# Patient Record
Sex: MALE | Race: WHITE | NOT HISPANIC OR LATINO | Employment: FULL TIME | URBAN - NONMETROPOLITAN AREA
[De-identification: names, ages, dates, MRNs, and addresses within clinical notes are randomized per-mention and may not be internally consistent; named-entity substitution may affect disease eponyms.]

---

## 2017-03-25 ENCOUNTER — APPOINTMENT (OUTPATIENT)
Dept: GENERAL RADIOLOGY | Facility: HOSPITAL | Age: 42
End: 2017-03-25

## 2017-03-25 ENCOUNTER — HOSPITAL ENCOUNTER (INPATIENT)
Facility: HOSPITAL | Age: 42
LOS: 2 days | Discharge: HOME | End: 2017-03-27
Attending: FAMILY MEDICINE | Admitting: HOSPITALIST

## 2017-03-25 DIAGNOSIS — F19.10 SUBSTANCE ABUSE (HCC): ICD-10-CM

## 2017-03-25 DIAGNOSIS — M62.82 NON-TRAUMATIC RHABDOMYOLYSIS: Primary | ICD-10-CM

## 2017-03-25 PROBLEM — F15.10 AMPHETAMINE ABUSE (HCC): Status: ACTIVE | Noted: 2017-03-25

## 2017-03-25 LAB
ALBUMIN SERPL-MCNC: 4.7 G/DL (ref 3.4–4.8)
ALBUMIN/GLOB SERPL: 1.5 G/DL (ref 1.1–1.8)
ALP SERPL-CCNC: 80 U/L (ref 38–126)
ALT SERPL W P-5'-P-CCNC: 45 U/L (ref 21–72)
AMPHET+METHAMPHET UR QL: POSITIVE
ANION GAP SERPL CALCULATED.3IONS-SCNC: 19 MMOL/L (ref 5–15)
AST SERPL-CCNC: 93 U/L (ref 17–59)
BARBITURATES UR QL SCN: NEGATIVE
BASOPHILS # BLD AUTO: 0.02 10*3/MM3 (ref 0–0.2)
BASOPHILS NFR BLD AUTO: 0.2 % (ref 0–2)
BENZODIAZ UR QL SCN: NEGATIVE
BILIRUB SERPL-MCNC: 2.1 MG/DL (ref 0.2–1.3)
BUN BLD-MCNC: 25 MG/DL (ref 7–21)
BUN/CREAT SERPL: 20.7 (ref 7–25)
CALCIUM SPEC-SCNC: 9.6 MG/DL (ref 8.4–10.2)
CANNABINOIDS SERPL QL: NEGATIVE
CHLORIDE SERPL-SCNC: 103 MMOL/L (ref 95–110)
CK MB SERPL-CCNC: 12.8 NG/ML (ref 0–5)
CK SERPL-CCNC: 1413 U/L (ref 55–170)
CO2 SERPL-SCNC: 21 MMOL/L (ref 22–31)
COCAINE UR QL: NEGATIVE
CREAT BLD-MCNC: 1.21 MG/DL (ref 0.7–1.3)
DEPRECATED RDW RBC AUTO: 41.5 FL (ref 35.1–43.9)
EOSINOPHIL # BLD AUTO: 0.01 10*3/MM3 (ref 0–0.7)
EOSINOPHIL NFR BLD AUTO: 0.1 % (ref 0–7)
ERYTHROCYTE [DISTWIDTH] IN BLOOD BY AUTOMATED COUNT: 12.5 % (ref 11.5–14.5)
ETHANOL BLD-MCNC: <10 MG/DL (ref 0–10)
ETHANOL UR QL: <0.01 %
GFR SERPL CREATININE-BSD FRML MDRD: 67 ML/MIN/1.73 (ref 60–162)
GLOBULIN UR ELPH-MCNC: 3.2 GM/DL (ref 2.3–3.5)
GLUCOSE BLD-MCNC: 107 MG/DL (ref 60–100)
HCT VFR BLD AUTO: 46.5 % (ref 39–49)
HGB BLD-MCNC: 17 G/DL (ref 13.7–17.3)
HOLD SPECIMEN: NORMAL
IMM GRANULOCYTES # BLD: 0.03 10*3/MM3 (ref 0–0.02)
IMM GRANULOCYTES NFR BLD: 0.3 % (ref 0–0.5)
LYMPHOCYTES # BLD AUTO: 1.02 10*3/MM3 (ref 0.6–4.2)
LYMPHOCYTES NFR BLD AUTO: 9.2 % (ref 10–50)
MAGNESIUM SERPL-MCNC: 2.2 MG/DL (ref 1.6–2.3)
MCH RBC QN AUTO: 33 PG (ref 26.5–34)
MCHC RBC AUTO-ENTMCNC: 36.6 G/DL (ref 31.5–36.3)
MCV RBC AUTO: 90.3 FL (ref 80–98)
METHADONE UR QL SCN: NEGATIVE
MONOCYTES # BLD AUTO: 0.2 10*3/MM3 (ref 0–0.9)
MONOCYTES NFR BLD AUTO: 1.8 % (ref 0–12)
NEUTROPHILS # BLD AUTO: 9.77 10*3/MM3 (ref 2–8.6)
NEUTROPHILS NFR BLD AUTO: 88.4 % (ref 37–80)
NRBC BLD MANUAL-RTO: 0 /100 WBC (ref 0–0)
OPIATES UR QL: NEGATIVE
OXYCODONE UR QL SCN: NEGATIVE
PLATELET # BLD AUTO: 231 10*3/MM3 (ref 150–450)
PMV BLD AUTO: 10.9 FL (ref 8–12)
POTASSIUM BLD-SCNC: 3.5 MMOL/L (ref 3.5–5.1)
PROT SERPL-MCNC: 7.9 G/DL (ref 6.3–8.6)
RBC # BLD AUTO: 5.15 10*6/MM3 (ref 4.37–5.74)
SODIUM BLD-SCNC: 143 MMOL/L (ref 137–145)
TROPONIN I SERPL-MCNC: <0.012 NG/ML
WBC NRBC COR # BLD: 11.05 10*3/MM3 (ref 3.2–9.8)
WHOLE BLOOD HOLD SPECIMEN: NORMAL
WHOLE BLOOD HOLD SPECIMEN: NORMAL

## 2017-03-25 PROCEDURE — 82553 CREATINE MB FRACTION: CPT | Performed by: FAMILY MEDICINE

## 2017-03-25 PROCEDURE — 83735 ASSAY OF MAGNESIUM: CPT | Performed by: FAMILY MEDICINE

## 2017-03-25 PROCEDURE — 80307 DRUG TEST PRSMV CHEM ANLYZR: CPT | Performed by: FAMILY MEDICINE

## 2017-03-25 PROCEDURE — 99284 EMERGENCY DEPT VISIT MOD MDM: CPT

## 2017-03-25 PROCEDURE — 93005 ELECTROCARDIOGRAM TRACING: CPT | Performed by: FAMILY MEDICINE

## 2017-03-25 PROCEDURE — 85025 COMPLETE CBC W/AUTO DIFF WBC: CPT | Performed by: FAMILY MEDICINE

## 2017-03-25 PROCEDURE — 93010 ELECTROCARDIOGRAM REPORT: CPT | Performed by: INTERNAL MEDICINE

## 2017-03-25 PROCEDURE — 25010000002 LORAZEPAM PER 2 MG: Performed by: INTERNAL MEDICINE

## 2017-03-25 PROCEDURE — 84484 ASSAY OF TROPONIN QUANT: CPT | Performed by: FAMILY MEDICINE

## 2017-03-25 PROCEDURE — 25010000002 LORAZEPAM PER 2 MG: Performed by: FAMILY MEDICINE

## 2017-03-25 PROCEDURE — 71010 HC CHEST PA OR AP: CPT

## 2017-03-25 PROCEDURE — 82550 ASSAY OF CK (CPK): CPT | Performed by: FAMILY MEDICINE

## 2017-03-25 PROCEDURE — 80053 COMPREHEN METABOLIC PANEL: CPT | Performed by: FAMILY MEDICINE

## 2017-03-25 RX ORDER — LORAZEPAM 2 MG/ML
1 INJECTION INTRAMUSCULAR ONCE
Status: COMPLETED | OUTPATIENT
Start: 2017-03-25 | End: 2017-03-25

## 2017-03-25 RX ORDER — DIVALPROEX SODIUM 500 MG/1
500 TABLET, DELAYED RELEASE ORAL 2 TIMES DAILY
Refills: 2 | COMMUNITY
Start: 2017-01-31 | End: 2018-04-19

## 2017-03-25 RX ORDER — LISINOPRIL 20 MG/1
20 TABLET ORAL DAILY
Status: DISCONTINUED | OUTPATIENT
Start: 2017-03-25 | End: 2017-03-27 | Stop reason: HOSPADM

## 2017-03-25 RX ORDER — SODIUM CHLORIDE 9 MG/ML
250 INJECTION, SOLUTION INTRAVENOUS CONTINUOUS
Status: DISCONTINUED | OUTPATIENT
Start: 2017-03-25 | End: 2017-03-25

## 2017-03-25 RX ORDER — DIVALPROEX SODIUM 250 MG/1
500 TABLET, DELAYED RELEASE ORAL 2 TIMES DAILY
Status: DISCONTINUED | OUTPATIENT
Start: 2017-03-25 | End: 2017-03-27 | Stop reason: HOSPADM

## 2017-03-25 RX ORDER — TRAMADOL HYDROCHLORIDE 50 MG/1
50 TABLET ORAL 2 TIMES DAILY
COMMUNITY
End: 2018-02-20 | Stop reason: SDUPTHER

## 2017-03-25 RX ORDER — SODIUM CHLORIDE 0.9 % (FLUSH) 0.9 %
1-10 SYRINGE (ML) INJECTION AS NEEDED
Status: DISCONTINUED | OUTPATIENT
Start: 2017-03-25 | End: 2017-03-27 | Stop reason: HOSPADM

## 2017-03-25 RX ORDER — CYANOCOBALAMIN 1000 UG/ML
1000 INJECTION, SOLUTION INTRAMUSCULAR; SUBCUTANEOUS WEEKLY
Refills: 0 | COMMUNITY
Start: 2017-01-31 | End: 2018-09-04

## 2017-03-25 RX ORDER — SODIUM CHLORIDE 9 MG/ML
125 INJECTION, SOLUTION INTRAVENOUS CONTINUOUS
Status: DISCONTINUED | OUTPATIENT
Start: 2017-03-25 | End: 2017-03-27 | Stop reason: HOSPADM

## 2017-03-25 RX ORDER — LORAZEPAM 2 MG/ML
2 INJECTION INTRAMUSCULAR EVERY 4 HOURS PRN
Status: DISCONTINUED | OUTPATIENT
Start: 2017-03-25 | End: 2017-03-25

## 2017-03-25 RX ORDER — LORAZEPAM 2 MG/ML
1 INJECTION INTRAMUSCULAR EVERY 4 HOURS PRN
Status: DISCONTINUED | OUTPATIENT
Start: 2017-03-25 | End: 2017-03-25

## 2017-03-25 RX ORDER — LORAZEPAM 2 MG/ML
2 INJECTION INTRAMUSCULAR
Status: DISCONTINUED | OUTPATIENT
Start: 2017-03-25 | End: 2017-03-27 | Stop reason: HOSPADM

## 2017-03-25 RX ORDER — OLANZAPINE 10 MG/1
10 TABLET ORAL NIGHTLY
Status: DISCONTINUED | OUTPATIENT
Start: 2017-03-25 | End: 2017-03-27 | Stop reason: HOSPADM

## 2017-03-25 RX ORDER — GABAPENTIN 300 MG/1
300 CAPSULE ORAL 3 TIMES DAILY
Status: DISCONTINUED | OUTPATIENT
Start: 2017-03-25 | End: 2017-03-27 | Stop reason: HOSPADM

## 2017-03-25 RX ADMIN — LORAZEPAM 1 MG: 2 INJECTION INTRAMUSCULAR at 07:22

## 2017-03-25 RX ADMIN — LORAZEPAM 2 MG: 2 INJECTION, SOLUTION INTRAMUSCULAR; INTRAVENOUS at 15:35

## 2017-03-25 RX ADMIN — GABAPENTIN 300 MG: 300 CAPSULE ORAL at 16:19

## 2017-03-25 RX ADMIN — DIVALPROEX SODIUM 500 MG: 250 TABLET, DELAYED RELEASE ORAL at 17:39

## 2017-03-25 RX ADMIN — LORAZEPAM 2 MG: 2 INJECTION INTRAMUSCULAR; INTRAVENOUS at 20:29

## 2017-03-25 RX ADMIN — LORAZEPAM 2 MG: 2 INJECTION INTRAMUSCULAR; INTRAVENOUS at 22:37

## 2017-03-25 RX ADMIN — LISINOPRIL 20 MG: 20 TABLET ORAL at 16:20

## 2017-03-25 RX ADMIN — LORAZEPAM 2 MG: 2 INJECTION INTRAMUSCULAR; INTRAVENOUS at 17:38

## 2017-03-25 RX ADMIN — SODIUM CHLORIDE 250 ML/HR: 900 INJECTION, SOLUTION INTRAVENOUS at 09:05

## 2017-03-25 RX ADMIN — SODIUM CHLORIDE 1000 ML: 900 INJECTION, SOLUTION INTRAVENOUS at 07:22

## 2017-03-25 RX ADMIN — SODIUM CHLORIDE 125 ML/HR: 900 INJECTION, SOLUTION INTRAVENOUS at 14:29

## 2017-03-25 NOTE — H&P
History and Physical  Ian Reyes MD  Hospitalist      Patient Care Team:  GEORGETTE Dominguez as PCP - General (Family Medicine)    Chief complaint   Chief Complaint   Patient presents with   • Drug Overdose       Subjective     Patient is a 41 y.o. male admitted through the ER after being found by police walking the streets in his underwear and without shoes.     History  Past Medical History:   Diagnosis Date   • Hypertension      History reviewed. No pertinent surgical history.  History reviewed. No pertinent family history.  Social History   Substance Use Topics   • Smoking status: Current Every Day Smoker     Packs/day: 1.00     Types: Cigarettes   • Smokeless tobacco: None   • Alcohol use 3.0 oz/week     5 Cans of beer per week     Prescriptions Prior to Admission   Medication Sig Dispense Refill Last Dose   • cyanocobalamin 1000 MCG/ML injection Inject 1,000 mcg into the shoulder, thigh, or buttocks 1 (One) Time Per Week.  0 Past Week at Unknown time   • divalproex (DEPAKOTE) 500 MG DR tablet Take 500 mg by mouth 2 (Two) Times a Day.  2 3/24/2017 at Unknown time   • gabapentin (NEURONTIN) 300 MG capsule Take 300 mg by mouth 3 (Three) Times a Day.   3/24/2017 at Unknown time   • lisinopril (PRINIVIL,ZESTRIL) 20 MG tablet Take 20 mg by mouth Daily.   3/24/2017 at Unknown time   • traMADol (ULTRAM) 50 MG tablet Take 50 mg by mouth 2 (Two) Times a Day.   3/24/2017 at Unknown time   • promethazine-dextromethorphan (PROMETHAZINE-DM) 6.25-15 MG/5ML syrup Take 5 mL by mouth Every 6 (Six) Hours As Needed for cough. 120 mL 1      Allergies:  Penicillins    Review of Systems  Review of Systems   Constitutional: Positive for fatigue.   Respiratory: Positive for cough.    Cardiovascular: Negative for chest pain and leg swelling.   Gastrointestinal: Negative for constipation, diarrhea and nausea.   Musculoskeletal: Positive for back pain.   Neurological: Positive for tremors and numbness.    Psychiatric/Behavioral: Positive for agitation, behavioral problems, hallucinations and sleep disturbance.   All other systems reviewed and are negative.      Objective     Vital Signs  Temp:  [97.1 °F (36.2 °C)-98.9 °F (37.2 °C)] 97.1 °F (36.2 °C)  Heart Rate:  [108-158] 112  Resp:  [20-24] 22  BP: (130-158)/(63-87) 148/72    Physical Exam:  Physical Exam   Constitutional: He is oriented to person, place, and time. He appears well-developed and well-nourished.   HENT:   Head: Normocephalic and atraumatic.   Eyes: EOM are normal. Pupils are equal, round, and reactive to light.   Cardiovascular: Regular rhythm.  Tachycardia present.    No murmur heard.  Pulmonary/Chest: No respiratory distress. He has no wheezes. He has no rales.   Abdominal: Soft. Bowel sounds are normal.   Neurological: He is alert and oriented to person, place, and time.   Skin: Skin is warm and dry.   Psychiatric: His mood appears anxious. His speech is rapid and/or pressured. He is agitated and hyperactive. He exhibits abnormal recent memory.   Vitals reviewed.      Results Review:   Lab Results (last 24 hours)     Procedure Component Value Units Date/Time    Ethanol [[id number]] Collected:  03/25/17 0636    Specimen:  Blood Updated:  03/25/17 0710     Ethanol <10 mg/dL      Ethanol % <0.010 %     Urine Drug Screen [[id number]]  (Abnormal) Collected:  03/25/17 0644    Specimen:  Urine from Urine, Clean Catch Updated:  03/25/17 0734     Amphetamine Screen, Urine Positive (A)     Barbiturates Screen, Urine Negative     Benzodiazepine Screen, Urine Negative     Cocaine Screen, Urine Negative     Methadone Screen, Urine Negative     Opiate Screen Negative     Oxycodone Screen, Urine Negative     THC, Screen, Urine Negative    Narrative:       Negative Thresholds For Drugs Screened in Urine:     Amphetamines          500 ng/ml  Barbiturates          200 ng/ml  Benzodiazepines       200 ng/ml  Cocaine               150 ng/ml  Methadone             300  ng/mL  Opiates               300 ng/mL  Oxycodone             100 ng/mL  THC                   20 ng/mL    The normal value for all drugs tested is negative. This report includes final unconfirmed screening results.  A positive result by this assay can be, at your request, sent to the Reference Lab for confirmation by gas chromatography. Unconfirmed results must not be used for non-medical purposes, such as employment or legal testing. Clinical consideration should be applied to any drug of abuse test result, particularly when unconfirmed results are used.    Comprehensive Metabolic Panel [[id number]]  (Abnormal) Collected:  03/25/17 0636    Specimen:  Blood Updated:  03/25/17 0743     Glucose 107 (H) mg/dL      BUN 25 (H) mg/dL      Creatinine 1.21 mg/dL      Sodium 143 mmol/L      Potassium 3.5 mmol/L      Chloride 103 mmol/L      CO2 21.0 (L) mmol/L      Calcium 9.6 mg/dL      Total Protein 7.9 g/dL      Albumin 4.70 g/dL      ALT (SGPT) 45 U/L      AST (SGOT) 93 (H) U/L      Alkaline Phosphatase 80 U/L      Total Bilirubin 2.1 (H) mg/dL      eGFR Non African Amer 67 mL/min/1.73      Globulin 3.2 gm/dL      A/G Ratio 1.5 g/dL      BUN/Creatinine Ratio 20.7     Anion Gap 19.0 (H) mmol/L     Magnesium [[id number]]  (Normal) Collected:  03/25/17 0636    Specimen:  Blood Updated:  03/25/17 0743     Magnesium 2.2 mg/dL     CK [[id number]]  (Abnormal) Collected:  03/25/17 0636    Specimen:  Blood Updated:  03/25/17 0743     Creatine Kinase 1413 (H) U/L     CBC Auto Differential [[id number]]  (Abnormal) Collected:  03/25/17 0636    Specimen:  Blood Updated:  03/25/17 0749     WBC 11.05 (H) 10*3/mm3      RBC 5.15 10*6/mm3      Hemoglobin 17.0 g/dL      Hematocrit 46.5 %      MCV 90.3 fL      MCH 33.0 pg      MCHC 36.6 (H) g/dL      RDW 12.5 %      RDW-SD 41.5 fl      MPV 10.9 fL      Platelets 231 10*3/mm3      Neutrophil % 88.4 (H) %      Lymphocyte % 9.2 (L) %      Monocyte % 1.8 %      Eosinophil % 0.1 %      Basophil % 0.2 %       Immature Grans % 0.3 %      Neutrophils, Absolute 9.77 (H) 10*3/mm3      Lymphocytes, Absolute 1.02 10*3/mm3      Monocytes, Absolute 0.20 10*3/mm3      Eosinophils, Absolute 0.01 10*3/mm3      Basophils, Absolute 0.02 10*3/mm3      Immature Grans, Absolute 0.03 (H) 10*3/mm3      nRBC 0.0 /100 WBC     CK-MB [[id number]]  (Abnormal) Collected:  03/25/17 0636    Specimen:  Blood Updated:  03/25/17 0755     CKMB 12.80 (H) ng/mL     Troponin [[id number]]  (Normal) Collected:  03/25/17 0636    Specimen:  Blood Updated:  03/25/17 0755     Troponin I <0.012 ng/mL     CBC & Differential [[id number]] Collected:  03/25/17 0636    Specimen:  Blood Updated:  03/25/17 0815    Narrative:       The following orders were created for panel order CBC & Differential.  Procedure                               Abnormality         Status                     ---------                               -----------         ------                     Scan Slide[[id number]]                                                                   CBC Auto Differential[[id number]]         Abnormal            Final result                 Please view results for these tests on the individual orders.    Lavender Top [[id number]] Collected:  03/25/17 0636    Specimen:  Blood Updated:  03/25/17 1102     Extra Tube hold for add-on      Auto resulted       Light Blue Top [[id number]] Collected:  03/25/17 0636    Specimen:  Blood Updated:  03/25/17 1102     Extra Tube hold for add-on      Auto resulted       Green Top (Gel) [[id number]] Collected:  03/25/17 0636    Specimen:  Blood Updated:  03/25/17 1102     Extra Tube Hold for add-ons.      Auto resulted.       [place] Draw [[id number]] Collected:  03/25/17 0636    Specimen:  Blood Updated:  03/25/17 1412    Narrative:       The following orders were created for panel order [place] Draw.  Procedure                               Abnormality         Status                     ---------                               -----------          ------                     Light Blue Top[[id number]]                                    Final result               Green Top (Gel)[[id number]]                                   Final result               Lavender Top[[id number]]                                      Final result               Gold Top - SST[[id number]]                                                                 Please view results for these tests on the individual orders.          Imaging Results (last 24 hours)     Procedure Component Value Units Date/Time    XR Chest 1 View [[id number]] Collected:  03/25/17 0805     Updated:  03/25/17 0807    Narrative:       [facility] [place]    Patient Name: MR. JESSICA LAUREN    ORDERING: JESSICA JOHNSON     ATTENDING: JESSICA JOHNSON     REFERRING: JESSICA JOHNSON    -----------------------    PROCEDURE: Portable chest x-ray    TECHNIQUE: Single AP view of the chest    COMPARISON: None    HISTORY: SOA    FINDINGS:     Life-support devices: None    Lungs/pleura: Clear    Heart, hilar and mediastinal structures:  Normal accounting for  projection and technique      Impression:       CONCLUSION:  No acute pulmonary disease.    Electronically signed by:  Kayden Cotto MD  3/25/2017 8:06 AM CDT  Workstation: [id number]          Assessment/Plan     Principal Problem:    Amphetamine abuse / overdose  Active Problems:    Non-traumatic rhabdomyolysis    Continue with the IV fluid, IV Ativan and Zyprexa PRN for the agitation.    Ian Reyes MD  03/25/17  2:13 PM

## 2017-03-25 NOTE — ED PROVIDER NOTES
Subjective   HPI Comments: Recent h/o synthetic marijuana and methamphetamine use. Pt found walking down the street in his underwear by police.      Review of Systems   Constitutional: Positive for activity change, appetite change, chills, diaphoresis and fatigue. Negative for fever.   HENT: Negative for congestion, ear discharge, ear pain, nosebleeds, rhinorrhea, sinus pressure, sore throat and trouble swallowing.    Eyes: Negative for discharge and redness.   Respiratory: Positive for chest tightness. Negative for apnea, cough, shortness of breath and wheezing.    Cardiovascular: Positive for palpitations. Negative for chest pain.   Gastrointestinal: Negative for abdominal pain, diarrhea, nausea and vomiting.   Endocrine: Negative for polyuria.   Genitourinary: Negative for dysuria, frequency and urgency.   Musculoskeletal: Negative for myalgias and neck pain.   Skin: Negative for color change and rash.   Allergic/Immunologic: Negative for immunocompromised state.   Neurological: Negative for dizziness, seizures, syncope, weakness, light-headedness and headaches.   Hematological: Negative for adenopathy. Does not bruise/bleed easily.   Psychiatric/Behavioral: Positive for behavioral problems and confusion. The patient is nervous/anxious and is hyperactive.    All other systems reviewed and are negative.      Past Medical History:   Diagnosis Date   • Hypertension        Allergies   Allergen Reactions   • Penicillins        History reviewed. No pertinent surgical history.    History reviewed. No pertinent family history.    Social History     Social History   • Marital status:      Spouse name: N/A   • Number of children: N/A   • Years of education: N/A     Social History Main Topics   • Smoking status: Current Every Day Smoker     Packs/day: 0.50     Types: Cigarettes   • Smokeless tobacco: None   • Alcohol use None   • Drug use: Yes     Special: Methamphetamines   • Sexual activity: Not Asked     Other  Topics Concern   • None     Social History Narrative   • None           Objective   Physical Exam   Constitutional: He is oriented to person, place, and time. He appears well-developed and well-nourished.   HENT:   Head: Normocephalic and atraumatic.   Nose: Nose normal.   Mouth/Throat: Oropharynx is clear and moist.   Eyes: Conjunctivae and EOM are normal. Pupils are equal, round, and reactive to light. Right eye exhibits no discharge. Left eye exhibits no discharge. No scleral icterus.   Neck: Normal range of motion. Neck supple. No tracheal deviation present.   Cardiovascular: Regular rhythm and normal heart sounds.  Tachycardia present.    No murmur heard.  Pulmonary/Chest: Effort normal and breath sounds normal. No stridor. No respiratory distress. He has no wheezes. He has no rales.   Abdominal: Soft. Bowel sounds are normal. He exhibits no distension and no mass. There is no tenderness. There is no rebound and no guarding.   Musculoskeletal: He exhibits no edema.   Abrasions right and left foot/toes   Neurological: He is alert and oriented to person, place, and time. Coordination normal.   Skin: Skin is warm and dry. No rash noted. No erythema.   Psychiatric: His mood appears anxious. He is agitated, aggressive and hyperactive. Cognition and memory are impaired.   Nursing note and vitals reviewed.      ECG 12 Lead    Date/Time: 3/25/2017 7:31 AM  Performed by: JESSICA JOHNSON  Authorized by: JESSICA JOHNSON   Interpreted by physician  Rhythm: sinus tachycardia  Rate: tachycardic  BPM: 146  QRS axis: normal  ST Segments: ST segments normal                   ED Course  ED Course          Labs Reviewed   URINE DRUG SCREEN - Abnormal; Notable for the following:        Result Value    Amphetamine Screen, Urine Positive (*)     All other components within normal limits    Narrative:     Negative Thresholds For Drugs Screened in Urine:     Amphetamines          500 ng/ml  Barbiturates          200  ng/ml  Benzodiazepines       200 ng/ml  Cocaine               150 ng/ml  Methadone             300 ng/mL  Opiates               300 ng/mL  Oxycodone             100 ng/mL  THC                   20 ng/mL    The normal value for all drugs tested is negative. This report includes final unconfirmed screening results.  A positive result by this assay can be, at your request, sent to the Reference Lab for confirmation by gas chromatography. Unconfirmed results must not be used for non-medical purposes, such as employment or legal testing. Clinical consideration should be applied to any drug of abuse test result, particularly when unconfirmed results are used.   COMPREHENSIVE METABOLIC PANEL - Abnormal; Notable for the following:     Glucose 107 (*)     BUN 25 (*)     CO2 21.0 (*)     AST (SGOT) 93 (*)     Total Bilirubin 2.1 (*)     Anion Gap 19.0 (*)     All other components within normal limits   CK - Abnormal; Notable for the following:     Creatine Kinase 1413 (*)     All other components within normal limits   CK MB - Abnormal; Notable for the following:     CKMB 12.80 (*)     All other components within normal limits   CBC WITH AUTO DIFFERENTIAL - Abnormal; Notable for the following:     WBC 11.05 (*)     MCHC 36.6 (*)     Neutrophil % 88.4 (*)     Lymphocyte % 9.2 (*)     Neutrophils, Absolute 9.77 (*)     Immature Grans, Absolute 0.03 (*)     All other components within normal limits   TROPONIN (IN-HOUSE) - Normal   MAGNESIUM - Normal   ETHANOL   RAINBOW DRAW    Narrative:     The following orders were created for panel order [place] Draw.  Procedure                               Abnormality         Status                     ---------                               -----------         ------                     Light Blue Top[[id number]]                                    In process                 Green Top (Gel)[[id number]]                                   In process                 Lavender Top[[id number]]                                       In process                 Gold Top - [facility][[id number]]                                                                 Please view results for these tests on the individual orders.   CBC AND DIFFERENTIAL    Narrative:     The following orders were created for panel order CBC & Differential.  Procedure                               Abnormality         Status                     ---------                               -----------         ------                     Scan Slide[[id number]]                                                                   CBC Auto Differential[[id number]]         Abnormal            Final result                 Please view results for these tests on the individual orders.   LIGHT BLUE TOP   GREEN TOP   LAVENDER TOP   GOLD TOP - SST       XR Chest 1 View   Final Result   CONCLUSION:   No acute pulmonary disease.      Electronically signed by:  Kayden Cotto MD  3/25/2017 8:06 AM CDT   Workstation: [id number]                    [facility]    Final diagnoses:   Non-traumatic rhabdomyolysis   Substance abuse            Marko Scott MD  03/25/17 0908       Marko Scott MD  03/25/17 0915

## 2017-03-25 NOTE — ED NOTES
Attempted to call report Brenda said that a nurse would have to call back.      Myla Rooney RN  03/25/17 0998

## 2017-03-25 NOTE — PLAN OF CARE
Problem: Patient Care Overview (Adult)  Goal: Plan of Care Review  Outcome: Ongoing (interventions implemented as appropriate)  Goal: Adult Individualization and Mutuality  Outcome: Ongoing (interventions implemented as appropriate)  Goal: Discharge Needs Assessment  Outcome: Ongoing (interventions implemented as appropriate)    Problem: Infection, Risk/Actual (Adult)  Goal: Identify Related Risk Factors and Signs and Symptoms  Outcome: Ongoing (interventions implemented as appropriate)  Goal: Infection Prevention/Resolution  Outcome: Ongoing (interventions implemented as appropriate)    Problem: Fall Risk (Adult)  Goal: Identify Related Risk Factors and Signs and Symptoms  Outcome: Ongoing (interventions implemented as appropriate)  Goal: Absence of Falls  Outcome: Ongoing (interventions implemented as appropriate)    Problem: Fluid Volume Deficit (Adult)  Goal: Identify Related Risk Factors and Signs and Symptoms  Outcome: Ongoing (interventions implemented as appropriate)  Goal: Fluid/Electrolyte Balance  Outcome: Ongoing (interventions implemented as appropriate)  Goal: Comfort/Well Being  Outcome: Ongoing (interventions implemented as appropriate)

## 2017-03-26 LAB
ALBUMIN SERPL-MCNC: 3.6 G/DL (ref 3.4–4.8)
ALBUMIN/GLOB SERPL: 1.4 G/DL (ref 1.1–1.8)
ALP SERPL-CCNC: 68 U/L (ref 38–126)
ALT SERPL W P-5'-P-CCNC: 46 U/L (ref 21–72)
ANION GAP SERPL CALCULATED.3IONS-SCNC: 10 MMOL/L (ref 5–15)
AST SERPL-CCNC: 67 U/L (ref 17–59)
BILIRUB SERPL-MCNC: 1.4 MG/DL (ref 0.2–1.3)
BUN BLD-MCNC: 11 MG/DL (ref 7–21)
BUN/CREAT SERPL: 16.4 (ref 7–25)
CALCIUM SPEC-SCNC: 8.2 MG/DL (ref 8.4–10.2)
CHLORIDE SERPL-SCNC: 106 MMOL/L (ref 95–110)
CK SERPL-CCNC: 784 U/L (ref 55–170)
CO2 SERPL-SCNC: 24 MMOL/L (ref 22–31)
CREAT BLD-MCNC: 0.67 MG/DL (ref 0.7–1.3)
DEPRECATED RDW RBC AUTO: 44.8 FL (ref 35.1–43.9)
ERYTHROCYTE [DISTWIDTH] IN BLOOD BY AUTOMATED COUNT: 13 % (ref 11.5–14.5)
GFR SERPL CREATININE-BSD FRML MDRD: 132 ML/MIN/1.73 (ref 70–162)
GLOBULIN UR ELPH-MCNC: 2.6 GM/DL (ref 2.3–3.5)
GLUCOSE BLD-MCNC: 107 MG/DL (ref 60–100)
HCT VFR BLD AUTO: 46.9 % (ref 39–49)
HGB BLD-MCNC: 16.2 G/DL (ref 13.7–17.3)
MCH RBC QN AUTO: 32.8 PG (ref 26.5–34)
MCHC RBC AUTO-ENTMCNC: 34.5 G/DL (ref 31.5–36.3)
MCV RBC AUTO: 94.9 FL (ref 80–98)
PLATELET # BLD AUTO: 134 10*3/MM3 (ref 150–450)
PMV BLD AUTO: 9.4 FL (ref 8–12)
POTASSIUM BLD-SCNC: 3.5 MMOL/L (ref 3.5–5.1)
PROT SERPL-MCNC: 6.2 G/DL (ref 6.3–8.6)
RBC # BLD AUTO: 4.94 10*6/MM3 (ref 4.37–5.74)
SODIUM BLD-SCNC: 140 MMOL/L (ref 137–145)
WBC NRBC COR # BLD: 6.09 10*3/MM3 (ref 3.2–9.8)

## 2017-03-26 PROCEDURE — 80053 COMPREHEN METABOLIC PANEL: CPT | Performed by: NURSE PRACTITIONER

## 2017-03-26 PROCEDURE — 25010000002 LORAZEPAM PER 2 MG: Performed by: INTERNAL MEDICINE

## 2017-03-26 PROCEDURE — 82550 ASSAY OF CK (CPK): CPT | Performed by: NURSE PRACTITIONER

## 2017-03-26 PROCEDURE — 85027 COMPLETE CBC AUTOMATED: CPT | Performed by: NURSE PRACTITIONER

## 2017-03-26 RX ADMIN — OLANZAPINE 10 MG: 10 TABLET, FILM COATED ORAL at 21:20

## 2017-03-26 RX ADMIN — Medication 10 ML: at 00:44

## 2017-03-26 RX ADMIN — LISINOPRIL 20 MG: 20 TABLET ORAL at 08:41

## 2017-03-26 RX ADMIN — LORAZEPAM 2 MG: 2 INJECTION INTRAMUSCULAR; INTRAVENOUS at 02:58

## 2017-03-26 RX ADMIN — GABAPENTIN 300 MG: 300 CAPSULE ORAL at 08:41

## 2017-03-26 RX ADMIN — LORAZEPAM 2 MG: 2 INJECTION INTRAMUSCULAR; INTRAVENOUS at 06:42

## 2017-03-26 RX ADMIN — SODIUM CHLORIDE 125 ML/HR: 900 INJECTION, SOLUTION INTRAVENOUS at 17:04

## 2017-03-26 RX ADMIN — GABAPENTIN 300 MG: 300 CAPSULE ORAL at 21:20

## 2017-03-26 RX ADMIN — LORAZEPAM 2 MG: 2 INJECTION INTRAMUSCULAR; INTRAVENOUS at 21:20

## 2017-03-26 RX ADMIN — LORAZEPAM 2 MG: 2 INJECTION INTRAMUSCULAR; INTRAVENOUS at 09:09

## 2017-03-26 RX ADMIN — LORAZEPAM 2 MG: 2 INJECTION INTRAMUSCULAR; INTRAVENOUS at 00:43

## 2017-03-26 RX ADMIN — DIVALPROEX SODIUM 500 MG: 250 TABLET, DELAYED RELEASE ORAL at 17:04

## 2017-03-26 RX ADMIN — DIVALPROEX SODIUM 500 MG: 250 TABLET, DELAYED RELEASE ORAL at 08:41

## 2017-03-26 RX ADMIN — GABAPENTIN 300 MG: 300 CAPSULE ORAL at 15:41

## 2017-03-26 RX ADMIN — SODIUM CHLORIDE 125 ML/HR: 900 INJECTION, SOLUTION INTRAVENOUS at 10:43

## 2017-03-26 RX ADMIN — Medication 10 ML: at 21:20

## 2017-03-26 RX ADMIN — SODIUM CHLORIDE 125 ML/HR: 900 INJECTION, SOLUTION INTRAVENOUS at 00:43

## 2017-03-26 NOTE — PROGRESS NOTES
[facility] Medicine Services  INPATIENT PROGRESS NOTE    Length of Stay: 1  Date of Admission: 3/25/2017  Primary Care Physician: GEORGETTE Dominguez    Subjective   Chief Complaint: Amphetamine abuse  HPI:  41 year old male who was found wandering on the road by police wearing only his underwear.  Urine tox was amphetamine positive.  He states he feels better than yesterday.  Nursing reports agitation/tremor/hyperactivity has improved from yesterday.    Review of Systems   Constitutional: Negative for chills and fever.   Respiratory: Negative for cough and shortness of breath.    Cardiovascular: Negative for chest pain and palpitations.   Gastrointestinal: Negative for abdominal pain, diarrhea, nausea and vomiting.   Musculoskeletal: Negative for back pain and myalgias.   Skin: Negative for rash and wound.   Neurological: Positive for tremors. Negative for dizziness, weakness and headaches.      All pertinent negatives and positives are as above. All other systems have been reviewed and are negative unless otherwise stated.     Objective    Temp:  [97 °F (36.1 °C)-98.3 °F (36.8 °C)] 97 °F (36.1 °C)  Heart Rate:  [101-115] 102  Resp:  [18-21] 20  BP: ()/(36-92) 129/73    Physical Exam   Constitutional: He is oriented to person, place, and time. He appears well-developed and well-nourished. No distress.   HENT:   Head: Normocephalic and atraumatic.   Mouth/Throat: Oropharynx is clear and moist.   Neck: Neck supple.   Cardiovascular: Normal rate, regular rhythm and intact distal pulses.  Exam reveals no gallop and no friction rub.    No murmur heard.  Pulmonary/Chest: Effort normal and breath sounds normal. No respiratory distress. He has no wheezes. He has no rales. He exhibits no tenderness.   Abdominal: Soft. Bowel sounds are normal. He exhibits no distension. There is no tenderness.   Musculoskeletal: Normal range of motion. He exhibits no edema or  deformity.   Neurological: He is alert and oriented to person, place, and time. He displays tremor.   Skin: Skin is warm and dry. He is not diaphoretic. No erythema.   Psychiatric: His mood appears anxious. He is agitated and hyperactive.   Vitals reviewed.    Results Review:  I have reviewed the labs, radiology results, and diagnostic studies.    Laboratory Data:     Results from last 7 days  Lab Units 03/25/17  0636   SODIUM mmol/L 143   POTASSIUM mmol/L 3.5   CHLORIDE mmol/L 103   TOTAL CO2 mmol/L 21.0*   BUN mg/dL 25*   CREATININE mg/dL 1.21   GLUCOSE mg/dL 107*   CALCIUM mg/dL 9.6   BILIRUBIN mg/dL 2.1*   ALK PHOS U/L 80   ALT (SGPT) U/L 45   AST (SGOT) U/L 93*   ANION GAP mmol/L 19.0*     Estimated Creatinine Clearance: 87.1 mL/min (by C-G formula based on Cr of 1.21).    Results from last 7 days  Lab Units 03/25/17  0636   MAGNESIUM mg/dL 2.2           Results from last 7 days  Lab Units 03/26/17  0939 03/25/17  0636   WBC 10*3/mm3 6.09 11.05*   HEMOGLOBIN g/dL 16.2 17.0   HEMATOCRIT % 46.9 46.5   PLATELETS 10*3/mm3 134* 231           Culture Data:   No results found for: BLOODCX  No results found for: URINECX  No results found for: RESPCX  No results found for: WOUNDCX  No results found for: STOOLCX  No components found for: BODYFLD    Radiology Data:   Imaging Results (last 24 hours)     ** No results found for the last 24 hours. **          I have reviewed the patient current medications.     Assessment/Plan     Hospital Problem List     * (Principal)Amphetamine abuse    Non-traumatic rhabdomyolysis          Plan:   IV fluid  Ativan  Monitor electrolytes, renal function, CK  HR is better, only mildly tachycardic      Francis Hernandez, GEORGETTE   03/26/17   11:41 AM

## 2017-03-26 NOTE — PLAN OF CARE
Problem: Patient Care Overview (Adult)  Goal: Plan of Care Review  Outcome: Ongoing (interventions implemented as appropriate)    03/26/17 1514   Coping/Psychosocial Response Interventions   Plan Of Care Reviewed With patient   Patient Care Overview   Progress no change   Outcome Evaluation   Outcome Summary/Follow up Plan Pt still shaky and agitated at timew, but esposides are father apart; Pt still disoriented at times       Goal: Adult Individualization and Mutuality  Outcome: Ongoing (interventions implemented as appropriate)  Goal: Discharge Needs Assessment  Outcome: Ongoing (interventions implemented as appropriate)    Problem: Infection, Risk/Actual (Adult)  Goal: Identify Related Risk Factors and Signs and Symptoms  Outcome: Ongoing (interventions implemented as appropriate)  Goal: Infection Prevention/Resolution  Outcome: Ongoing (interventions implemented as appropriate)    Problem: Fall Risk (Adult)  Goal: Identify Related Risk Factors and Signs and Symptoms  Outcome: Ongoing (interventions implemented as appropriate)  Goal: Absence of Falls  Outcome: Ongoing (interventions implemented as appropriate)    Problem: Fluid Volume Deficit (Adult)  Goal: Identify Related Risk Factors and Signs and Symptoms  Outcome: Ongoing (interventions implemented as appropriate)  Goal: Fluid/Electrolyte Balance  Outcome: Ongoing (interventions implemented as appropriate)  Goal: Comfort/Well Being  Outcome: Ongoing (interventions implemented as appropriate)

## 2017-03-26 NOTE — PLAN OF CARE
Problem: Patient Care Overview (Adult)  Goal: Plan of Care Review  Outcome: Ongoing (interventions implemented as appropriate)    03/26/17 0458   Coping/Psychosocial Response Interventions   Plan Of Care Reviewed With patient   Patient Care Overview   Progress no change   Outcome Evaluation   Outcome Summary/Follow up Plan Pt required ativan q 2 hours during shift to control spasms. Pt experienced one or two moments of orientation.         Problem: Infection, Risk/Actual (Adult)  Goal: Identify Related Risk Factors and Signs and Symptoms  Outcome: Ongoing (interventions implemented as appropriate)  Goal: Infection Prevention/Resolution  Outcome: Ongoing (interventions implemented as appropriate)    Problem: Fall Risk (Adult)  Goal: Identify Related Risk Factors and Signs and Symptoms  Outcome: Ongoing (interventions implemented as appropriate)  Goal: Absence of Falls  Outcome: Ongoing (interventions implemented as appropriate)    Problem: Fluid Volume Deficit (Adult)  Goal: Identify Related Risk Factors and Signs and Symptoms  Outcome: Ongoing (interventions implemented as appropriate)  Goal: Fluid/Electrolyte Balance  Outcome: Ongoing (interventions implemented as appropriate)  Goal: Comfort/Well Being  Outcome: Ongoing (interventions implemented as appropriate)

## 2017-03-27 VITALS
OXYGEN SATURATION: 95 % | TEMPERATURE: 98.5 F | HEART RATE: 106 BPM | DIASTOLIC BLOOD PRESSURE: 86 MMHG | SYSTOLIC BLOOD PRESSURE: 139 MMHG | HEIGHT: 67 IN | BODY MASS INDEX: 30.61 KG/M2 | WEIGHT: 195 LBS | RESPIRATION RATE: 20 BRPM

## 2017-03-27 LAB
ANION GAP SERPL CALCULATED.3IONS-SCNC: 9 MMOL/L (ref 5–15)
BUN BLD-MCNC: 6 MG/DL (ref 7–21)
BUN/CREAT SERPL: 8.7 (ref 7–25)
CALCIUM SPEC-SCNC: 8.2 MG/DL (ref 8.4–10.2)
CHLORIDE SERPL-SCNC: 106 MMOL/L (ref 95–110)
CK SERPL-CCNC: 406 U/L (ref 55–170)
CO2 SERPL-SCNC: 26 MMOL/L (ref 22–31)
CREAT BLD-MCNC: 0.69 MG/DL (ref 0.7–1.3)
GFR SERPL CREATININE-BSD FRML MDRD: 128 ML/MIN/1.73 (ref 70–162)
GLUCOSE BLD-MCNC: 72 MG/DL (ref 60–100)
HOLD SPECIMEN: NORMAL
POTASSIUM BLD-SCNC: 3.2 MMOL/L (ref 3.5–5.1)
SODIUM BLD-SCNC: 141 MMOL/L (ref 137–145)

## 2017-03-27 PROCEDURE — 80048 BASIC METABOLIC PNL TOTAL CA: CPT | Performed by: NURSE PRACTITIONER

## 2017-03-27 PROCEDURE — 82550 ASSAY OF CK (CPK): CPT | Performed by: NURSE PRACTITIONER

## 2017-03-27 RX ADMIN — GABAPENTIN 300 MG: 300 CAPSULE ORAL at 12:13

## 2017-03-27 RX ADMIN — LISINOPRIL 20 MG: 20 TABLET ORAL at 12:13

## 2017-03-27 RX ADMIN — DIVALPROEX SODIUM 500 MG: 250 TABLET, DELAYED RELEASE ORAL at 12:13

## 2017-03-27 RX ADMIN — SODIUM CHLORIDE 125 ML/HR: 900 INJECTION, SOLUTION INTRAVENOUS at 04:47

## 2017-03-27 NOTE — PLAN OF CARE
Problem: Patient Care Overview (Adult)  Goal: Plan of Care Review  Outcome: Ongoing (interventions implemented as appropriate)    03/27/17 0454   Coping/Psychosocial Response Interventions   Plan Of Care Reviewed With patient   Patient Care Overview   Progress improving   Outcome Evaluation   Outcome Summary/Follow up Plan Pt slept well, required ativan less frequently, and was lert, but still disoriented when awake.         Problem: Infection, Risk/Actual (Adult)  Goal: Infection Prevention/Resolution  Outcome: Ongoing (interventions implemented as appropriate)    Problem: Fall Risk (Adult)  Goal: Absence of Falls  Outcome: Ongoing (interventions implemented as appropriate)    Problem: Fluid Volume Deficit (Adult)  Goal: Fluid/Electrolyte Balance  Outcome: Ongoing (interventions implemented as appropriate)  Goal: Comfort/Well Being  Outcome: Ongoing (interventions implemented as appropriate)

## 2017-03-27 NOTE — DISCHARGE SUMMARY
"    [facility] Medicine Services  DISCHARGE SUMMARY       Date of Admission: 3/25/2017  Date of Discharge:  3/27/2017  Primary Care Physician: GEORGETTE Dominguez    Presenting Problem/History of Present Illness:  Substance abuse [F19.10]  Non-traumatic rhabdomyolysis [M62.82]       Final Discharge Diagnoses:  Hospital Problem List     * (Principal)Amphetamine abuse    Non-traumatic rhabdomyolysis          Consults:   Consults     No orders found from 2/24/2017 to 3/26/2017.          Pertinent Test Results: CPK was 1413 on admission, trended down to 406 at discharge    Chief Complaint on Day of Discharge: Anxiety    Hospital Course:  The patient is a 41 y.o. male who [facility] with [facility].  He was found to have mild rhabdomyolysis as well.  He was treated supportively and improved.  His CPK trended down.  On day of discharge, he was awake, and alert and oriented x3.  He was discharged home in good condition.        Condition on Discharge:  good    Physical Exam on Discharge:  /89 (BP Location: Right arm, Patient Position: Lying)  Pulse 96  Temp 97 °F (36.1 °C) (Oral)   Resp 20  Ht 67\" (170.2 cm)  Wt 195 lb (88.5 kg)  SpO2 95%  BMI 30.54 kg/m2     Physical Exam   Constitutional: He appears well-developed and well-nourished.   HENT:   Head: Normocephalic and atraumatic.   Eyes: EOM are normal. Pupils are equal, round, and reactive to light.   Neck: Normal range of motion. Neck supple.   Cardiovascular: Normal rate, regular rhythm, normal heart sounds and intact distal pulses.  Exam reveals no gallop and no friction rub.    No murmur heard.  Pulmonary/Chest: Effort normal and breath sounds normal. No respiratory distress. He has no wheezes. He has no rales. He exhibits no tenderness.   Abdominal: Soft. Bowel sounds are normal. He exhibits no distension. There is no tenderness.   Psychiatric: He has a normal mood and " affect. His behavior is normal.   Vitals reviewed.        Discharge Disposition:  Home or Self Care    Discharge Medications:   Marko Walters   Home Medication Instructions BETINA:[id number]    Printed on:03/27/17 4783   Medication Information                      cyanocobalamin 1000 MCG/ML injection  Inject 1,000 mcg into the shoulder, thigh, or buttocks 1 (One) Time Per Week.             divalproex (DEPAKOTE) 500 MG DR tablet  Take 500 mg by mouth 2 (Two) Times a Day.             gabapentin (NEURONTIN) 300 MG capsule  Take 300 mg by mouth 3 (Three) Times a Day.             lisinopril (PRINIVIL,ZESTRIL) 20 MG tablet  Take 20 mg by mouth Daily.             promethazine-dextromethorphan (PROMETHAZINE-DM) 6.25-15 MG/5ML syrup  Take 5 mL by mouth Every 6 (Six) Hours As Needed for cough.             traMADol (ULTRAM) 50 MG tablet  Take 50 mg by mouth 2 (Two) Times a Day.                 Discharge Diet:   Diet Instructions     Diet: Regular; Thin Liquids, No Restrictions       Discharge Diet:  Regular   Fluid Consistency:  Thin Liquids, No Restrictions                 Activity at Discharge:   Activity Instructions     Activity as Tolerated                     Discharge Care Plan/Instructions: Smoking cessation, illicit drug cessation    Follow-up Appointments:   PCP 1 week    Vikram Curtis MD  03/27/17  2:19 PM    Time: 35 min

## 2018-01-11 ENCOUNTER — TELEPHONE (OUTPATIENT)
Dept: GENERAL RADIOLOGY | Facility: HOSPITAL | Age: 43
End: 2018-01-11

## 2018-01-29 ENCOUNTER — TRANSCRIBE ORDERS (OUTPATIENT)
Dept: ORTHOPEDIC SURGERY | Facility: CLINIC | Age: 43
End: 2018-01-29

## 2018-01-29 DIAGNOSIS — M25.512 LEFT SHOULDER PAIN, UNSPECIFIED CHRONICITY: Primary | ICD-10-CM

## 2018-02-14 ENCOUNTER — OFFICE VISIT (OUTPATIENT)
Dept: ORTHOPEDIC SURGERY | Facility: CLINIC | Age: 43
End: 2018-02-14

## 2018-02-14 DIAGNOSIS — M54.12 CERVICAL RADICULITIS: ICD-10-CM

## 2018-02-14 DIAGNOSIS — M25.512 ACUTE PAIN OF LEFT SHOULDER: Primary | ICD-10-CM

## 2018-02-14 PROCEDURE — 99214 OFFICE O/P EST MOD 30 MIN: CPT | Performed by: NURSE PRACTITIONER

## 2018-02-14 RX ORDER — PYRAZINAMIDE 500 MG/1
1-2 TABLET ORAL EVERY 6 HOURS PRN
Qty: 40 TABLET | Refills: 0 | Status: SHIPPED | OUTPATIENT
Start: 2018-02-14 | End: 2018-04-19

## 2018-02-14 NOTE — PROGRESS NOTES
Marko Walters is a 42 y.o. male   Primary provider:  GEORGETTE Dominguez       Chief Complaint   Patient presents with   • Left Shoulder - Consult       HISTORY OF PRESENT ILLNESS: Patient is here for consult- left shoulder pain. Patient was referred to our office by GEORGETTE Moreno. Patient has been seen by [facility] for this issue as well.  Patient states roughly 1.5 months ago he was playing with his son when he fell landing on his left shoulder. Patient states that 3 days ago he was outside with his dog when he fell twice due to trying to get the dog's leash untangled. Patient states the pain has worsened since then. Previous treatment options include: Ibuprofen, Naproxen, Tylenol and Zanaflex. Patient has also tried applying ice/heat to his shoulder. Pain is described as constant and worse upon movement. Patient states the pain and burning sensation radiates down his arm. Swelling is present to the left shoulder. Patient believes in being honest and open with his medical provider and has admitted to taking a friends Dilaudid in hopes for pain relief. Patient states he realizes this is against the law but was willing to try anything. Patient states that the medication did help the pain.  Patient has brought xray from [facility] for review during today's office visit.     History of Present Illness     CONCURRENT MEDICAL HISTORY:    Past Medical History:   Diagnosis Date   • Hypertension        Allergies   Allergen Reactions   • Penicillins Rash         Current Outpatient Prescriptions:   •  acetaminophen-codeine (TYLENOL/CODEINE #3) 300-30 MG per tablet, Take 1-2 tablets by mouth Every 6 (Six) Hours As Needed for Moderate Pain ., Disp: 40 tablet, Rfl: 0  •  cyanocobalamin 1000 MCG/ML injection, Inject 1,000 mcg into the shoulder, thigh, or buttocks 1 (One) Time Per Week., Disp: , Rfl: 0  •  divalproex (DEPAKOTE) 500 MG DR tablet, Take 500 mg by mouth 2 (Two) Times a Day., Disp: ,  Rfl: 2  •  gabapentin (NEURONTIN) 300 MG capsule, Take 300 mg by mouth 3 (Three) Times a Day., Disp: , Rfl:   •  lisinopril (PRINIVIL,ZESTRIL) 20 MG tablet, Take 20 mg by mouth Daily., Disp: , Rfl:   •  promethazine-dextromethorphan (PROMETHAZINE-DM) 6.25-15 MG/5ML syrup, Take 5 mL by mouth Every 6 (Six) Hours As Needed for cough., Disp: 120 mL, Rfl: 1  •  traMADol (ULTRAM) 50 MG tablet, Take 50 mg by mouth 2 (Two) Times a Day., Disp: , Rfl:     No past surgical history on file.    History reviewed. No pertinent family history.     Social History     Social History   • Marital status:      Spouse name: N/A   • Number of children: N/A   • Years of education: N/A     Occupational History   • Not on file.     Social History Main Topics   • Smoking status: Current Every Day Smoker     Packs/day: 1.00     Types: Cigarettes   • Smokeless tobacco: Not on file   • Alcohol use 3.0 oz/week     5 Cans of beer per week   • Drug use: Yes     Special: Methamphetamines, Cocaine      Comment: synthetic marijuana   • Sexual activity: Defer     Other Topics Concern   • Not on file     Social History Narrative        Review of Systems   Constitutional: Positive for activity change.   HENT: Negative.    Eyes: Negative.    Respiratory: Negative.    Cardiovascular: Negative.    Gastrointestinal: Negative.    Endocrine: Negative.    Genitourinary: Negative.    Musculoskeletal: Negative.    Skin: Negative.    Allergic/Immunologic: Negative.    Neurological: Negative.    Hematological: Negative.    Psychiatric/Behavioral: Negative.        PHYSICAL EXAMINATION:       There were no vitals taken for this visit.    Physical Exam   Constitutional: He is oriented to person, place, and time. Vital signs are normal. He appears well-developed and well-nourished. He is cooperative.   HENT:   Head: Normocephalic and atraumatic.   Neck: Trachea normal and phonation normal.   Pulmonary/Chest: Effort normal. No respiratory distress.   Abdominal:  Soft. Normal appearance. He exhibits no distension.   Neurological: He is alert and oriented to person, place, and time. GCS eye subscore is 4. GCS verbal subscore is 5. GCS motor subscore is 6.   Skin: Skin is warm, dry and intact.   Psychiatric: He has a normal mood and affect. His speech is normal and behavior is normal. Judgment and thought content normal. Cognition and memory are normal.   Vitals reviewed.      GAIT:     [x]  Normal  []  Antalgic    Assistive device: [x]  None  []  Walker     []  Crutches  []  Cane     []  Wheelchair  []  Stretcher    Back Exam     Tenderness   The patient is experiencing tenderness in the cervical.    Range of Motion   Extension: abnormal   Flexion: abnormal   Lateral Bend Right: abnormal   Lateral Bend Left: abnormal   Rotation Right: abnormal   Rotation Left: abnormal     Reflexes   Biceps: abnormal    Other   Toe Walk: normal  Heel Walk: normal  Sensation: decreased (left upper ex)  Gait: normal   Erythema: no back redness  Scars: absent      Right Shoulder Exam   Right shoulder exam is normal.      Left Shoulder Exam     Tenderness   The patient is experiencing tenderness in the acromioclavicular joint.    Range of Motion   Active Abduction: abnormal   Forward Flexion: abnormal   External Rotation: abnormal   Internal Rotation 90 degrees: abnormal     Muscle Strength   Abduction: 4/5   Internal Rotation: 4/5   External Rotation: 4/5   Supraspinatus: 4/5     Tests   Cross Arm: positive  Drop Arm: positive  Impingement: positive    Other   Erythema: absent  Scars: absent  Sensation: normal  Pulse: present                 FIRST CARE    Left shoulder    There is no evidence for fracture or other acute osseous abnormality. There is normal alignment of the glenohumeral joint. The AC joint is normal in appearance, without significant degenerative spurring. The acromion is nearly horizontal.     IMPRESSION    Normal left shoulder      ASSESSMENT:    Diagnoses and all orders for  this visit:    Acute pain of left shoulder  -     MRI shoulder left wo contrast; Future  -     MRI Cervical Spine Without Contrast; Future    Cervical radiculitis  -     MRI Cervical Spine Without Contrast; Future    Other orders  -     acetaminophen-codeine (TYLENOL/CODEINE #3) 300-30 MG per tablet; Take 1-2 tablets by mouth Every 6 (Six) Hours As Needed for Moderate Pain .          PLAN  Significant weakness and limited range of motion noted on exam today of the left shoulder.  Patient also complaining of significant radicular symptoms down the left arm as well after 2 separate falls over the last month.  We'll recommend an MRI of the shoulder rule out a rotator cuff tear and of the cervical spine to rule out a disc rupture.  Patient was given a prescription for Tylenol 3 and told to take as directed.  He continues to take the naproxen and has had a recent course of steroids which has not improved his symptoms.  No Follow-up on file.    Edward Cabezas, GEORGETTE

## 2018-02-19 NOTE — TELEPHONE ENCOUNTER
CALLING TO SAY HIS SHOULDER IS IN SO MUCH PAIN, HE HAS NO MOBILITY. WANTING SOMETHING STRONGER FOR HIS PAIN, [phone]

## 2018-02-20 RX ORDER — TRAMADOL HYDROCHLORIDE 50 MG/1
TABLET ORAL
Qty: 40 TABLET | Refills: 0 | OUTPATIENT
Start: 2018-02-20 | End: 2018-04-19 | Stop reason: ALTCHOICE

## 2018-02-23 ENCOUNTER — HOSPITAL ENCOUNTER (OUTPATIENT)
Dept: MRI IMAGING | Facility: HOSPITAL | Age: 43
Discharge: HOME | End: 2018-02-23

## 2018-02-23 ENCOUNTER — HOSPITAL ENCOUNTER (OUTPATIENT)
Dept: MRI IMAGING | Facility: HOSPITAL | Age: 43
Discharge: HOME | End: 2018-02-23
Admitting: NURSE PRACTITIONER

## 2018-02-23 DIAGNOSIS — M25.512 ACUTE PAIN OF LEFT SHOULDER: ICD-10-CM

## 2018-02-23 DIAGNOSIS — M54.12 CERVICAL RADICULITIS: ICD-10-CM

## 2018-02-23 PROCEDURE — 72141 MRI NECK SPINE W/O DYE: CPT

## 2018-02-23 PROCEDURE — 73221 MRI JOINT UPR EXTREM W/O DYE: CPT

## 2018-02-27 ENCOUNTER — OFFICE VISIT (OUTPATIENT)
Dept: ORTHOPEDIC SURGERY | Facility: CLINIC | Age: 43
End: 2018-02-27

## 2018-02-27 VITALS — WEIGHT: 180 LBS | HEIGHT: 67 IN | BODY MASS INDEX: 28.25 KG/M2

## 2018-02-27 DIAGNOSIS — M75.112 INCOMPLETE TEAR OF LEFT ROTATOR CUFF: Primary | ICD-10-CM

## 2018-02-27 DIAGNOSIS — M19.019 ACROMIOCLAVICULAR JOINT ARTHRITIS: ICD-10-CM

## 2018-02-27 DIAGNOSIS — M50.30 DISC DISEASE, DEGENERATIVE, CERVICAL: ICD-10-CM

## 2018-02-27 PROBLEM — M54.12 CERVICAL RADICULITIS: Status: ACTIVE | Noted: 2018-02-27

## 2018-02-27 PROCEDURE — 99214 OFFICE O/P EST MOD 30 MIN: CPT | Performed by: NURSE PRACTITIONER

## 2018-02-27 PROCEDURE — 20610 DRAIN/INJ JOINT/BURSA W/O US: CPT | Performed by: NURSE PRACTITIONER

## 2018-02-27 RX ADMIN — TRIAMCINOLONE ACETONIDE 80 MG: 40 INJECTION, SUSPENSION INTRA-ARTICULAR; INTRAMUSCULAR at 14:32

## 2018-02-27 RX ADMIN — LIDOCAINE HYDROCHLORIDE 2 ML: 20 INJECTION, SOLUTION INFILTRATION; PERINEURAL at 14:32

## 2018-02-27 NOTE — PROGRESS NOTES
"Marko Walters is a 42 y.o. male returns for     Chief Complaint   Patient presents with   • Cervical Spine - Pain, Follow-up   • Left Shoulder - Follow-up, Pain   • Results     mri cspine and left shoulder        HISTORY OF PRESENT ILLNESS:  42-year-old  male in the office today for follow-up of his neck and shoulder pain.  He reports that after spending mild improvement in his discomfort since the last injection.  He's obtain his MRI of the neck and shoulder as directed     CONCURRENT MEDICAL HISTORY:    Past Medical History:   Diagnosis Date   • Hypertension        Allergies   Allergen Reactions   • Penicillins Rash         Current Outpatient Prescriptions:   •  acetaminophen-codeine (TYLENOL/CODEINE #3) 300-30 MG per tablet, Take 1-2 tablets by mouth Every 6 (Six) Hours As Needed for Moderate Pain ., Disp: 40 tablet, Rfl: 0  •  cyanocobalamin 1000 MCG/ML injection, Inject 1,000 mcg into the shoulder, thigh, or buttocks 1 (One) Time Per Week., Disp: , Rfl: 0  •  divalproex (DEPAKOTE) 500 MG DR tablet, Take 500 mg by mouth 2 (Two) Times a Day., Disp: , Rfl: 2  •  gabapentin (NEURONTIN) 300 MG capsule, Take 300 mg by mouth 3 (Three) Times a Day., Disp: , Rfl:   •  lisinopril (PRINIVIL,ZESTRIL) 20 MG tablet, Take 20 mg by mouth Daily., Disp: , Rfl:   •  promethazine-dextromethorphan (PROMETHAZINE-DM) 6.25-15 MG/5ML syrup, Take 5 mL by mouth Every 6 (Six) Hours As Needed for cough., Disp: 120 mL, Rfl: 1  •  traMADol (ULTRAM) 50 MG tablet, 1-2  PO every 8 hours as needed for moderate pain, Disp: 40 tablet, Rfl: 0    No past surgical history on file.    ROS  No fevers or chills.  No chest pain or shortness of air.  No GI or  disturbances.    PHYSICAL EXAMINATION:       Ht 170.2 cm (67\")  Wt 81.6 kg (180 lb)  BMI 28.19 kg/m2    Physical Exam   Constitutional: He is oriented to person, place, and time. Vital signs are normal. He appears well-developed and well-nourished. He is cooperative.   HENT: "   Head: Normocephalic and atraumatic.   Neck: Trachea normal and phonation normal.   Pulmonary/Chest: Effort normal. No respiratory distress.   Abdominal: Soft. Normal appearance. He exhibits no distension.   Neurological: He is alert and oriented to person, place, and time. GCS eye subscore is 4. GCS verbal subscore is 5. GCS motor subscore is 6.   Skin: Skin is warm, dry and intact.   Psychiatric: He has a normal mood and affect. His speech is normal and behavior is normal. Judgment and thought content normal. Cognition and memory are normal.   Vitals reviewed.      GAIT:     [x]  Normal  []  Antalgic    Assistive device: [x]  None  []  Walker     []  Crutches  []  Cane     []  Wheelchair  []  Stretcher    Back Exam     Tenderness   The patient is experiencing tenderness in the cervical.    Range of Motion   Extension: abnormal   Flexion: abnormal   Lateral Bend Right: abnormal   Lateral Bend Left: abnormal   Rotation Right: abnormal   Rotation Left: abnormal     Reflexes   Biceps: abnormal    Other   Toe Walk: normal  Heel Walk: normal  Sensation: decreased (left upper ex)  Gait: normal   Erythema: no back redness  Scars: absent      Right Shoulder Exam   Right shoulder exam is normal.      Left Shoulder Exam     Tenderness   The patient is experiencing tenderness in the acromioclavicular joint.    Range of Motion   Active Abduction: abnormal   Forward Flexion: abnormal   External Rotation: abnormal   Internal Rotation 90 degrees: abnormal     Muscle Strength   Abduction: 4/5   Internal Rotation: 4/5   External Rotation: 4/5   Supraspinatus: 4/5     Tests   Cross Arm: positive  Drop Arm: positive  Impingement: positive    Other   Erythema: absent  Scars: absent  Sensation: normal  Pulse: present               Mri Cervical Spine Without Contrast    Result Date: 2/23/2018  Narrative: Procedure: MR cervical spine without contrast Reason for exam: Cervical radiculopathy FINDINGS: Multisequence multiplanar MR imaging  of the cervical spine was performed without contrast. Cervical spine vertebral body heights and alignment are normal. There is no evidence of fracture or dislocation identified. The cervical spinal cord is normal. C2-C3: Disc space normal. No disc protrusion or extrusion. Spinal cord and nerve roots exit without compromise. C3-C4: Disc space normal. No disc protrusion or extrusion. Spinal cord and nerve roots exit without compromise. C4-C5: Disc space normal. No disc protrusion or extrusion. Spinal cord and nerve roots exit without compromise. C5-C6: Mild posterior central broad-based disc protrusion mildly indenting the anterior thecal sac. Spinal cord and nerve roots exit without compromise. C6-C7: Mild diffuse disc protrusion mildly indenting the anterior thecal sac. Spinal cord and nerve roots exit without compromise. C7-T1: Disc space normal. No disc protrusion or extrusion. Spinal cord and nerve roots exit without compromise.     Impression: 1.  No acute MR cervical spine abnormality. 2.  C5-C6: Mild posterior central broad-based disc protrusion mildly indenting the anterior thecal sac. Spinal cord and nerve roots exit without compromise. 3.  C6-C7: Mild diffuse disc protrusion mildly indenting the anterior thecal sac. Spinal cord and nerve roots exit without compromise. Electronically signed by:  Jameel Navarro MD  2/23/2018 12:53 PM CST Workstation: [id number]    Mri Shoulder Left Wo Contrast    Result Date: 2/23/2018  Narrative: MRI left shoulder. HISTORY: Left shoulder pain. TECHNIQUE: Multiplanar multisequence noncontrast images left shoulder. Extensive acromioclavicular joint arthrosis. Increased signal intensity, fluid within the AC joint indicating active arthritic, inflammatory process. Acromioclavicular joint arthrosis causing significant underlying impingement upon the bursal surface of the supraspinatous tendon. This is most apparent series 6 image 11. Full-thickness and high-grade partial-thickness tear  involving most of the supraspinatous tendon. The size of the gap in lateral medial projection 2 cm with muscular tendinous retraction of the more anterior aspect of the supraspinatous tendon. More posteriorly there is some signal heterogeneity of the infraspinatus tendon suggesting tendinosis. There is extensive signal abnormality, either contusion or atrophy of the supraspinatous muscle. Series 6 image 16. Small glenohumeral joint effusion. Significant amount of fluid in the subacromial subdeltoid bursa, bursitis. Fluid dissecting into the biceps tendon sheath, tenosynovitis. Thickening and heterogeneity of the subscapularis tendon probably tendinosis. Normal biceps tendon. Normal glenoid kristian.     Impression: CONCLUSION: Extensive acromioclavicular joint arthrosis. Fluid within the AC joint indicating active inflammatory, arthritic process. Acromioclavicular joint arthrosis causing significant underlying impingement upon the bursal surface of the supraspinatous tendon. Full-thickness and high-grade partial-thickness tear involving most of the supraspinatous tendon. There is some muscular tendinous retraction of the more anterior aspect of the supraspinatous tendon and there is signal abnormality within the supraspinatous muscle indicating edema, contusion and/or atrophy. Fluid within the subacromial, subdeltoid bursa, bursitis. Fluid dissecting into the biceps tendon sheath, tenosynovitis. Small glenohumeral joint effusion. Electronically signed by:  Ajay Lowery MD  2/23/2018 7:21 PM CST Workstation: [id number]            ASSESSMENT:    Diagnoses and all orders for this visit:    Incomplete tear of left rotator cuff  -     Large Joint Arthrocentesis  -     Ambulatory Referral to Physical Therapy Evaluate and treat    Disc disease, degenerative, cervical  -     Ambulatory Referral to Physical Therapy Evaluate and treat    Acromioclavicular joint arthritis  -     Ambulatory Referral to Physical Therapy Evaluate and  treat        Large Joint Arthrocentesis  Date/Time: 2/27/2018 2:32 PM  Consent given by: patient  Site marked: site marked  Timeout: Immediately prior to procedure a time out was called to verify the correct patient, procedure, equipment, support staff and site/side marked as required   Supporting Documentation  Indications: pain   Procedure Details  Location: shoulder - L subacromial bursa  Preparation: Patient was prepped and draped in the usual sterile fashion  Needle size: 22 G  Approach: posterior  Medications administered: 80 mg triamcinolone acetonide 40 MG/ML; 2 mL lidocaine 2%  Patient tolerance: patient tolerated the procedure well with no immediate complications          PLAN  Reviewed the MRI results with the patient and discussed options in detail.  We'll plan a subacromial injection in the left shoulder today continued home exercises PT and follow-up with Dr. Guerrero in 4-6 weeks for recheck.  No Follow-up on file.    Edward Cabezas, APRN

## 2018-02-28 RX ORDER — TRIAMCINOLONE ACETONIDE 40 MG/ML
80 INJECTION, SUSPENSION INTRA-ARTICULAR; INTRAMUSCULAR
Status: COMPLETED | OUTPATIENT
Start: 2018-02-27 | End: 2018-02-27

## 2018-02-28 RX ORDER — LIDOCAINE HYDROCHLORIDE 20 MG/ML
2 INJECTION, SOLUTION INFILTRATION; PERINEURAL
Status: COMPLETED | OUTPATIENT
Start: 2018-02-27 | End: 2018-02-27

## 2018-04-19 ENCOUNTER — OFFICE VISIT (OUTPATIENT)
Dept: FAMILY MEDICINE CLINIC | Facility: CLINIC | Age: 43
End: 2018-04-19

## 2018-04-19 VITALS
TEMPERATURE: 96.8 F | DIASTOLIC BLOOD PRESSURE: 82 MMHG | BODY MASS INDEX: 25.05 KG/M2 | HEART RATE: 102 BPM | HEIGHT: 70 IN | WEIGHT: 175 LBS | OXYGEN SATURATION: 97 % | SYSTOLIC BLOOD PRESSURE: 122 MMHG

## 2018-04-19 DIAGNOSIS — R53.83 FATIGUE, UNSPECIFIED TYPE: Primary | ICD-10-CM

## 2018-04-19 DIAGNOSIS — F15.10 AMPHETAMINE ABUSE (HCC): ICD-10-CM

## 2018-04-19 DIAGNOSIS — R20.0 NUMBNESS OF RIGHT HAND: ICD-10-CM

## 2018-04-19 DIAGNOSIS — M54.50 ACUTE BILATERAL LOW BACK PAIN WITHOUT SCIATICA: ICD-10-CM

## 2018-04-19 LAB
BILIRUB BLD-MCNC: NEGATIVE MG/DL
CLARITY, POC: CLEAR
COLOR UR: YELLOW
GLUCOSE UR STRIP-MCNC: NEGATIVE MG/DL
KETONES UR QL: NEGATIVE
LEUKOCYTE EST, POC: NEGATIVE
NITRITE UR-MCNC: NEGATIVE MG/ML
PH UR: 6 [PH] (ref 5–8)
PROT UR STRIP-MCNC: NEGATIVE MG/DL
RBC # UR STRIP: NEGATIVE /UL
SP GR UR: 1.03 (ref 1–1.03)
UROBILINOGEN UR QL: NORMAL

## 2018-04-19 PROCEDURE — 99214 OFFICE O/P EST MOD 30 MIN: CPT | Performed by: NURSE PRACTITIONER

## 2018-04-19 RX ORDER — KETOROLAC TROMETHAMINE 10 MG/1
TABLET, FILM COATED ORAL
Refills: 0 | COMMUNITY
Start: 2018-01-11 | End: 2018-04-19 | Stop reason: ALTCHOICE

## 2018-04-19 RX ORDER — CIPROFLOXACIN 500 MG/1
TABLET, FILM COATED ORAL
Refills: 0 | COMMUNITY
Start: 2018-01-11 | End: 2018-04-19

## 2018-04-19 RX ORDER — BUPRENORPHINE HYDROCHLORIDE AND NALOXONE HYDROCHLORIDE DIHYDRATE 8; 2 MG/1; MG/1
TABLET SUBLINGUAL
Refills: 0 | COMMUNITY
Start: 2018-04-17 | End: 2018-09-04

## 2018-04-19 RX ORDER — AZITHROMYCIN 500 MG/1
TABLET, FILM COATED ORAL
Refills: 0 | COMMUNITY
Start: 2018-04-05 | End: 2018-04-19

## 2018-04-19 RX ORDER — METHYLPREDNISOLONE 4 MG/1
TABLET ORAL
Qty: 1 EACH | Refills: 0 | Status: SHIPPED | OUTPATIENT
Start: 2018-04-19 | End: 2018-09-04

## 2018-04-19 NOTE — PATIENT INSTRUCTIONS
Back Pain, Adult  Back pain is very common in adults. The cause of back pain is rarely dangerous and the pain often gets better over time. The cause of your back pain may not be known. Some common causes of back pain include:  · Strain of the muscles or ligaments supporting the spine.  · Wear and tear (degeneration) of the spinal disks.  · Arthritis.  · Direct injury to the back.  For many people, back pain may return. Since back pain is rarely dangerous, most people can learn to manage this condition on their own.  Follow these instructions at home:  Watch your back pain for any changes. The following actions may help to lessen any discomfort you are feeling:  · Remain active. It is stressful on your back to sit or  one place for long periods of time. Do not sit, drive, or  one place for more than 30 minutes at a time. Take short walks on even surfaces as soon as you are able. Try to increase the length of time you walk each day.  · Exercise regularly as directed by your health care provider. Exercise helps your back heal faster. It also helps avoid future injury by keeping your muscles strong and flexible.  · Do not stay in bed. Resting more than 1-2 days can delay your recovery.  · Pay attention to your body when you bend and lift. The most comfortable positions are those that put less stress on your recovering back. Always use proper lifting techniques, including:  ¨ Bending your knees.  ¨ Keeping the load close to your body.  ¨ Avoiding twisting.  · Find a comfortable position to sleep. Use a firm mattress and lie on your side with your knees slightly bent. If you lie on your back, put a pillow under your knees.  · Avoid feeling anxious or stressed. Stress increases muscle tension and can worsen back pain. It is important to recognize when you are anxious or stressed and learn ways to manage it, such as with exercise.  · Take medicines only as directed by your health care provider.  Over-the-counter medicines to reduce pain and inflammation are often the most helpful. Your health care provider may prescribe muscle relaxant drugs. These medicines help dull your pain so you can more quickly return to your normal activities and healthy exercise.  · Apply ice to the injured area:  ¨ Put ice in a plastic bag.  ¨ Place a towel between your skin and the bag.  ¨ Leave the ice on for 20 minutes, 2-3 times a day for the first 2-3 days. After that, ice and heat may be alternated to reduce pain and spasms.  · Maintain a healthy weight. Excess weight puts extra stress on your back and makes it difficult to maintain good posture.  Contact a health care provider if:  · You have pain that is not relieved with rest or medicine.  · You have increasing pain going down into the legs or buttocks.  · You have pain that does not improve in one week.  · You have night pain.  · You lose weight.  · You have a fever or chills.  Get help right away if:  · You develop new bowel or bladder control problems.  · You have unusual weakness or numbness in your arms or legs.  · You develop nausea or vomiting.  · You develop abdominal pain.  · You feel faint.  This information is not intended to replace advice given to you by your health care provider. Make sure you discuss any questions you have with your health care provider.  Document Released: 12/18/2006 Document Revised: 04/27/2017 Document Reviewed: 04/21/2015  [employer] Interactive Patient Education © 2017 [employer]  Fatigue  Fatigue is feeling tired all of the time, a lack of energy, or a lack of motivation. Occasional or mild fatigue is often a normal response to activity or life in general. However, long-lasting (chronic) or extreme fatigue may indicate an underlying medical condition.  Follow these instructions at home:  Watch your fatigue for any changes. The following actions may help to lessen any discomfort you are feeling:  · Talk to your health care provider  about how much sleep you need each night. Try to get the required amount every night.  · Take medicines only as directed by your health care provider.  · Eat a healthy and nutritious diet. Ask your health care provider if you need help changing your diet.  · Drink enough fluid to keep your urine clear or pale yellow.  · Practice ways of relaxing, such as yoga, meditation, massage therapy, or acupuncture.  · Exercise regularly.  · Change situations that cause you stress. Try to keep your work and personal routine reasonable.  · Do not abuse illegal drugs.  · Limit alcohol intake to no more than 1 drink per day for nonpregnant women and 2 drinks per day for men. One drink equals 12 ounces of beer, 5 ounces of wine, or 1½ ounces of hard liquor.  · Take a multivitamin, if directed by your health care provider.  Contact a health care provider if:  · Your fatigue does not get better.  · You have a fever.  · You have unintentional weight loss or gain.  · You have headaches.  · You have difficulty:  ¨ Falling asleep.  ¨ Sleeping throughout the night.  · You feel angry, guilty, anxious, or sad.  · You are unable to have a bowel movement (constipation).  · You skin is dry.  · Your legs or another part of your body is swollen.  Get help right away if:  · You feel confused.  · Your vision is blurry.  · You feel faint or pass out.  · You have a severe headache.  · You have severe abdominal, pelvic, or back pain.  · You have chest pain, shortness of breath, or an irregular or fast heartbeat.  · You are unable to urinate or you urinate less than normal.  · You develop abnormal bleeding, such as bleeding from the rectum, vagina, nose, lungs, or nipples.  · You vomit blood.  · You have thoughts about harming yourself or committing suicide.  · You are worried that you might harm someone else.  This information is not intended to replace advice given to you by your health care provider. Make sure you discuss any questions you have  with your health care provider.  Document Released: 10/14/2008 Document Revised: 05/25/2017 Document Reviewed: 04/21/2015  [employer] Interactive Patient Education © 2017 [employer]

## 2018-05-22 ENCOUNTER — APPOINTMENT (OUTPATIENT)
Dept: CT IMAGING | Facility: HOSPITAL | Age: 43
End: 2018-05-22

## 2018-05-22 ENCOUNTER — HOSPITAL ENCOUNTER (EMERGENCY)
Facility: HOSPITAL | Age: 43
Discharge: HOME | End: 2018-05-22
Attending: FAMILY MEDICINE | Admitting: FAMILY MEDICINE

## 2018-05-22 VITALS
HEIGHT: 70 IN | TEMPERATURE: 98 F | SYSTOLIC BLOOD PRESSURE: 142 MMHG | OXYGEN SATURATION: 97 % | BODY MASS INDEX: 25.05 KG/M2 | HEART RATE: 87 BPM | WEIGHT: 175 LBS | DIASTOLIC BLOOD PRESSURE: 70 MMHG | RESPIRATION RATE: 20 BRPM

## 2018-05-22 DIAGNOSIS — F19.10 SUBSTANCE ABUSE (HCC): Primary | ICD-10-CM

## 2018-05-22 DIAGNOSIS — F11.93 WITHDRAWAL FROM OPIOIDS (HCC): ICD-10-CM

## 2018-05-22 LAB
ALBUMIN SERPL-MCNC: 4.4 G/DL (ref 3.4–4.8)
ALBUMIN/GLOB SERPL: 1.3 G/DL (ref 1.1–1.8)
ALP SERPL-CCNC: 93 U/L (ref 38–126)
ALT SERPL W P-5'-P-CCNC: 42 U/L (ref 21–72)
AMPHET+METHAMPHET UR QL: POSITIVE
ANION GAP SERPL CALCULATED.3IONS-SCNC: 12 MMOL/L (ref 5–15)
APAP SERPL-MCNC: <10 MCG/ML (ref 10–30)
AST SERPL-CCNC: 37 U/L (ref 17–59)
BACTERIA UR QL AUTO: ABNORMAL /HPF
BARBITURATES UR QL SCN: NEGATIVE
BASOPHILS # BLD AUTO: 0.01 10*3/MM3 (ref 0–0.2)
BASOPHILS NFR BLD AUTO: 0.2 % (ref 0–2)
BENZODIAZ UR QL SCN: NEGATIVE
BILIRUB SERPL-MCNC: 0.8 MG/DL (ref 0.2–1.3)
BILIRUB UR QL STRIP: NEGATIVE
BUN BLD-MCNC: 10 MG/DL (ref 7–21)
BUN/CREAT SERPL: 16.1 (ref 7–25)
CALCIUM SPEC-SCNC: 9.1 MG/DL (ref 8.4–10.2)
CANNABINOIDS SERPL QL: POSITIVE
CHLORIDE SERPL-SCNC: 103 MMOL/L (ref 95–110)
CLARITY UR: ABNORMAL
CO2 SERPL-SCNC: 26 MMOL/L (ref 22–31)
COCAINE UR QL: NEGATIVE
COLOR UR: YELLOW
CREAT BLD-MCNC: 0.62 MG/DL (ref 0.7–1.3)
DEPRECATED RDW RBC AUTO: 41.2 FL (ref 35.1–43.9)
EOSINOPHIL # BLD AUTO: 0.04 10*3/MM3 (ref 0–0.7)
EOSINOPHIL NFR BLD AUTO: 0.7 % (ref 0–7)
ERYTHROCYTE [DISTWIDTH] IN BLOOD BY AUTOMATED COUNT: 12.7 % (ref 11.5–14.5)
ETHANOL BLD-MCNC: <10 MG/DL (ref 0–10)
ETHANOL UR QL: <0.01 %
GFR SERPL CREATININE-BSD FRML MDRD: 143 ML/MIN/1.73 (ref 63–147)
GLOBULIN UR ELPH-MCNC: 3.3 GM/DL (ref 2.3–3.5)
GLUCOSE BLD-MCNC: 105 MG/DL (ref 60–100)
GLUCOSE UR STRIP-MCNC: NEGATIVE MG/DL
HCT VFR BLD AUTO: 43.5 % (ref 39–49)
HGB BLD-MCNC: 15.8 G/DL (ref 13.7–17.3)
HGB UR QL STRIP.AUTO: NEGATIVE
HOLD SPECIMEN: NORMAL
HYALINE CASTS UR QL AUTO: ABNORMAL /LPF
IMM GRANULOCYTES # BLD: 0.01 10*3/MM3 (ref 0–0.02)
IMM GRANULOCYTES NFR BLD: 0.2 % (ref 0–0.5)
KETONES UR QL STRIP: NEGATIVE
LEUKOCYTE ESTERASE UR QL STRIP.AUTO: ABNORMAL
LYMPHOCYTES # BLD AUTO: 1.21 10*3/MM3 (ref 0.6–4.2)
LYMPHOCYTES NFR BLD AUTO: 20.9 % (ref 10–50)
MAGNESIUM SERPL-MCNC: 2 MG/DL (ref 1.6–2.3)
MCH RBC QN AUTO: 32.3 PG (ref 26.5–34)
MCHC RBC AUTO-ENTMCNC: 36.3 G/DL (ref 31.5–36.3)
MCV RBC AUTO: 89 FL (ref 80–98)
METHADONE UR QL SCN: NEGATIVE
MONOCYTES # BLD AUTO: 0.44 10*3/MM3 (ref 0–0.9)
MONOCYTES NFR BLD AUTO: 7.6 % (ref 0–12)
NEUTROPHILS # BLD AUTO: 4.08 10*3/MM3 (ref 2–8.6)
NEUTROPHILS NFR BLD AUTO: 70.4 % (ref 37–80)
NITRITE UR QL STRIP: NEGATIVE
OPIATES UR QL: NEGATIVE
OXYCODONE UR QL SCN: NEGATIVE
PH UR STRIP.AUTO: 7.5 [PH] (ref 5–9)
PLATELET # BLD AUTO: 282 10*3/MM3 (ref 150–450)
PMV BLD AUTO: 8.8 FL (ref 8–12)
POTASSIUM BLD-SCNC: 3.5 MMOL/L (ref 3.5–5.1)
PROT SERPL-MCNC: 7.7 G/DL (ref 6.3–8.6)
PROT UR QL STRIP: ABNORMAL
RBC # BLD AUTO: 4.89 10*6/MM3 (ref 4.37–5.74)
RBC # UR: ABNORMAL /HPF
REF LAB TEST METHOD: ABNORMAL
SALICYLATES SERPL-MCNC: <1 MG/DL (ref 10–20)
SODIUM BLD-SCNC: 141 MMOL/L (ref 137–145)
SP GR UR STRIP: 1.02 (ref 1–1.03)
SQUAMOUS #/AREA URNS HPF: ABNORMAL /HPF
UROBILINOGEN UR QL STRIP: ABNORMAL
WBC NRBC COR # BLD: 5.79 10*3/MM3 (ref 3.2–9.8)
WBC UR QL AUTO: ABNORMAL /HPF
WHOLE BLOOD HOLD SPECIMEN: NORMAL
WHOLE BLOOD HOLD SPECIMEN: NORMAL

## 2018-05-22 PROCEDURE — 80053 COMPREHEN METABOLIC PANEL: CPT | Performed by: PHYSICIAN ASSISTANT

## 2018-05-22 PROCEDURE — 80307 DRUG TEST PRSMV CHEM ANLYZR: CPT | Performed by: PHYSICIAN ASSISTANT

## 2018-05-22 PROCEDURE — 99284 EMERGENCY DEPT VISIT MOD MDM: CPT

## 2018-05-22 PROCEDURE — 96374 THER/PROPH/DIAG INJ IV PUSH: CPT

## 2018-05-22 PROCEDURE — 83735 ASSAY OF MAGNESIUM: CPT | Performed by: PHYSICIAN ASSISTANT

## 2018-05-22 PROCEDURE — 96361 HYDRATE IV INFUSION ADD-ON: CPT

## 2018-05-22 PROCEDURE — 85025 COMPLETE CBC W/AUTO DIFF WBC: CPT | Performed by: PHYSICIAN ASSISTANT

## 2018-05-22 PROCEDURE — 96376 TX/PRO/DX INJ SAME DRUG ADON: CPT

## 2018-05-22 PROCEDURE — 70450 CT HEAD/BRAIN W/O DYE: CPT

## 2018-05-22 PROCEDURE — 81001 URINALYSIS AUTO W/SCOPE: CPT | Performed by: PHYSICIAN ASSISTANT

## 2018-05-22 PROCEDURE — 25010000002 LORAZEPAM PER 2 MG: Performed by: FAMILY MEDICINE

## 2018-05-22 RX ORDER — SODIUM CHLORIDE 0.9 % (FLUSH) 0.9 %
10 SYRINGE (ML) INJECTION AS NEEDED
Status: DISCONTINUED | OUTPATIENT
Start: 2018-05-22 | End: 2018-05-22 | Stop reason: HOSPADM

## 2018-05-22 RX ORDER — LORAZEPAM 2 MG/ML
1 INJECTION INTRAMUSCULAR ONCE
Status: COMPLETED | OUTPATIENT
Start: 2018-05-22 | End: 2018-05-22

## 2018-05-22 RX ADMIN — LORAZEPAM 1 MG: 2 INJECTION, SOLUTION INTRAMUSCULAR; INTRAVENOUS at 10:56

## 2018-05-22 RX ADMIN — SODIUM CHLORIDE 1000 ML: 9 INJECTION, SOLUTION INTRAVENOUS at 10:55

## 2018-05-22 RX ADMIN — LORAZEPAM 1 MG: 2 INJECTION, SOLUTION INTRAMUSCULAR; INTRAVENOUS at 12:19

## 2018-05-22 NOTE — ED PROVIDER NOTES
"Subjective   Patient's history is limited due to cooperation. Son states that he picked him up this morning and he \"looked sick\" so he brought him here. Patient is shaking all over. States that he has a headache. When asked if he took something he responds with shaking his head no and saying \"they gave it to me.\" He has a history of substance abuse. He has smoke synthetics before. Patient states that he thinks he is withdrawing from suboxone. He was being seen at a suboxone clinic but it closed down so he has been out of them for two days.         History provided by:  Patient   used: No    Headache   Pain location:  Generalized  Quality:  Dull  Radiates to:  Does not radiate  Progression:  Unchanged  Chronicity:  Recurrent  Similar to prior headaches: yes    Context: not activity, not exposure to bright light, not caffeine, not coughing, not defecating, not eating, not stress, not exposure to cold air, not intercourse, not loud noise and not straining    Relieved by:  Nothing  Worsened by:  Nothing  Ineffective treatments:  None tried  Associated symptoms: nausea    Associated symptoms: no abdominal pain, no back pain, no blurred vision, no congestion, no cough, no diarrhea, no dizziness, no drainage, no ear pain, no eye pain, no facial pain, no fatigue, no fever, no focal weakness, no hearing loss, no loss of balance, no myalgias, no near-syncope, no neck pain, no neck stiffness, no numbness, no paresthesias, no photophobia, no seizures, no sinus pressure, no sore throat, no swollen glands, no syncope, no tingling, no URI, no visual change, no vomiting and no weakness    Risk factors: no anger, no family hx of SAH and does not have insomnia        Review of Systems   Constitutional: Negative.  Negative for fatigue and fever.   HENT: Negative for congestion, dental problem, drooling, ear discharge, ear pain, facial swelling, hearing loss, mouth sores, nosebleeds, postnasal drip, rhinorrhea, " sinus pain, sinus pressure, sneezing, sore throat, tinnitus, trouble swallowing and voice change.    Eyes: Negative.  Negative for blurred vision, photophobia and pain.   Respiratory: Negative.  Negative for cough.    Cardiovascular: Negative.  Negative for syncope and near-syncope.   Gastrointestinal: Positive for nausea. Negative for abdominal distention, abdominal pain, anal bleeding, blood in stool, constipation, diarrhea, rectal pain and vomiting.   Endocrine: Negative.    Genitourinary: Negative.    Musculoskeletal: Negative.  Negative for back pain, myalgias, neck pain and neck stiffness.   Skin: Negative.    Allergic/Immunologic: Negative.    Neurological: Positive for headaches. Negative for dizziness, tremors, focal weakness, seizures, syncope, facial asymmetry, speech difficulty, weakness, light-headedness, numbness, paresthesias and loss of balance.        Shaking all over     Hematological: Negative.    Psychiatric/Behavioral: Negative.        Past Medical History:   Diagnosis Date   • Hypertension        Allergies   Allergen Reactions   • Penicillins Rash       History reviewed. No pertinent surgical history.    History reviewed. No pertinent family history.    Social History     Social History   • Marital status:      Social History Main Topics   • Smoking status: Current Every Day Smoker     Packs/day: 1.00     Types: Cigarettes   • Smokeless tobacco: Current User     Types: Snuff   • Alcohol use No   • Drug use: No   • Sexual activity: Defer     Other Topics Concern   • Not on file           Objective   Physical Exam   Constitutional: He is oriented to person, place, and time. He appears well-developed and well-nourished. No distress.   HENT:   Head: Normocephalic and atraumatic.   Mouth/Throat: No oropharyngeal exudate.   Eyes: Conjunctivae and EOM are normal. Pupils are equal, round, and reactive to light. Right eye exhibits no discharge. Left eye exhibits no discharge. No scleral icterus.    Neck: Normal range of motion. Neck supple. No JVD present. No tracheal deviation present. No thyromegaly present.   Cardiovascular: Normal rate, regular rhythm, normal heart sounds and intact distal pulses.  Exam reveals no gallop and no friction rub.    No murmur heard.  Pulmonary/Chest: Effort normal and breath sounds normal. No stridor. No respiratory distress. He has no wheezes. He has no rales. He exhibits no tenderness.   Abdominal: Soft. Bowel sounds are normal. He exhibits no distension and no mass. There is no tenderness. There is no rebound and no guarding. No hernia.   Musculoskeletal: Normal range of motion. He exhibits no edema, tenderness or deformity.   Lymphadenopathy:     He has no cervical adenopathy.   Neurological: He is alert and oriented to person, place, and time. He has normal reflexes. He displays normal reflexes. No cranial nerve deficit or sensory deficit. He exhibits normal muscle tone. Coordination normal.   Skin: Skin is warm and dry. Capillary refill takes less than 2 seconds. No rash noted. He is not diaphoretic. No erythema. No pallor.   Psychiatric: His behavior is normal. Judgment and thought content normal. His mood appears anxious. His affect is not angry, not blunt, not labile and not inappropriate. His speech is rapid and/or pressured. His speech is not delayed, not tangential and not slurred. He is not actively hallucinating. Cognition and memory are normal. He does not exhibit a depressed mood. He is communicative. He is attentive.   Nursing note and vitals reviewed.      Procedures           ED Course      Labs Reviewed   COMPREHENSIVE METABOLIC PANEL - Abnormal; Notable for the following:        Result Value    Glucose 105 (*)     Creatinine 0.62 (*)     All other components within normal limits   URINALYSIS W/ MICROSCOPIC IF INDICATED - Abnormal; Notable for the following:     Appearance, UA Turbid (*)     Protein, UA Trace (*)     Leuk Esterase, UA Small (1+) (*)      Urobilinogen, UA 2.0 E.U./dL (*)     All other components within normal limits   URINE DRUG SCREEN - Abnormal; Notable for the following:     Amphetamine Screen, Urine Positive (*)     THC, Screen, Urine Positive (*)     All other components within normal limits    Narrative:     Negative Thresholds For Drugs Screened in Urine:     Amphetamines          500 ng/ml  Barbiturates          200 ng/ml  Benzodiazepines       200 ng/ml  Cocaine               150 ng/ml  Methadone             300 ng/mL  Opiates               300 ng/mL  Oxycodone             100 ng/mL  THC                   20 ng/mL    The normal value for all drugs tested is negative. This report includes final unconfirmed screening results.  A positive result by this assay can be, at your request, sent to the Reference Lab for confirmation by gas chromatography. Unconfirmed results must not be used for non-medical purposes, such as employment or legal testing. Clinical consideration should be applied to any drug of abuse test result, particularly when unconfirmed results are used.   ACETAMINOPHEN LEVEL - Abnormal; Notable for the following:     Acetaminophen <10.0 (*)     All other components within normal limits   SALICYLATE LEVEL - Abnormal; Notable for the following:     Salicylate <1.0 (*)     All other components within normal limits   URINALYSIS, MICROSCOPIC ONLY - Abnormal; Notable for the following:     RBC, UA 0-2 (*)     WBC, UA 21-30 (*)     Squamous Epithelial Cells, UA 3-5 (*)     All other components within normal limits   MAGNESIUM - Normal   CBC WITH AUTO DIFFERENTIAL - Normal   ETHANOL   RAINBOW DRAW    Narrative:     The following orders were created for panel order [place] Draw.  Procedure                               Abnormality         Status                     ---------                               -----------         ------                     Light Blue Top[[id number]]                                   Final result                Green Top (Gel)[[id number]]                                  Final result               Lavender Top[[id number]]                                     Final result               Gold Top - SST[[id number]]                                                                Please view results for these tests on the individual orders.   CBC AND DIFFERENTIAL    Narrative:     The following orders were created for panel order CBC & Differential.  Procedure                               Abnormality         Status                     ---------                               -----------         ------                     CBC Auto Differential[[id number]]        Normal              Final result                 Please view results for these tests on the individual orders.   LIGHT BLUE TOP   GREEN TOP   LAVENDER TOP   GOLD TOP - SST     Ct Head Without Contrast    Result Date: 5/22/2018  Narrative: .    EXAMINATION:  Computed Tomography    REGION:  Head         INDICATION:  confusion  , aphasia HISTORY: CORRELATIVE IMAGING:    none  TECHNIQUE:  iv contrast:  no  This exam was performed according to the departmental dose-optimization program which includes automated exposure control, adjustment of the mA and/or kV according to patient size and/or use of iterative reconstruction technique.          COMMENTS:            - atrophy: none   - cortex:                WNL   - deep white mat:  WNL   - hemorrhage:       none     - fluid collection: no intra/extra axial fluid collection   - mass / lesion:     no focal parenchymal lesion(s)     - gray/white jxn:   borders preserved       - brain stem:         wnl     - cerebellum:        wnl     - globes / retro:     wnl     - ventricles:          normal size / configuration     - midline shift:      no     - sinuses:              wnl     - mastoids:           wnl      - osseous:             wnl     - misc.: .       Impression: CONCLUSION:  1.  Negative examination for acute intracranial pathology.        If signs or symptoms persist beyond reasonable expectations, a MRI examination is suggested as is deemed clinically appropriate.    Electronically signed by:  GIO Carr MD  5/22/2018 12:37 PM CDT Workstation: [id number]      Patient's symptoms have resolved after hydration and IV ativan. He is talking normally and denies suicidal thoughts, hallucinations, or thoughts of harming himself or others.   Admits to using synthetic recently. Shaking has resolved. Patient states that he feels better.     Contacted suboxone clinic in [place] and the patient had an appointment with them today but missed it. States that they will still see him today. Son and family member are agreeable to take him straight to his appointment there.    The patient is agreeable to treatment plan and verbalizes understanding. Advised to return to ED if symptoms return or worsen. Patient refuses overnight observation, states that he will go to his appointment at the suboxone clinic and get his medication. Will return to ED if his symptoms return.           MDM      Final diagnoses:   Substance abuse   Withdrawal from opioids            YOLI Dinero  05/22/18 [id number]

## 2018-05-22 NOTE — ED NOTES
Spoke with Cathy from [facility] [[email or web address]  Cathy will be down shortly.      Myla Rooney RN  05/22/18 [id number]

## 2018-05-22 NOTE — ED NOTES
Pt c/o out of suboxone 2 days. [place] [facility] closed.  I called [place] [facility].  Pt had an appt today but did not show up.  Clinic stated they would still see him if he came today or he can make another appt.  Pt/s family stated they would take him and took the information on the clinic and was calling to let them know they would be on the way as soon as discharged from the ED.

## 2018-09-04 ENCOUNTER — HOSPITAL ENCOUNTER (EMERGENCY)
Facility: HOSPITAL | Age: 43
Discharge: HOME | End: 2018-09-04
Attending: EMERGENCY MEDICINE | Admitting: EMERGENCY MEDICINE

## 2018-09-04 ENCOUNTER — APPOINTMENT (OUTPATIENT)
Dept: CT IMAGING | Facility: HOSPITAL | Age: 43
End: 2018-09-04

## 2018-09-04 VITALS
HEIGHT: 71 IN | RESPIRATION RATE: 18 BRPM | OXYGEN SATURATION: 97 % | DIASTOLIC BLOOD PRESSURE: 78 MMHG | SYSTOLIC BLOOD PRESSURE: 136 MMHG | BODY MASS INDEX: 24.36 KG/M2 | HEART RATE: 70 BPM | TEMPERATURE: 97.8 F | WEIGHT: 174 LBS

## 2018-09-04 DIAGNOSIS — N20.0 KIDNEY STONE: Primary | ICD-10-CM

## 2018-09-04 LAB
ALBUMIN SERPL-MCNC: 3.9 G/DL (ref 3.4–4.8)
ALBUMIN/GLOB SERPL: 1.3 G/DL (ref 1.1–1.8)
ALP SERPL-CCNC: 90 U/L (ref 38–126)
ALT SERPL W P-5'-P-CCNC: 27 U/L (ref 21–72)
ANION GAP SERPL CALCULATED.3IONS-SCNC: 8 MMOL/L (ref 5–15)
AST SERPL-CCNC: 28 U/L (ref 17–59)
BASOPHILS # BLD AUTO: 0.02 10*3/MM3 (ref 0–0.2)
BASOPHILS NFR BLD AUTO: 0.4 % (ref 0–2)
BILIRUB SERPL-MCNC: 0.7 MG/DL (ref 0.2–1.3)
BILIRUB UR QL STRIP: NEGATIVE
BUN BLD-MCNC: 11 MG/DL (ref 7–21)
BUN/CREAT SERPL: 18 (ref 7–25)
CALCIUM SPEC-SCNC: 9 MG/DL (ref 8.4–10.2)
CHLORIDE SERPL-SCNC: 100 MMOL/L (ref 95–110)
CLARITY UR: CLEAR
CO2 SERPL-SCNC: 27 MMOL/L (ref 22–31)
COLOR UR: YELLOW
CREAT BLD-MCNC: 0.61 MG/DL (ref 0.7–1.3)
DEPRECATED RDW RBC AUTO: 41.6 FL (ref 35.1–43.9)
EOSINOPHIL # BLD AUTO: 0.21 10*3/MM3 (ref 0–0.7)
EOSINOPHIL NFR BLD AUTO: 4.5 % (ref 0–7)
ERYTHROCYTE [DISTWIDTH] IN BLOOD BY AUTOMATED COUNT: 12.6 % (ref 11.5–14.5)
GFR SERPL CREATININE-BSD FRML MDRD: 146 ML/MIN/1.73 (ref 63–147)
GLOBULIN UR ELPH-MCNC: 3.1 GM/DL (ref 2.3–3.5)
GLUCOSE BLD-MCNC: 86 MG/DL (ref 60–100)
GLUCOSE UR STRIP-MCNC: NEGATIVE MG/DL
HCT VFR BLD AUTO: 46.4 % (ref 39–49)
HGB BLD-MCNC: 16 G/DL (ref 13.7–17.3)
HGB UR QL STRIP.AUTO: NEGATIVE
HOLD SPECIMEN: NORMAL
IMM GRANULOCYTES # BLD: 0.03 10*3/MM3 (ref 0–0.02)
IMM GRANULOCYTES NFR BLD: 0.6 % (ref 0–0.5)
KETONES UR QL STRIP: NEGATIVE
LEUKOCYTE ESTERASE UR QL STRIP.AUTO: NEGATIVE
LIPASE SERPL-CCNC: 60 U/L (ref 23–300)
LYMPHOCYTES # BLD AUTO: 1.59 10*3/MM3 (ref 0.6–4.2)
LYMPHOCYTES NFR BLD AUTO: 33.8 % (ref 10–50)
MCH RBC QN AUTO: 31.1 PG (ref 26.5–34)
MCHC RBC AUTO-ENTMCNC: 34.5 G/DL (ref 31.5–36.3)
MCV RBC AUTO: 90.3 FL (ref 80–98)
MONOCYTES # BLD AUTO: 0.49 10*3/MM3 (ref 0–0.9)
MONOCYTES NFR BLD AUTO: 10.4 % (ref 0–12)
NEUTROPHILS # BLD AUTO: 2.36 10*3/MM3 (ref 2–8.6)
NEUTROPHILS NFR BLD AUTO: 50.3 % (ref 37–80)
NITRITE UR QL STRIP: NEGATIVE
PH UR STRIP.AUTO: 7 [PH] (ref 5–9)
PLATELET # BLD AUTO: 208 10*3/MM3 (ref 150–450)
PMV BLD AUTO: 10.1 FL (ref 8–12)
POTASSIUM BLD-SCNC: 4 MMOL/L (ref 3.5–5.1)
PROT SERPL-MCNC: 7 G/DL (ref 6.3–8.6)
PROT UR QL STRIP: NEGATIVE
RBC # BLD AUTO: 5.14 10*6/MM3 (ref 4.37–5.74)
SODIUM BLD-SCNC: 135 MMOL/L (ref 137–145)
SP GR UR STRIP: 1.02 (ref 1–1.03)
UROBILINOGEN UR QL STRIP: NORMAL
WBC NRBC COR # BLD: 4.7 10*3/MM3 (ref 3.2–9.8)
WHOLE BLOOD HOLD SPECIMEN: NORMAL

## 2018-09-04 PROCEDURE — 99284 EMERGENCY DEPT VISIT MOD MDM: CPT

## 2018-09-04 PROCEDURE — 74176 CT ABD & PELVIS W/O CONTRAST: CPT

## 2018-09-04 PROCEDURE — 85025 COMPLETE CBC W/AUTO DIFF WBC: CPT | Performed by: PHYSICIAN ASSISTANT

## 2018-09-04 PROCEDURE — 96374 THER/PROPH/DIAG INJ IV PUSH: CPT

## 2018-09-04 PROCEDURE — 83690 ASSAY OF LIPASE: CPT | Performed by: PHYSICIAN ASSISTANT

## 2018-09-04 PROCEDURE — 25010000002 KETOROLAC TROMETHAMINE PER 15 MG: Performed by: PHYSICIAN ASSISTANT

## 2018-09-04 PROCEDURE — 81003 URINALYSIS AUTO W/O SCOPE: CPT | Performed by: PHYSICIAN ASSISTANT

## 2018-09-04 PROCEDURE — 25010000002 ONDANSETRON PER 1 MG: Performed by: PHYSICIAN ASSISTANT

## 2018-09-04 PROCEDURE — 80053 COMPREHEN METABOLIC PANEL: CPT | Performed by: PHYSICIAN ASSISTANT

## 2018-09-04 PROCEDURE — 96375 TX/PRO/DX INJ NEW DRUG ADDON: CPT

## 2018-09-04 RX ORDER — TAMSULOSIN HYDROCHLORIDE 0.4 MG/1
1 CAPSULE ORAL DAILY
Qty: 30 CAPSULE | Refills: 0 | Status: SHIPPED | OUTPATIENT
Start: 2018-09-04 | End: 2019-07-19

## 2018-09-04 RX ORDER — SODIUM CHLORIDE 0.9 % (FLUSH) 0.9 %
10 SYRINGE (ML) INJECTION AS NEEDED
Status: DISCONTINUED | OUTPATIENT
Start: 2018-09-04 | End: 2018-09-04 | Stop reason: HOSPADM

## 2018-09-04 RX ORDER — NAPROXEN 500 MG/1
500 TABLET ORAL 2 TIMES DAILY WITH MEALS
Qty: 14 TABLET | Refills: 0 | Status: SHIPPED | OUTPATIENT
Start: 2018-09-04 | End: 2018-09-11

## 2018-09-04 RX ORDER — ONDANSETRON 2 MG/ML
4 INJECTION INTRAMUSCULAR; INTRAVENOUS ONCE
Status: COMPLETED | OUTPATIENT
Start: 2018-09-04 | End: 2018-09-04

## 2018-09-04 RX ORDER — KETOROLAC TROMETHAMINE 15 MG/ML
15 INJECTION, SOLUTION INTRAMUSCULAR; INTRAVENOUS ONCE
Status: COMPLETED | OUTPATIENT
Start: 2018-09-04 | End: 2018-09-04

## 2018-09-04 RX ORDER — MORPHINE SULFATE 2 MG/ML
2 INJECTION, SOLUTION INTRAMUSCULAR; INTRAVENOUS ONCE
Status: DISCONTINUED | OUTPATIENT
Start: 2018-09-04 | End: 2018-09-04

## 2018-09-04 RX ADMIN — KETOROLAC TROMETHAMINE 15 MG: 15 INJECTION, SOLUTION INTRAMUSCULAR; INTRAVENOUS at 18:51

## 2018-09-04 RX ADMIN — ONDANSETRON HYDROCHLORIDE 4 MG: 2 INJECTION, SOLUTION INTRAMUSCULAR; INTRAVENOUS at 18:50

## 2018-09-04 RX ADMIN — Medication 10 ML: at 18:54

## 2018-09-04 NOTE — ED PROVIDER NOTES
"Subjective   Patient presents to emergency department for right flank pain radiating into right groin which woke him up from sleep last night.  States he had a few episodes of nausea/vomiting last night but none today.  History of previous kidney stones and states he had to have them \"broken up\" once.  States it has been difficult to start urinating.  Denies fever/chills.          History provided by:  Patient   used: No    Abdominal Pain   Pain location:  R flank  Pain quality: sharp and stabbing    Pain radiates to:  Groin  Pain severity:  Severe  Onset quality:  Sudden  Duration:  14 hours  Timing:  Constant  Progression:  Worsening  Chronicity:  New  Context: not suspicious food intake and not trauma    Associated symptoms: dysuria, nausea and vomiting    Associated symptoms: no chest pain, no chills, no constipation, no cough, no diarrhea, no fever, no hematuria, no shortness of breath and no sore throat    Flank Pain   Associated symptoms: dysuria, nausea and vomiting    Associated symptoms: no chest pain, no chills, no constipation, no cough, no diarrhea, no fever, no hematuria, no shortness of breath and no sore throat        Review of Systems   Constitutional: Negative for chills and fever.   HENT: Negative for sore throat and trouble swallowing.    Respiratory: Negative for cough, chest tightness, shortness of breath and wheezing.    Cardiovascular: Negative for chest pain.   Gastrointestinal: Positive for abdominal pain, nausea and vomiting. Negative for abdominal distention, blood in stool, constipation and diarrhea.   Genitourinary: Positive for dysuria and flank pain. Negative for hematuria.   Musculoskeletal: Positive for back pain.   Skin: Negative for color change.   Allergic/Immunologic: Negative for immunocompromised state.   Neurological: Negative for syncope.   Hematological: Does not bruise/bleed easily.   Psychiatric/Behavioral: Negative for confusion.       Past Medical " "History:   Diagnosis Date   • Hypertension        Allergies   Allergen Reactions   • Penicillins Rash       History reviewed. No pertinent surgical history.    History reviewed. No pertinent family history.    Social History     Social History   • Marital status:      Social History Main Topics   • Smoking status: Current Every Day Smoker     Packs/day: 1.00     Types: Cigarettes   • Smokeless tobacco: Current User     Types: Snuff   • Alcohol use No   • Drug use: No   • Sexual activity: Defer     Other Topics Concern   • Not on file           Objective      /78 (BP Location: Left arm, Patient Position: Lying)   Pulse 70   Temp 97.8 °F (36.6 °C) (Temporal Artery )   Resp 18   Ht 180.3 cm (71\")   Wt 78.9 kg (174 lb)   SpO2 97%   BMI 24.27 kg/m²     Physical Exam   Constitutional: He is oriented to person, place, and time. He appears well-developed and well-nourished. No distress.   HENT:   Head: Normocephalic and atraumatic.   Eyes: Conjunctivae are normal.   Cardiovascular: Normal rate, regular rhythm, normal heart sounds and intact distal pulses.    Pulmonary/Chest: Effort normal and breath sounds normal. No respiratory distress. He has no wheezes.   Abdominal: Soft. Bowel sounds are normal. He exhibits no distension. There is tenderness in the right lower quadrant. There is CVA tenderness (right). There is no guarding.       Musculoskeletal: He exhibits no edema.   Neurological: He is alert and oriented to person, place, and time.   Skin: Skin is warm. Capillary refill takes less than 2 seconds. No erythema.   Psychiatric: He has a normal mood and affect. His behavior is normal. Thought content normal.   Nursing note and vitals reviewed.      Procedures           ED Course  ED Course as of Sep 04 1950   Tue Sep 04, 2018   1943 CT shows nonobstructive small left kidney stone, otherwise unremarkable.  [TERENCE]      ED Course User Index  [TERENCE] Manav Llanos PA-C      Results for orders placed " or performed during the hospital encounter of 09/04/18   Comprehensive Metabolic Panel   Result Value Ref Range    Glucose 86 60 - 100 mg/dL    BUN 11 7 - 21 mg/dL    Creatinine 0.61 (L) 0.70 - 1.30 mg/dL    Sodium 135 (L) 137 - 145 mmol/L    Potassium 4.0 3.5 - 5.1 mmol/L    Chloride 100 95 - 110 mmol/L    CO2 27.0 22.0 - 31.0 mmol/L    Calcium 9.0 8.4 - 10.2 mg/dL    Total Protein 7.0 6.3 - 8.6 g/dL    Albumin 3.90 3.40 - 4.80 g/dL    ALT (SGPT) 27 21 - 72 U/L    AST (SGOT) 28 17 - 59 U/L    Alkaline Phosphatase 90 38 - 126 U/L    Total Bilirubin 0.7 0.2 - 1.3 mg/dL    eGFR Non  Amer 146 63 - 147 mL/min/1.73    Globulin 3.1 2.3 - 3.5 gm/dL    A/G Ratio 1.3 1.1 - 1.8 g/dL    BUN/Creatinine Ratio 18.0 7.0 - 25.0    Anion Gap 8.0 5.0 - 15.0 mmol/L   Urinalysis With Culture If Indicated - Urine, Clean Catch   Result Value Ref Range    Color, UA Yellow Yellow, Straw, Dark Yellow, Diamond    Appearance, UA Clear Clear    pH, UA 7.0 5.0 - 9.0    Specific Gravity, UA 1.025 1.003 - 1.030    Glucose, UA Negative Negative    Ketones, UA Negative Negative    Bilirubin, UA Negative Negative    Blood, UA Negative Negative    Protein, UA Negative Negative    Leuk Esterase, UA Negative Negative    Nitrite, UA Negative Negative    Urobilinogen, UA 0.2 E.U./dL 0.2 - 1.0 E.U./dL   CBC Auto Differential   Result Value Ref Range    WBC 4.70 3.20 - 9.80 10*3/mm3    RBC 5.14 4.37 - 5.74 10*6/mm3    Hemoglobin 16.0 13.7 - 17.3 g/dL    Hematocrit 46.4 39.0 - 49.0 %    MCV 90.3 80.0 - 98.0 fL    MCH 31.1 26.5 - 34.0 pg    MCHC 34.5 31.5 - 36.3 g/dL    RDW 12.6 11.5 - 14.5 %    RDW-SD 41.6 35.1 - 43.9 fl    MPV 10.1 8.0 - 12.0 fL    Platelets 208 150 - 450 10*3/mm3    Neutrophil % 50.3 37.0 - 80.0 %    Lymphocyte % 33.8 10.0 - 50.0 %    Monocyte % 10.4 0.0 - 12.0 %    Eosinophil % 4.5 0.0 - 7.0 %    Basophil % 0.4 0.0 - 2.0 %    Immature Grans % 0.6 (H) 0.0 - 0.5 %    Neutrophils, Absolute 2.36 2.00 - 8.60 10*3/mm3    Lymphocytes,  Absolute 1.59 0.60 - 4.20 10*3/mm3    Monocytes, Absolute 0.49 0.00 - 0.90 10*3/mm3    Eosinophils, Absolute 0.21 0.00 - 0.70 10*3/mm3    Basophils, Absolute 0.02 0.00 - 0.20 10*3/mm3    Immature Grans, Absolute 0.03 (H) 0.00 - 0.02 10*3/mm3   Lipase   Result Value Ref Range    Lipase 60 23 - 300 U/L   Light Blue Top   Result Value Ref Range    Extra Tube hold for add-on    Gold Top - SST   Result Value Ref Range    Extra Tube Hold for add-ons.      Ct Abdomen Pelvis Stone Protocol    Result Date: 9/4/2018  Narrative: Right flank pain and vomiting. CT, abdomen and pelvis stone protocol. No comparison. Radiation dose-limiting techniques also utilized, including automated exposure control and adjustment of mA and/or KVP to the patient size according to ALARA (as low as reasonably achievable). The cardiac silhouette is within normal limits. The lung bases are clear. No pleural effusion is identified. The liver, gallbladder, adrenal glands, spleen and pancreas are unremarkable. No dilatation of renal collecting system is seen. There is tiny 0.1 cm stone seen in the lower pole of the left kidney (coronal image 53). No stone is seen in the right renal collecting system. The right and left ureters are unremarkable. No bowel wall thickening is identified. No disproportionately dilated bowel is seen. The appendix is visualized and normal in appearance. There are few diverticuli of the descending colon without evidence of diverticulitis. The bladder and prostate are unremarkable. No retroperitoneal or pelvic lymphadenopathy is seen. There is no evidence of free intraperitoneal air or fluid. There are degenerative changes of the lumbar spine.     Impression: CONCLUSION: 1. Tiny stone in the lower pole of the left kidney without evidence of obstruction. 2. Few diverticuli of the descending colon without evidence of diverticulitis. Electronically signed by:  Fabian Adames MD  9/4/2018 7:26 PM CDT Workstation:  [id number]                [facility]      Final diagnoses:   Kidney stone                 Manav Llanos PA-C  09/04/18 1955

## 2018-09-04 NOTE — ED NOTES
Pt presents to ED complaining of lower back that radiates to groin area/abdominal area on right side. Pt states pain started last night in the middle of the night and woke him up. Pt is 8/10 at the moment.  Pt states he has only used the bathroom one time since last night and when he did urinate it was painful and difficult to start.     Jessica Reyes, [place]  09/04/18 [id number]

## 2018-09-04 NOTE — ED NOTES
Pt states he has a history of kidney stones and this pain is the same as when he had the stones the two previous times. One time he was able to pass the stones and the other time he had to have them broken up.     Jessica Reyes RN  09/04/18 [id number]

## 2018-11-11 ENCOUNTER — APPOINTMENT (OUTPATIENT)
Dept: CT IMAGING | Facility: HOSPITAL | Age: 43
End: 2018-11-11

## 2018-11-11 ENCOUNTER — HOSPITAL ENCOUNTER (EMERGENCY)
Facility: HOSPITAL | Age: 43
Discharge: HOME | End: 2018-11-11
Attending: EMERGENCY MEDICINE | Admitting: EMERGENCY MEDICINE

## 2018-11-11 ENCOUNTER — APPOINTMENT (OUTPATIENT)
Dept: GENERAL RADIOLOGY | Facility: HOSPITAL | Age: 43
End: 2018-11-11

## 2018-11-11 VITALS
WEIGHT: 190 LBS | HEART RATE: 91 BPM | SYSTOLIC BLOOD PRESSURE: 125 MMHG | DIASTOLIC BLOOD PRESSURE: 65 MMHG | TEMPERATURE: 98.4 F | HEIGHT: 70 IN | BODY MASS INDEX: 27.2 KG/M2 | OXYGEN SATURATION: 98 % | RESPIRATION RATE: 20 BRPM

## 2018-11-11 DIAGNOSIS — R10.9 ACUTE RIGHT FLANK PAIN: Primary | ICD-10-CM

## 2018-11-11 LAB
ALBUMIN SERPL-MCNC: 4.5 G/DL (ref 3.4–4.8)
ALBUMIN/GLOB SERPL: 1.6 G/DL (ref 1.1–1.8)
ALP SERPL-CCNC: 83 U/L (ref 38–126)
ALT SERPL W P-5'-P-CCNC: 31 U/L (ref 21–72)
ANION GAP SERPL CALCULATED.3IONS-SCNC: 5 MMOL/L (ref 5–15)
AST SERPL-CCNC: 52 U/L (ref 17–59)
BASOPHILS # BLD AUTO: 0.01 10*3/MM3 (ref 0–0.2)
BASOPHILS NFR BLD AUTO: 0.2 % (ref 0–2)
BILIRUB SERPL-MCNC: 1.1 MG/DL (ref 0.2–1.3)
BUN BLD-MCNC: 17 MG/DL (ref 7–21)
BUN/CREAT SERPL: 23 (ref 7–25)
CALCIUM SPEC-SCNC: 8.8 MG/DL (ref 8.4–10.2)
CHLORIDE SERPL-SCNC: 104 MMOL/L (ref 95–110)
CO2 SERPL-SCNC: 27 MMOL/L (ref 22–31)
CREAT BLD-MCNC: 0.74 MG/DL (ref 0.7–1.3)
DEPRECATED RDW RBC AUTO: 40.8 FL (ref 35.1–43.9)
EOSINOPHIL # BLD AUTO: 0.07 10*3/MM3 (ref 0–0.7)
EOSINOPHIL NFR BLD AUTO: 1.1 % (ref 0–7)
ERYTHROCYTE [DISTWIDTH] IN BLOOD BY AUTOMATED COUNT: 12.5 % (ref 11.5–14.5)
GFR SERPL CREATININE-BSD FRML MDRD: 117 ML/MIN/1.73 (ref 63–147)
GLOBULIN UR ELPH-MCNC: 2.8 GM/DL (ref 2.3–3.5)
GLUCOSE BLD-MCNC: 101 MG/DL (ref 60–100)
HCT VFR BLD AUTO: 39.9 % (ref 39–49)
HGB BLD-MCNC: 14.1 G/DL (ref 13.7–17.3)
HOLD SPECIMEN: NORMAL
HOLD SPECIMEN: NORMAL
IMM GRANULOCYTES # BLD: 0.01 10*3/MM3 (ref 0–0.02)
IMM GRANULOCYTES NFR BLD: 0.2 % (ref 0–0.5)
LYMPHOCYTES # BLD AUTO: 1.14 10*3/MM3 (ref 0.6–4.2)
LYMPHOCYTES NFR BLD AUTO: 17.4 % (ref 10–50)
MCH RBC QN AUTO: 31.6 PG (ref 26.5–34)
MCHC RBC AUTO-ENTMCNC: 35.3 G/DL (ref 31.5–36.3)
MCV RBC AUTO: 89.5 FL (ref 80–98)
MONOCYTES # BLD AUTO: 0.8 10*3/MM3 (ref 0–0.9)
MONOCYTES NFR BLD AUTO: 12.2 % (ref 0–12)
NEUTROPHILS # BLD AUTO: 4.54 10*3/MM3 (ref 2–8.6)
NEUTROPHILS NFR BLD AUTO: 68.9 % (ref 37–80)
NT-PROBNP SERPL-MCNC: 188 PG/ML (ref 0–450)
PLATELET # BLD AUTO: 200 10*3/MM3 (ref 150–450)
PMV BLD AUTO: 9.6 FL (ref 8–12)
POTASSIUM BLD-SCNC: 3.6 MMOL/L (ref 3.5–5.1)
PROT SERPL-MCNC: 7.3 G/DL (ref 6.3–8.6)
RBC # BLD AUTO: 4.46 10*6/MM3 (ref 4.37–5.74)
SODIUM BLD-SCNC: 136 MMOL/L (ref 137–145)
TROPONIN I SERPL-MCNC: <0.012 NG/ML
TROPONIN I SERPL-MCNC: <0.012 NG/ML
WBC NRBC COR # BLD: 6.57 10*3/MM3 (ref 3.2–9.8)
WHOLE BLOOD HOLD SPECIMEN: NORMAL
WHOLE BLOOD HOLD SPECIMEN: NORMAL

## 2018-11-11 PROCEDURE — 83880 ASSAY OF NATRIURETIC PEPTIDE: CPT | Performed by: EMERGENCY MEDICINE

## 2018-11-11 PROCEDURE — 25010000002 ONDANSETRON PER 1 MG: Performed by: PHYSICIAN ASSISTANT

## 2018-11-11 PROCEDURE — 85025 COMPLETE CBC W/AUTO DIFF WBC: CPT | Performed by: EMERGENCY MEDICINE

## 2018-11-11 PROCEDURE — 84484 ASSAY OF TROPONIN QUANT: CPT | Performed by: EMERGENCY MEDICINE

## 2018-11-11 PROCEDURE — 25010000002 KETOROLAC TROMETHAMINE PER 15 MG: Performed by: PHYSICIAN ASSISTANT

## 2018-11-11 PROCEDURE — 99284 EMERGENCY DEPT VISIT MOD MDM: CPT

## 2018-11-11 PROCEDURE — 36415 COLL VENOUS BLD VENIPUNCTURE: CPT | Performed by: EMERGENCY MEDICINE

## 2018-11-11 PROCEDURE — 93010 ELECTROCARDIOGRAM REPORT: CPT | Performed by: INTERNAL MEDICINE

## 2018-11-11 PROCEDURE — 80053 COMPREHEN METABOLIC PANEL: CPT | Performed by: EMERGENCY MEDICINE

## 2018-11-11 PROCEDURE — 96374 THER/PROPH/DIAG INJ IV PUSH: CPT

## 2018-11-11 PROCEDURE — 93005 ELECTROCARDIOGRAM TRACING: CPT | Performed by: EMERGENCY MEDICINE

## 2018-11-11 PROCEDURE — 74176 CT ABD & PELVIS W/O CONTRAST: CPT

## 2018-11-11 PROCEDURE — 96361 HYDRATE IV INFUSION ADD-ON: CPT

## 2018-11-11 PROCEDURE — 93005 ELECTROCARDIOGRAM TRACING: CPT

## 2018-11-11 PROCEDURE — 96375 TX/PRO/DX INJ NEW DRUG ADDON: CPT

## 2018-11-11 PROCEDURE — 71045 X-RAY EXAM CHEST 1 VIEW: CPT

## 2018-11-11 RX ORDER — KETOROLAC TROMETHAMINE 30 MG/ML
30 INJECTION, SOLUTION INTRAMUSCULAR; INTRAVENOUS ONCE
Status: COMPLETED | OUTPATIENT
Start: 2018-11-11 | End: 2018-11-11

## 2018-11-11 RX ORDER — ASPIRIN 325 MG
325 TABLET ORAL ONCE
Status: COMPLETED | OUTPATIENT
Start: 2018-11-11 | End: 2018-11-11

## 2018-11-11 RX ORDER — SODIUM CHLORIDE 0.9 % (FLUSH) 0.9 %
10 SYRINGE (ML) INJECTION AS NEEDED
Status: DISCONTINUED | OUTPATIENT
Start: 2018-11-11 | End: 2018-11-11 | Stop reason: HOSPADM

## 2018-11-11 RX ORDER — ONDANSETRON 2 MG/ML
4 INJECTION INTRAMUSCULAR; INTRAVENOUS ONCE
Status: COMPLETED | OUTPATIENT
Start: 2018-11-11 | End: 2018-11-11

## 2018-11-11 RX ADMIN — SODIUM CHLORIDE 1000 ML: 900 INJECTION, SOLUTION INTRAVENOUS at 17:19

## 2018-11-11 RX ADMIN — ASPIRIN 325 MG: 325 TABLET, COATED ORAL at 15:44

## 2018-11-11 RX ADMIN — KETOROLAC TROMETHAMINE 30 MG: 30 INJECTION, SOLUTION INTRAMUSCULAR; INTRAVENOUS at 19:08

## 2018-11-11 RX ADMIN — ONDANSETRON 4 MG: 2 INJECTION INTRAMUSCULAR; INTRAVENOUS at 19:08

## 2018-11-11 NOTE — ED NOTES
Patient states that he is not able to leave a urine sample at this time.     Debi Fernandez RN  11/11/18 [id number]

## 2018-11-11 NOTE — ED NOTES
Patient refusing to give urine sample.  Patient refusing in/out catheter     Debi Fernandez RN  11/11/18 0573

## 2018-11-12 LAB
HOLD SPECIMEN: NORMAL
WHOLE BLOOD HOLD SPECIMEN: NORMAL

## 2018-11-12 NOTE — ED PROVIDER NOTES
Subjective     History provided by:  Patient   used: No    Flank Pain   Pain location:  R flank  Pain quality: sharp and shooting    Pain radiates to:  Does not radiate  Pain severity:  Mild  Onset quality:  Gradual  Duration:  1 day  Timing:  Constant  Progression:  Unchanged  Chronicity:  Recurrent  Context: not alcohol use, not awakening from sleep, not diet changes, not eating, not laxative use, not medication withdrawal, not previous surgeries, not recent illness, not recent sexual activity, not recent travel, not retching, not sick contacts, not suspicious food intake and not trauma    Relieved by:  Nothing  Worsened by:  Nothing  Ineffective treatments:  None tried  Associated symptoms: cough and nausea    Associated symptoms: no anorexia, no belching, no chest pain, no chills, no constipation, no diarrhea, no dysuria, no fatigue, no fever, no flatus, no hematemesis, no hematochezia, no hematuria, no melena, no shortness of breath, no sore throat, no vaginal bleeding, no vaginal discharge and no vomiting    Risk factors: no alcohol abuse, no aspirin use, not elderly, has not had multiple surgeries, no NSAID use, not obese, not pregnant and no recent hospitalization        Review of Systems   Constitutional: Negative.  Negative for chills, fatigue and fever.   HENT: Negative.  Negative for sore throat.    Eyes: Negative.    Respiratory: Positive for cough. Negative for apnea, choking, chest tightness, shortness of breath, wheezing and stridor.    Cardiovascular: Negative.  Negative for chest pain.   Gastrointestinal: Positive for nausea. Negative for abdominal distention, abdominal pain, anal bleeding, anorexia, blood in stool, constipation, diarrhea, flatus, hematemesis, hematochezia, melena, rectal pain and vomiting.   Endocrine: Negative.    Genitourinary: Positive for flank pain. Negative for decreased urine volume, difficulty urinating, discharge, dysuria, enuresis, frequency, genital  sores, hematuria, penile pain, penile swelling, scrotal swelling, testicular pain, urgency, vaginal bleeding and vaginal discharge.   Musculoskeletal: Negative for arthralgias, back pain, gait problem, joint swelling, myalgias, neck pain and neck stiffness.   Skin: Negative.    Allergic/Immunologic: Negative.    Neurological: Negative.    Hematological: Negative.    Psychiatric/Behavioral: Negative.        Past Medical History:   Diagnosis Date   • Hypertension        Allergies   Allergen Reactions   • Penicillins Rash       History reviewed. No pertinent surgical history.    History reviewed. No pertinent family history.    Social History     Socioeconomic History   • Marital status:      Spouse name: Not on file   • Number of children: Not on file   • Years of education: Not on file   • Highest education level: Not on file   Tobacco Use   • Smoking status: Current Every Day Smoker     Packs/day: 1.00     Types: Cigarettes   • Smokeless tobacco: Current User     Types: Snuff   Substance and Sexual Activity   • Alcohol use: No     Alcohol/week: 3.0 oz     Types: 5 Cans of beer per week   • Drug use: No   • Sexual activity: Defer           Objective   Physical Exam   Constitutional: He is oriented to person, place, and time. He appears well-developed and well-nourished.  Non-toxic appearance. He does not appear ill. No distress.   HENT:   Head: Normocephalic and atraumatic.   Mouth/Throat: No oropharyngeal exudate.   Eyes: Conjunctivae and EOM are normal. Pupils are equal, round, and reactive to light. Right eye exhibits no discharge. Left eye exhibits no discharge. No scleral icterus.   Neck: Normal range of motion. Neck supple. No hepatojugular reflux and no JVD present. No tracheal deviation present. No thyromegaly present.   Cardiovascular: Normal rate, regular rhythm, normal heart sounds, intact distal pulses and normal pulses.  No extrasystoles are present. PMI is not displaced. Exam reveals no gallop,  no S3, no S4, no distant heart sounds and no friction rub.   No murmur heard.   No systolic murmur is present.   No diastolic murmur is present.  Pulmonary/Chest: Effort normal and breath sounds normal. No accessory muscle usage or stridor. No tachypnea. No respiratory distress. He has no decreased breath sounds. He has no wheezes. He has no rhonchi. He has no rales. He exhibits no tenderness.   Abdominal: Soft. Bowel sounds are normal. He exhibits no distension, no ascites and no mass. There is no splenomegaly or hepatomegaly. There is no tenderness. There is no rebound and no guarding. No hernia.   Musculoskeletal: Normal range of motion. He exhibits no edema, tenderness or deformity.        Right shoulder: He exhibits pain.        Arms:       Right lower leg: Normal. He exhibits no tenderness and no edema.        Left lower leg: He exhibits no tenderness and no edema.   Lymphadenopathy:     He has no cervical adenopathy.   Neurological: He is alert and oriented to person, place, and time. He has normal reflexes. He is not disoriented. He displays normal reflexes. No cranial nerve deficit. He exhibits normal muscle tone. Coordination normal.   Skin: Skin is warm and dry. No abrasion, no ecchymosis and no rash noted. He is not diaphoretic. No cyanosis or erythema. No pallor. Nails show no clubbing.   Psychiatric: He has a normal mood and affect. His behavior is normal. Judgment and thought content normal. His mood appears not anxious. He is not agitated.   Nursing note and vitals reviewed.      Procedures           ED Course      Labs Reviewed   COMPREHENSIVE METABOLIC PANEL - Abnormal; Notable for the following components:       Result Value    Glucose 101 (*)     Sodium 136 (*)     All other components within normal limits   CBC WITH AUTO DIFFERENTIAL - Abnormal; Notable for the following components:    Monocyte % 12.2 (*)     All other components within normal limits   TROPONIN (IN-HOUSE) - Normal   TROPONIN  (IN-HOUSE) - Normal   BNP (IN-HOUSE) - Normal   RAINBOW DRAW    Narrative:     The following orders were created for panel order [place] Draw.  Procedure                               Abnormality         Status                     ---------                               -----------         ------                     Light Blue Top[[id number]]                                                              Green Top (Gel)[[id number]]                                  Final result               Lavender Top[[id number]]                                     Final result               Gold Top - SST[[id number]]                                                                Please view results for these tests on the individual orders.   URINALYSIS W/ MICROSCOPIC IF INDICATED (NO CULTURE)   URINE DRUG SCREEN   CBC AND DIFFERENTIAL    Narrative:     The following orders were created for panel order CBC & Differential.  Procedure                               Abnormality         Status                     ---------                               -----------         ------                     CBC Auto Differential[[id number]]        Abnormal            Final result                 Please view results for these tests on the individual orders.   GREEN TOP   LAVENDER TOP   EXTRA TUBES    Narrative:     The following orders were created for panel order Extra Tubes.  Procedure                               Abnormality         Status                     ---------                               -----------         ------                     Light Blue Top[[id number]]                                   Final result               Gold Top - SST[[id number]]                                   Final result                 Please view results for these tests on the individual orders.   LIGHT BLUE TOP   GOLD TOP - SST   LIGHT BLUE TOP   GOLD TOP - SST     Ct Abdomen Pelvis Without Contrast    Result Date: 11/11/2018  Narrative: EXAM:  Computed Tomography  REGION:  Abdomen and Pelvis INDICATION:  Right flank pain COMPARISON:  none TECHNIQUE:    - stone protocol    - contrast:  none This exam was performed according to the departmental dose-optimization program which includes automated exposure control, adjustment of the mA and/or kV according to patient size and/or use of iterative reconstruction technique.  Please note: Excessive patient motion artifact limits assessment    COMMENTS: - - - CT THORAX (inferior, visualized portions): - - -   - lung bases:  negative   - pleura:  negative   - misc:  unremarkable  - - -CT ABDOMEN: - - -   - Kidney, RIGHT:     - size:  normal ; contour:  normal      - perinephric stranding:  none     - nephrolithiasis:  none     - collecting system: not dilated     - misc:   - Ureter, RIGHT:     - caliber:  normal     - course:  normal     - ureterolithiasis:  none   - Kidney, LEFT:      - size:  normal ; contour:  normal       - perinephric stranding:  none      - nephrolithiasis:  none      - collecting system: not dilated      - misc:  - Ureter, LEFT:     - caliber:  normal     - course:  normal     - ureterolithiasis:  none   - Misc:      - solid organs (limited due to the lack of intravenous contrast):  grossly negative     - visualized bowel (limited due to the lack of oral contrast):  grossly negative     - misc: - - -CT Pelvis: - - -    - Bladder:  no gross evidence of pathology.    - Misc:       - solid organs (limited due to the lack of intravenous contrast):  grossly negative       - visualized bowel (limited due to the lack of oral contrast):  grossly negative       - misc:   .             Impression: CONCLUSION: 1.  Excessive patient motion artifact limits assessment. 2. No gross evidence of nephro/ureterolithiasis.     Electronically signed by:  GIO Carr MD  11/11/2018 6:04 PM CST Workstation: [id number]    Xr Chest 1 View    Result Date: 11/11/2018  Narrative: EXAM:         Radiograph(s), Chest VIEWS:   Frontal  ; 1      DATE/TIME:  11/11/2018 4:05 PM CST            INDICATION:   Chest Pain triage protocol chest pain  COMPARISON:  CXR: 3/25/17         FINDINGS:         - lines/tubes:    none   - cardiac:         size within normal limits       - mediastinum: contour within normal limits       - lungs:         no focal air space process, pulmonary interstitial edema, nodule(s)/mass           - pleura:         no evidence of  fluid                - osseous:         unremarkable for age                - misc.:        Impression: CONCLUSION:    1. No evidence of an active cardiopulmonary process.                                                  Electronically signed by:  GIO Carr MD  11/11/2018 4:06 PM CST Workstation: [id number]    Patient refused urine sample.             MDM      Final diagnoses:   Acute right flank pain            Bernice Shelby PA  11/11/18 1939

## 2019-04-13 ENCOUNTER — APPOINTMENT (OUTPATIENT)
Dept: GENERAL RADIOLOGY | Facility: HOSPITAL | Age: 44
End: 2019-04-13

## 2019-04-13 ENCOUNTER — HOSPITAL ENCOUNTER (INPATIENT)
Facility: HOSPITAL | Age: 44
LOS: 6 days | Discharge: HOME | End: 2019-04-19
Attending: EMERGENCY MEDICINE | Admitting: INTERNAL MEDICINE

## 2019-04-13 ENCOUNTER — APPOINTMENT (OUTPATIENT)
Dept: CT IMAGING | Facility: HOSPITAL | Age: 44
End: 2019-04-13

## 2019-04-13 DIAGNOSIS — Z78.9 IMPAIRED MOBILITY AND ACTIVITIES OF DAILY LIVING: ICD-10-CM

## 2019-04-13 DIAGNOSIS — R26.89 IMPAIRMENT OF BALANCE: ICD-10-CM

## 2019-04-13 DIAGNOSIS — R41.82 ALTERED MENTAL STATUS, UNSPECIFIED ALTERED MENTAL STATUS TYPE: ICD-10-CM

## 2019-04-13 DIAGNOSIS — R13.10 DYSPHAGIA, UNSPECIFIED TYPE: ICD-10-CM

## 2019-04-13 DIAGNOSIS — T50.901A ACCIDENTAL DRUG OVERDOSE, INITIAL ENCOUNTER: Primary | ICD-10-CM

## 2019-04-13 DIAGNOSIS — Z74.09 IMPAIRED MOBILITY AND ACTIVITIES OF DAILY LIVING: ICD-10-CM

## 2019-04-13 LAB
ALBUMIN SERPL-MCNC: 4.7 G/DL (ref 3.5–5.2)
ALBUMIN/GLOB SERPL: 1.3 G/DL
ALP SERPL-CCNC: 86 U/L (ref 39–117)
ALT SERPL W P-5'-P-CCNC: 46 U/L (ref 1–41)
AMPHET+METHAMPHET UR QL: POSITIVE
ANION GAP SERPL CALCULATED.3IONS-SCNC: 18 MMOL/L
APAP SERPL-MCNC: <5 MCG/ML (ref 10–30)
ARTERIAL PATENCY WRIST A: ABNORMAL
AST SERPL-CCNC: 67 U/L (ref 1–40)
ATMOSPHERIC PRESS: 742 MMHG
BACTERIA UR QL AUTO: ABNORMAL /HPF
BARBITURATES UR QL SCN: NEGATIVE
BASE EXCESS BLDA CALC-SCNC: -2.7 MMOL/L (ref 0–2)
BASOPHILS # BLD AUTO: 0.05 10*3/MM3 (ref 0–0.2)
BASOPHILS NFR BLD AUTO: 0.3 % (ref 0–1.5)
BDY SITE: ABNORMAL
BENZODIAZ UR QL SCN: NEGATIVE
BILIRUB SERPL-MCNC: 1.2 MG/DL (ref 0.2–1.2)
BILIRUB UR QL STRIP: ABNORMAL
BUN BLD-MCNC: 27 MG/DL (ref 6–20)
BUN/CREAT SERPL: 17.5 (ref 7–25)
CALCIUM SPEC-SCNC: 9.9 MG/DL (ref 8.6–10.5)
CANNABINOIDS SERPL QL: NEGATIVE
CHLORIDE SERPL-SCNC: 104 MMOL/L (ref 98–107)
CK SERPL-CCNC: 1444 U/L (ref 20–200)
CLARITY UR: ABNORMAL
CO2 SERPL-SCNC: 26 MMOL/L (ref 22–29)
COCAINE UR QL: NEGATIVE
COLOR UR: ABNORMAL
CREAT BLD-MCNC: 1.54 MG/DL (ref 0.76–1.27)
DEPRECATED RDW RBC AUTO: 40.3 FL (ref 37–54)
EOSINOPHIL # BLD AUTO: 0.01 10*3/MM3 (ref 0–0.4)
EOSINOPHIL NFR BLD AUTO: 0.1 % (ref 0.3–6.2)
ERYTHROCYTE [DISTWIDTH] IN BLOOD BY AUTOMATED COUNT: 12.4 % (ref 12.3–15.4)
ETHANOL BLD-MCNC: <10 MG/DL (ref 0–10)
ETHANOL UR QL: <0.01 %
GFR SERPL CREATININE-BSD FRML MDRD: 50 ML/MIN/1.73
GLOBULIN UR ELPH-MCNC: 3.5 GM/DL
GLUCOSE BLD-MCNC: 49 MG/DL (ref 65–99)
GLUCOSE BLDC GLUCOMTR-MCNC: 87 MG/DL (ref 70–130)
GLUCOSE UR STRIP-MCNC: NEGATIVE MG/DL
HCO3 BLDA-SCNC: 24.3 MMOL/L (ref 20–26)
HCT VFR BLD AUTO: 47.3 % (ref 37.5–51)
HGB BLD-MCNC: 16.5 G/DL (ref 13–17.7)
HGB UR QL STRIP.AUTO: ABNORMAL
HOLD SPECIMEN: NORMAL
HOLD SPECIMEN: NORMAL
HOROWITZ INDEX BLD+IHG-RTO: 50 %
HYALINE CASTS UR QL AUTO: ABNORMAL /LPF
IMM GRANULOCYTES # BLD AUTO: 0.1 10*3/MM3 (ref 0–0.05)
IMM GRANULOCYTES NFR BLD AUTO: 0.6 % (ref 0–0.5)
KETONES UR QL STRIP: ABNORMAL
LEUKOCYTE ESTERASE UR QL STRIP.AUTO: NEGATIVE
LYMPHOCYTES # BLD AUTO: 1.55 10*3/MM3 (ref 0.7–3.1)
LYMPHOCYTES NFR BLD AUTO: 8.5 % (ref 19.6–45.3)
Lab: ABNORMAL
MAGNESIUM SERPL-MCNC: 2.7 MG/DL (ref 1.6–2.6)
MCH RBC QN AUTO: 31.4 PG (ref 26.6–33)
MCHC RBC AUTO-ENTMCNC: 34.9 G/DL (ref 31.5–35.7)
MCV RBC AUTO: 90.1 FL (ref 79–97)
METHADONE UR QL SCN: NEGATIVE
MODALITY: ABNORMAL
MONOCYTES # BLD AUTO: 1.16 10*3/MM3 (ref 0.1–0.9)
MONOCYTES NFR BLD AUTO: 6.4 % (ref 5–12)
NEUTROPHILS # BLD AUTO: 15.28 10*3/MM3 (ref 1.4–7)
NEUTROPHILS NFR BLD AUTO: 84.1 % (ref 42.7–76)
NITRITE UR QL STRIP: NEGATIVE
NRBC BLD AUTO-RTO: 0 /100 WBC (ref 0–0)
NT-PROBNP SERPL-MCNC: 245.6 PG/ML (ref 5–450)
OPIATES UR QL: POSITIVE
OXYCODONE UR QL SCN: NEGATIVE
PAW @ PEAK INSP FLOW SETTING VENT: 13 CMH2O
PCO2 BLDA: 49.7 MM HG (ref 35–45)
PEEP RESPIRATORY: 5 CM[H2O]
PH BLDA: 7.3 PH UNITS (ref 7.35–7.45)
PH UR STRIP.AUTO: 6 [PH] (ref 5–9)
PLATELET # BLD AUTO: 345 10*3/MM3 (ref 140–450)
PMV BLD AUTO: 9.5 FL (ref 6–12)
PO2 BLDA: 146 MM HG (ref 83–108)
POTASSIUM BLD-SCNC: 3.8 MMOL/L (ref 3.5–5.2)
PROT SERPL-MCNC: 8.2 G/DL (ref 6–8.5)
PROT UR QL STRIP: ABNORMAL
RBC # BLD AUTO: 5.25 10*6/MM3 (ref 4.14–5.8)
RBC # UR: ABNORMAL /HPF
REF LAB TEST METHOD: ABNORMAL
SALICYLATES SERPL-MCNC: <0.3 MG/DL
SAO2 % BLDCOA: 98.9 % (ref 94–99)
SET MECH RESP RATE: 14
SODIUM BLD-SCNC: 148 MMOL/L (ref 136–145)
SP GR UR STRIP: 1.03 (ref 1–1.03)
SPERM URNS QL MICRO: ABNORMAL /HPF
SQUAMOUS #/AREA URNS HPF: ABNORMAL /HPF
TROPONIN T SERPL-MCNC: 0.02 NG/ML (ref 0–0.03)
UROBILINOGEN UR QL STRIP: ABNORMAL
VENTILATOR MODE: ABNORMAL
VT ON VENT VENT: 500 ML
WBC NRBC COR # BLD: 18.15 10*3/MM3 (ref 3.4–10.8)
WBC UR QL AUTO: ABNORMAL /HPF
WHOLE BLOOD HOLD SPECIMEN: NORMAL
WHOLE BLOOD HOLD SPECIMEN: NORMAL

## 2019-04-13 PROCEDURE — 71045 X-RAY EXAM CHEST 1 VIEW: CPT

## 2019-04-13 PROCEDURE — 0BH18EZ INSERTION OF ENDOTRACHEAL AIRWAY INTO TRACHEA, VIA NATURAL OR ARTIFICIAL OPENING ENDOSCOPIC: ICD-10-PCS | Performed by: FAMILY MEDICINE

## 2019-04-13 PROCEDURE — 31500 INSERT EMERGENCY AIRWAY: CPT

## 2019-04-13 PROCEDURE — 93010 ELECTROCARDIOGRAM REPORT: CPT | Performed by: INTERNAL MEDICINE

## 2019-04-13 PROCEDURE — 80307 DRUG TEST PRSMV CHEM ANLYZR: CPT | Performed by: EMERGENCY MEDICINE

## 2019-04-13 PROCEDURE — 80053 COMPREHEN METABOLIC PANEL: CPT | Performed by: EMERGENCY MEDICINE

## 2019-04-13 PROCEDURE — 93005 ELECTROCARDIOGRAM TRACING: CPT | Performed by: EMERGENCY MEDICINE

## 2019-04-13 PROCEDURE — 94799 UNLISTED PULMONARY SVC/PX: CPT

## 2019-04-13 PROCEDURE — 25010000002 SUCCINYLCHOLINE PER 20 MG: Performed by: EMERGENCY MEDICINE

## 2019-04-13 PROCEDURE — 83605 ASSAY OF LACTIC ACID: CPT | Performed by: EMERGENCY MEDICINE

## 2019-04-13 PROCEDURE — 25010000002 MIDAZOLAM PER 1 MG

## 2019-04-13 PROCEDURE — 87040 BLOOD CULTURE FOR BACTERIA: CPT | Performed by: EMERGENCY MEDICINE

## 2019-04-13 PROCEDURE — 82962 GLUCOSE BLOOD TEST: CPT

## 2019-04-13 PROCEDURE — 51702 INSERT TEMP BLADDER CATH: CPT

## 2019-04-13 PROCEDURE — 25010000002 MIDAZOLAM PER 1 MG: Performed by: FAMILY MEDICINE

## 2019-04-13 PROCEDURE — 82550 ASSAY OF CK (CPK): CPT | Performed by: EMERGENCY MEDICINE

## 2019-04-13 PROCEDURE — 84484 ASSAY OF TROPONIN QUANT: CPT | Performed by: EMERGENCY MEDICINE

## 2019-04-13 PROCEDURE — 82803 BLOOD GASES ANY COMBINATION: CPT

## 2019-04-13 PROCEDURE — 83735 ASSAY OF MAGNESIUM: CPT | Performed by: EMERGENCY MEDICINE

## 2019-04-13 PROCEDURE — 94760 N-INVAS EAR/PLS OXIMETRY 1: CPT

## 2019-04-13 PROCEDURE — 70450 CT HEAD/BRAIN W/O DYE: CPT

## 2019-04-13 PROCEDURE — 85025 COMPLETE CBC W/AUTO DIFF WBC: CPT | Performed by: EMERGENCY MEDICINE

## 2019-04-13 PROCEDURE — 99291 CRITICAL CARE FIRST HOUR: CPT

## 2019-04-13 PROCEDURE — 5A1945Z RESPIRATORY VENTILATION, 24-96 CONSECUTIVE HOURS: ICD-10-PCS | Performed by: FAMILY MEDICINE

## 2019-04-13 PROCEDURE — 94002 VENT MGMT INPAT INIT DAY: CPT

## 2019-04-13 PROCEDURE — 25010000003 PHENOBARBITAL PER 120 MG: Performed by: INTERNAL MEDICINE

## 2019-04-13 PROCEDURE — 25010000002 LORAZEPAM PER 2 MG: Performed by: EMERGENCY MEDICINE

## 2019-04-13 PROCEDURE — 25010000002 PROPOFOL 10 MG/ML EMULSION

## 2019-04-13 PROCEDURE — 25010000002 FENTANYL CITRATE (PF) 100 MCG/2ML SOLUTION

## 2019-04-13 PROCEDURE — 83880 ASSAY OF NATRIURETIC PEPTIDE: CPT | Performed by: EMERGENCY MEDICINE

## 2019-04-13 PROCEDURE — 36600 WITHDRAWAL OF ARTERIAL BLOOD: CPT

## 2019-04-13 PROCEDURE — 81001 URINALYSIS AUTO W/SCOPE: CPT | Performed by: EMERGENCY MEDICINE

## 2019-04-13 PROCEDURE — 99285 EMERGENCY DEPT VISIT HI MDM: CPT

## 2019-04-13 RX ORDER — SUCCINYLCHOLINE CHLORIDE 20 MG/ML
100 INJECTION INTRAMUSCULAR; INTRAVENOUS ONCE
Status: COMPLETED | OUTPATIENT
Start: 2019-04-13 | End: 2019-04-13

## 2019-04-13 RX ORDER — FENTANYL CITRATE 50 UG/ML
INJECTION, SOLUTION INTRAMUSCULAR; INTRAVENOUS
Status: COMPLETED
Start: 2019-04-13 | End: 2019-04-13

## 2019-04-13 RX ORDER — SODIUM CHLORIDE 0.9 % (FLUSH) 0.9 %
10 SYRINGE (ML) INJECTION AS NEEDED
Status: DISCONTINUED | OUTPATIENT
Start: 2019-04-13 | End: 2019-04-19 | Stop reason: HOSPADM

## 2019-04-13 RX ORDER — SODIUM CHLORIDE 9 MG/ML
125 INJECTION, SOLUTION INTRAVENOUS CONTINUOUS
Status: DISCONTINUED | OUTPATIENT
Start: 2019-04-13 | End: 2019-04-14

## 2019-04-13 RX ORDER — LORAZEPAM 2 MG/ML
2 INJECTION INTRAMUSCULAR ONCE
Status: COMPLETED | OUTPATIENT
Start: 2019-04-13 | End: 2019-04-13

## 2019-04-13 RX ORDER — FENTANYL CITRATE 50 UG/ML
100 INJECTION, SOLUTION INTRAMUSCULAR; INTRAVENOUS ONCE
Status: COMPLETED | OUTPATIENT
Start: 2019-04-13 | End: 2019-04-13

## 2019-04-13 RX ORDER — MIDAZOLAM HYDROCHLORIDE 1 MG/ML
4 INJECTION INTRAMUSCULAR; INTRAVENOUS ONCE
Status: COMPLETED | OUTPATIENT
Start: 2019-04-13 | End: 2019-04-13

## 2019-04-13 RX ORDER — ETOMIDATE 2 MG/ML
20 INJECTION INTRAVENOUS ONCE
Status: COMPLETED | OUTPATIENT
Start: 2019-04-13 | End: 2019-04-13

## 2019-04-13 RX ORDER — PROPOFOL 10 MG/ML
VIAL (ML) INTRAVENOUS
Status: COMPLETED
Start: 2019-04-13 | End: 2019-04-13

## 2019-04-13 RX ORDER — PHENOBARBITAL SODIUM 130 MG/ML
260 INJECTION INTRAMUSCULAR ONCE
Status: COMPLETED | OUTPATIENT
Start: 2019-04-13 | End: 2019-04-13

## 2019-04-13 RX ORDER — DEXTROSE MONOHYDRATE 25 G/50ML
50 INJECTION, SOLUTION INTRAVENOUS
Status: DISCONTINUED | OUTPATIENT
Start: 2019-04-13 | End: 2019-04-18

## 2019-04-13 RX ORDER — MIDAZOLAM HYDROCHLORIDE 1 MG/ML
INJECTION INTRAMUSCULAR; INTRAVENOUS
Status: COMPLETED
Start: 2019-04-13 | End: 2019-04-13

## 2019-04-13 RX ORDER — MIDAZOLAM HYDROCHLORIDE 1 MG/ML
2 INJECTION INTRAMUSCULAR; INTRAVENOUS ONCE
Status: COMPLETED | OUTPATIENT
Start: 2019-04-13 | End: 2019-04-13

## 2019-04-13 RX ADMIN — MIDAZOLAM HYDROCHLORIDE 4 MG: 2 INJECTION, SOLUTION INTRAMUSCULAR; INTRAVENOUS at 21:19

## 2019-04-13 RX ADMIN — SODIUM CHLORIDE 125 ML/HR: 900 INJECTION, SOLUTION INTRAVENOUS at 18:47

## 2019-04-13 RX ADMIN — PROPOFOL 10 MCG/KG/MIN: 10 INJECTION, EMULSION INTRAVENOUS at 18:53

## 2019-04-13 RX ADMIN — ETOMIDATE 20 MG: 2 INJECTION, SOLUTION INTRAVENOUS at 18:30

## 2019-04-13 RX ADMIN — MIDAZOLAM HYDROCHLORIDE 2 MG: 2 INJECTION, SOLUTION INTRAMUSCULAR; INTRAVENOUS at 18:43

## 2019-04-13 RX ADMIN — SUCCINYLCHOLINE CHLORIDE 100 MG: 20 INJECTION, SOLUTION INTRAMUSCULAR; INTRAVENOUS at 18:31

## 2019-04-13 RX ADMIN — LORAZEPAM 2 MG: 2 INJECTION INTRAMUSCULAR; INTRAVENOUS at 18:30

## 2019-04-13 RX ADMIN — SODIUM CHLORIDE 1000 ML: 900 INJECTION, SOLUTION INTRAVENOUS at 18:30

## 2019-04-13 RX ADMIN — MIDAZOLAM HYDROCHLORIDE 2 MG: 1 INJECTION INTRAMUSCULAR; INTRAVENOUS at 18:43

## 2019-04-13 RX ADMIN — DEXTROSE MONOHYDRATE 50 ML: 25 INJECTION, SOLUTION INTRAVENOUS at 19:25

## 2019-04-13 RX ADMIN — FENTANYL CITRATE 100 MCG: 50 INJECTION, SOLUTION INTRAMUSCULAR; INTRAVENOUS at 22:27

## 2019-04-13 RX ADMIN — LORAZEPAM 2 MG: 2 INJECTION INTRAMUSCULAR; INTRAVENOUS at 18:25

## 2019-04-13 RX ADMIN — PHENOBARBITAL SODIUM 260 MG: 130 INJECTION INTRAMUSCULAR; INTRAVENOUS at 22:34

## 2019-04-13 RX ADMIN — SODIUM CHLORIDE 125 ML/HR: 900 INJECTION, SOLUTION INTRAVENOUS at 21:51

## 2019-04-14 ENCOUNTER — APPOINTMENT (OUTPATIENT)
Dept: GENERAL RADIOLOGY | Facility: HOSPITAL | Age: 44
End: 2019-04-14

## 2019-04-14 LAB
ANION GAP SERPL CALCULATED.3IONS-SCNC: 14 MMOL/L
ARTERIAL PATENCY WRIST A: ABNORMAL
ATMOSPHERIC PRESS: 735 MMHG
BASE EXCESS BLDA CALC-SCNC: -3.2 MMOL/L (ref 0–2)
BDY SITE: ABNORMAL
BUN BLD-MCNC: 23 MG/DL (ref 6–20)
BUN/CREAT SERPL: 29.5 (ref 7–25)
CA-I BLD-MCNC: 4.28 MG/DL (ref 4.6–5.6)
CALCIUM SPEC-SCNC: 8.2 MG/DL (ref 8.6–10.5)
CHLORIDE SERPL-SCNC: 110 MMOL/L (ref 98–107)
CO2 SERPL-SCNC: 20 MMOL/L (ref 22–29)
CREAT BLD-MCNC: 0.78 MG/DL (ref 0.76–1.27)
D-LACTATE SERPL-SCNC: 1 MMOL/L (ref 0.5–2)
DEPRECATED RDW RBC AUTO: 42 FL (ref 37–54)
ERYTHROCYTE [DISTWIDTH] IN BLOOD BY AUTOMATED COUNT: 12.6 % (ref 12.3–15.4)
GFR SERPL CREATININE-BSD FRML MDRD: 110 ML/MIN/1.73
GLUCOSE BLD-MCNC: 93 MG/DL (ref 65–99)
GLUCOSE BLDC GLUCOMTR-MCNC: 84 MG/DL (ref 70–130)
HCO3 BLDA-SCNC: 23.1 MMOL/L (ref 20–26)
HCT VFR BLD AUTO: 41.4 % (ref 37.5–51)
HGB BLD-MCNC: 14.1 G/DL (ref 13–17.7)
HOROWITZ INDEX BLD+IHG-RTO: 35 %
Lab: ABNORMAL
MAGNESIUM SERPL-MCNC: 2.5 MG/DL (ref 1.6–2.6)
MCH RBC QN AUTO: 31.3 PG (ref 26.6–33)
MCHC RBC AUTO-ENTMCNC: 34.1 G/DL (ref 31.5–35.7)
MCV RBC AUTO: 92 FL (ref 79–97)
MODALITY: ABNORMAL
PAW @ PEAK INSP FLOW SETTING VENT: 19 CMH2O
PCO2 BLDA: 45 MM HG (ref 35–45)
PEEP RESPIRATORY: 5 CM[H2O]
PH BLDA: 7.32 PH UNITS (ref 7.35–7.45)
PLATELET # BLD AUTO: 237 10*3/MM3 (ref 140–450)
PMV BLD AUTO: 9 FL (ref 6–12)
PO2 BLDA: 128 MM HG (ref 83–108)
POTASSIUM BLD-SCNC: 4.3 MMOL/L (ref 3.5–5.2)
RBC # BLD AUTO: 4.5 10*6/MM3 (ref 4.14–5.8)
SAO2 % BLDCOA: 98.3 % (ref 94–99)
SET MECH RESP RATE: 18
SODIUM BLD-SCNC: 144 MMOL/L (ref 136–145)
VENTILATOR MODE: AC
VT ON VENT VENT: 575 ML
WBC NRBC COR # BLD: 12.99 10*3/MM3 (ref 3.4–10.8)

## 2019-04-14 PROCEDURE — 82803 BLOOD GASES ANY COMBINATION: CPT

## 2019-04-14 PROCEDURE — 94003 VENT MGMT INPAT SUBQ DAY: CPT

## 2019-04-14 PROCEDURE — 80048 BASIC METABOLIC PNL TOTAL CA: CPT | Performed by: INTERNAL MEDICINE

## 2019-04-14 PROCEDURE — 36600 WITHDRAWAL OF ARTERIAL BLOOD: CPT

## 2019-04-14 PROCEDURE — 71045 X-RAY EXAM CHEST 1 VIEW: CPT

## 2019-04-14 PROCEDURE — 25010000002 PROPOFOL 1000 MG/ML EMULSION: Performed by: EMERGENCY MEDICINE

## 2019-04-14 PROCEDURE — 94799 UNLISTED PULMONARY SVC/PX: CPT

## 2019-04-14 PROCEDURE — 25010000002 ENOXAPARIN PER 10 MG: Performed by: INTERNAL MEDICINE

## 2019-04-14 PROCEDURE — 25010000003 PHENOBARBITAL PER 120 MG: Performed by: INTERNAL MEDICINE

## 2019-04-14 PROCEDURE — 83735 ASSAY OF MAGNESIUM: CPT | Performed by: INTERNAL MEDICINE

## 2019-04-14 PROCEDURE — 25010000002 MIDAZOLAM 50 MG/10ML SOLUTION 10 ML VIAL: Performed by: INTERNAL MEDICINE

## 2019-04-14 PROCEDURE — 25010000002 FENTANYL CITRATE (PF) 100 MCG/2ML SOLUTION: Performed by: INTERNAL MEDICINE

## 2019-04-14 PROCEDURE — 85027 COMPLETE CBC AUTOMATED: CPT | Performed by: INTERNAL MEDICINE

## 2019-04-14 PROCEDURE — 25010000002 LORAZEPAM PER 2 MG: Performed by: INTERNAL MEDICINE

## 2019-04-14 PROCEDURE — 82330 ASSAY OF CALCIUM: CPT | Performed by: INTERNAL MEDICINE

## 2019-04-14 RX ORDER — PHENOBARBITAL SODIUM 130 MG/ML
200 INJECTION INTRAMUSCULAR EVERY 4 HOURS PRN
Status: DISCONTINUED | OUTPATIENT
Start: 2019-04-14 | End: 2019-04-16

## 2019-04-14 RX ORDER — FENTANYL CITRATE 50 UG/ML
150 INJECTION, SOLUTION INTRAMUSCULAR; INTRAVENOUS ONCE
Status: DISCONTINUED | OUTPATIENT
Start: 2019-04-14 | End: 2019-04-18

## 2019-04-14 RX ORDER — LORAZEPAM 2 MG/ML
2 INJECTION INTRAMUSCULAR
Status: DISCONTINUED | OUTPATIENT
Start: 2019-04-14 | End: 2019-04-14

## 2019-04-14 RX ORDER — FAMOTIDINE 10 MG/ML
20 INJECTION, SOLUTION INTRAVENOUS EVERY 12 HOURS SCHEDULED
Status: DISCONTINUED | OUTPATIENT
Start: 2019-04-14 | End: 2019-04-18

## 2019-04-14 RX ORDER — CHLORHEXIDINE GLUCONATE 0.12 MG/ML
15 RINSE ORAL EVERY 12 HOURS SCHEDULED
Status: DISCONTINUED | OUTPATIENT
Start: 2019-04-14 | End: 2019-04-19 | Stop reason: HOSPADM

## 2019-04-14 RX ORDER — LORAZEPAM 2 MG/ML
2 INJECTION INTRAMUSCULAR
Status: DISCONTINUED | OUTPATIENT
Start: 2019-04-14 | End: 2019-04-16

## 2019-04-14 RX ORDER — PHENOBARBITAL SODIUM 130 MG/ML
260 INJECTION INTRAMUSCULAR ONCE
Status: COMPLETED | OUTPATIENT
Start: 2019-04-14 | End: 2019-04-14

## 2019-04-14 RX ORDER — SODIUM CHLORIDE, SODIUM LACTATE, POTASSIUM CHLORIDE, CALCIUM CHLORIDE 600; 310; 30; 20 MG/100ML; MG/100ML; MG/100ML; MG/100ML
75 INJECTION, SOLUTION INTRAVENOUS CONTINUOUS
Status: DISCONTINUED | OUTPATIENT
Start: 2019-04-14 | End: 2019-04-16

## 2019-04-14 RX ORDER — PHENOBARBITAL SODIUM 130 MG/ML
200 INJECTION INTRAMUSCULAR EVERY 4 HOURS PRN
Status: DISCONTINUED | OUTPATIENT
Start: 2019-04-14 | End: 2019-04-14

## 2019-04-14 RX ORDER — NICOTINE 21 MG/24HR
1 PATCH, TRANSDERMAL 24 HOURS TRANSDERMAL
Status: DISCONTINUED | OUTPATIENT
Start: 2019-04-14 | End: 2019-04-19 | Stop reason: HOSPADM

## 2019-04-14 RX ORDER — FENTANYL CITRATE 50 UG/ML
100 INJECTION, SOLUTION INTRAMUSCULAR; INTRAVENOUS
Status: DISCONTINUED | OUTPATIENT
Start: 2019-04-14 | End: 2019-04-15

## 2019-04-14 RX ORDER — MIDAZOLAM HYDROCHLORIDE 5 MG/ML
10 INJECTION INTRAMUSCULAR; INTRAVENOUS ONCE
Status: DISCONTINUED | OUTPATIENT
Start: 2019-04-14 | End: 2019-04-18

## 2019-04-14 RX ORDER — ALBUTEROL SULFATE 2.5 MG/3ML
2.5 SOLUTION RESPIRATORY (INHALATION) EVERY 4 HOURS PRN
Status: DISCONTINUED | OUTPATIENT
Start: 2019-04-14 | End: 2019-04-18

## 2019-04-14 RX ORDER — SODIUM CHLORIDE 0.9 % (FLUSH) 0.9 %
3-10 SYRINGE (ML) INJECTION AS NEEDED
Status: DISCONTINUED | OUTPATIENT
Start: 2019-04-14 | End: 2019-04-19 | Stop reason: HOSPADM

## 2019-04-14 RX ORDER — ACETAMINOPHEN 325 MG/1
650 TABLET ORAL EVERY 6 HOURS PRN
Status: DISCONTINUED | OUTPATIENT
Start: 2019-04-14 | End: 2019-04-19 | Stop reason: HOSPADM

## 2019-04-14 RX ORDER — SODIUM CHLORIDE 0.9 % (FLUSH) 0.9 %
3 SYRINGE (ML) INJECTION EVERY 12 HOURS SCHEDULED
Status: DISCONTINUED | OUTPATIENT
Start: 2019-04-14 | End: 2019-04-19 | Stop reason: HOSPADM

## 2019-04-14 RX ADMIN — NICOTINE 1 PATCH: 21 PATCH, EXTENDED RELEASE TRANSDERMAL at 01:16

## 2019-04-14 RX ADMIN — FAMOTIDINE 20 MG: 10 INJECTION INTRAVENOUS at 01:16

## 2019-04-14 RX ADMIN — CHLORHEXIDINE GLUCONATE 0.12% ORAL RINSE 15 ML: 1.2 LIQUID ORAL at 21:13

## 2019-04-14 RX ADMIN — SODIUM CHLORIDE, PRESERVATIVE FREE 3 ML: 5 INJECTION INTRAVENOUS at 03:47

## 2019-04-14 RX ADMIN — PROPOFOL 50 MCG/KG/MIN: 10 INJECTION, EMULSION INTRAVENOUS at 15:48

## 2019-04-14 RX ADMIN — SODIUM CHLORIDE, PRESERVATIVE FREE 3 ML: 5 INJECTION INTRAVENOUS at 21:14

## 2019-04-14 RX ADMIN — SODIUM CHLORIDE, POTASSIUM CHLORIDE, SODIUM LACTATE AND CALCIUM CHLORIDE 200 ML/HR: 600; 310; 30; 20 INJECTION, SOLUTION INTRAVENOUS at 21:09

## 2019-04-14 RX ADMIN — PROPOFOL 50 MCG/KG/MIN: 10 INJECTION, EMULSION INTRAVENOUS at 06:42

## 2019-04-14 RX ADMIN — LORAZEPAM 2 MG: 2 INJECTION, SOLUTION INTRAMUSCULAR; INTRAVENOUS at 01:09

## 2019-04-14 RX ADMIN — PROPOFOL 35 MCG/KG/MIN: 10 INJECTION, EMULSION INTRAVENOUS at 11:43

## 2019-04-14 RX ADMIN — PROPOFOL 50 MCG/KG/MIN: 10 INJECTION, EMULSION INTRAVENOUS at 17:58

## 2019-04-14 RX ADMIN — FENTANYL CITRATE 100 MCG: 50 INJECTION, SOLUTION INTRAMUSCULAR; INTRAVENOUS at 03:29

## 2019-04-14 RX ADMIN — FAMOTIDINE 20 MG: 10 INJECTION INTRAVENOUS at 08:43

## 2019-04-14 RX ADMIN — PROPOFOL 50 MCG/KG/MIN: 10 INJECTION, EMULSION INTRAVENOUS at 22:26

## 2019-04-14 RX ADMIN — ENOXAPARIN SODIUM 40 MG: 40 INJECTION SUBCUTANEOUS at 03:28

## 2019-04-14 RX ADMIN — SODIUM CHLORIDE, POTASSIUM CHLORIDE, SODIUM LACTATE AND CALCIUM CHLORIDE 200 ML/HR: 600; 310; 30; 20 INJECTION, SOLUTION INTRAVENOUS at 10:57

## 2019-04-14 RX ADMIN — PROPOFOL 50 MCG/KG/MIN: 10 INJECTION, EMULSION INTRAVENOUS at 03:28

## 2019-04-14 RX ADMIN — LORAZEPAM 2 MG: 2 INJECTION, SOLUTION INTRAMUSCULAR; INTRAVENOUS at 05:12

## 2019-04-14 RX ADMIN — MIDAZOLAM 1 MG/HR: 5 INJECTION INTRAMUSCULAR; INTRAVENOUS at 06:26

## 2019-04-14 RX ADMIN — LORAZEPAM 2 MG: 2 INJECTION, SOLUTION INTRAMUSCULAR; INTRAVENOUS at 03:43

## 2019-04-14 RX ADMIN — SODIUM CHLORIDE, POTASSIUM CHLORIDE, SODIUM LACTATE AND CALCIUM CHLORIDE 200 ML/HR: 600; 310; 30; 20 INJECTION, SOLUTION INTRAVENOUS at 06:32

## 2019-04-14 RX ADMIN — PROPOFOL 50 MCG/KG/MIN: 10 INJECTION, EMULSION INTRAVENOUS at 01:08

## 2019-04-14 RX ADMIN — PHENOBARBITAL SODIUM 260 MG: 130 INJECTION INTRAMUSCULAR; INTRAVENOUS at 00:41

## 2019-04-14 RX ADMIN — FENTANYL CITRATE 100 MCG: 50 INJECTION, SOLUTION INTRAMUSCULAR; INTRAVENOUS at 05:11

## 2019-04-14 RX ADMIN — FENTANYL CITRATE 100 MCG: 50 INJECTION, SOLUTION INTRAMUSCULAR; INTRAVENOUS at 00:37

## 2019-04-14 RX ADMIN — FAMOTIDINE 20 MG: 10 INJECTION INTRAVENOUS at 21:13

## 2019-04-14 RX ADMIN — SODIUM CHLORIDE, POTASSIUM CHLORIDE, SODIUM LACTATE AND CALCIUM CHLORIDE 200 ML/HR: 600; 310; 30; 20 INJECTION, SOLUTION INTRAVENOUS at 15:59

## 2019-04-14 RX ADMIN — MIDAZOLAM 10 MG/HR: 5 INJECTION INTRAMUSCULAR; INTRAVENOUS at 22:26

## 2019-04-14 RX ADMIN — SODIUM CHLORIDE, POTASSIUM CHLORIDE, SODIUM LACTATE AND CALCIUM CHLORIDE 200 ML/HR: 600; 310; 30; 20 INJECTION, SOLUTION INTRAVENOUS at 01:16

## 2019-04-14 NOTE — PROGRESS NOTES
[facility] HOSPITALIST PROGRESS NOTE     Patient Identification:  Name:  Marko Walters  Age:  43 y.o.  Sex:  male  :  1975  MRN:  [id number]  Visit Number:  [id number]  Primary Care Provider:  Coty Boone APRN    Length of stay:  1    Chief complaint: Sedated    Subjective: Seen and evaluated with the nursing staff.  Sedated and on ventilator.  ----------------------------------------------------------------------------------------------------------------------  Current Hospital Meds:    chlorhexidine 15 mL Mouth/Throat Q12H   enoxaparin 40 mg Subcutaneous Q24H   famotidine 20 mg Intravenous Q12H   fentaNYL citrate (PF) 150 mcg Intravenous Once   midazolam 10 mg Intravenous Once   nicotine 1 patch Transdermal Q24H   sodium chloride 3 mL Intravenous Q12H       lactated ringers 200 mL/hr Last Rate: 200 mL/hr (19 1057)   midazolam 1-10 mg/hr Last Rate: Stopped (19 1000)   propofol 5-50 mcg/kg/min Last Rate: 40 mcg/kg/min (19 1100)     ----------------------------------------------------------------------------------------------------------------------  Vital Signs:  Temp:  [97.9 °F (36.6 °C)-98.9 °F (37.2 °C)] 98.9 °F (37.2 °C)  Heart Rate:  [] 85  Resp:  [17-27] 20  BP: ()/(55-94) 141/93  FiO2 (%):  [30 %-100 %] 30 %      19  2140 19  0042 19  0456   Weight: 86.3 kg (190 lb 4.1 oz) 86.3 kg (190 lb 4.1 oz) 86.3 kg (190 lb 4.1 oz)     Body mass index is 27.3 kg/m².    Intake/Output Summary (Last 24 hours) at 2019 1114  Last data filed at 2019 1056  Gross per 24 hour   Intake 3268 ml   Output 625 ml   Net 2643 ml     NPO Diet  ----------------------------------------------------------------------------------------------------------------------  Physical exam:  Constitutional:  Well-developed and well-nourished.On vent.  No respiratory distress.      HENT:  Head:  Normocephalic and atraumatic.  Mouth:  ET/OG  tube.    Eyes:   Pupils are equal, round, and reactive to light.  No scleral icterus.    Neck:  Neck supple.  No JVD present.    Cardiovascular:  Normal rate, regular rhythm and normal heart sounds with no murmur.  Pulmonary/Chest:  No respiratory distress, no wheezes, no crackles, with normal breath sounds and good air movement.  Abdominal:  Soft.  Bowel sounds are normal.  No distension and no tenderness.   Musculoskeletal:  No edema, no tenderness, and no deformity.  No red or swollen joints anywhere.    Neurological:  Sedateed. Withdraws leg to stimulus   Skin:  Skin is warm and dry. No rash noted. No pallor.   Peripheral vascular:  Strong pulses in all 4 extremities with no clubbing, no cyanosis, no edema.  Genitourinary:Rodriguez  ----------------------------------------------------------------------------------------------------------------------  Tele:    ----------------------------------------------------------------------------------------------------------------------  Results from last 7 days   Lab Units 04/13/19  1847   CK TOTAL U/L 1,444*   TROPONIN T ng/mL 0.016     Results from last 7 days   Lab Units 04/14/19  0348 04/13/19  2333 04/13/19  1847   LACTATE mmol/L  --  1.0  --    WBC 10*3/mm3 12.99*  --  18.15*   HEMOGLOBIN g/dL 14.1  --  16.5   HEMATOCRIT % 41.4  --  47.3   MCV fL 92.0  --  90.1   MCHC g/dL 34.1  --  34.9   PLATELETS 10*3/mm3 237  --  345     Results from last 7 days   Lab Units 04/14/19  0348   PH, ARTERIAL pH units 7.319*   PO2 ART mm Hg 128.0*   PCO2, ARTERIAL mm Hg 45.0   HCO3 ART mmol/L 23.1     Results from last 7 days   Lab Units 04/14/19 0348 04/13/19  1847   SODIUM mmol/L 144 148*   POTASSIUM mmol/L 4.3 3.8   MAGNESIUM mg/dL 2.5 2.7*   CHLORIDE mmol/L 110* 104   CO2 mmol/L 20.0* 26.0   BUN mg/dL 23* 27*   CREATININE mg/dL 0.78 1.54*   EGFR IF NONAFRICN AM mL/min/1.73 110 50*   CALCIUM mg/dL 8.2* 9.9   GLUCOSE mg/dL 93 49*   ALBUMIN g/dL  --  4.70   BILIRUBIN mg/dL  --  1.2    ALK PHOS U/L  --  86   AST (SGOT) U/L  --  67*   ALT (SGPT) U/L  --  46*   Estimated Creatinine Clearance: 152.1 mL/min (by C-G formula based on SCr of 0.78 mg/dL).    No results found for: AMMONIA              I have personally looked at the labs and they are summarized above.  ----------------------------------------------------------------------------------------------------------------------  Imaging Results (last 24 hours)     Procedure Component Value Units Date/Time    XR Chest 1 View [[id number]] Collected:  04/14/19 0539     Updated:  04/14/19 0558    Narrative:         Chest single view on  4/14/2019     CLINICAL INDICATION: ET tube placement    COMPARISON: 4/13/2019    FINDINGS: ET tube tip is in the mid to upper thoracic trachea  approximately 5.1 cm above the level of the lynette. NG tube  extends below the diaphragm and below the level of this film.  Multiple overlying wires are noted. Heart is upper limits normal  for size. There is mild left lower lung atelectasis. Lungs are  otherwise clear. Pulmonary vascularity is within normal limits.      Impression:       New NG tube extends below the diaphragm with  otherwise no significant change.    Electronically signed by:  Mike Ennis  4/14/2019 5:57 AM  CDT Workstation: [id number]    CT Head Without Contrast [[id number]] Collected:  04/13/19 1943     Updated:  04/13/19 2014    Narrative:       PROCEDURE: CT HEAD WO CONTRAST    INDICATION:  Altered mental status    COMPARISON:  5/22/2018    TECHNIQUE:  CT head, without iv contrast.      This exam was performed according to our departmental  dose-optimization program, which includes automated exposure  control, adjustment of the mA and/or kV according to patient size  and/or use of iterative reconstruction technique.  DLP is 955.3     FINDINGS:    Patient is intubated.    The cerebral parenchyma is within normal limits for age.    There is preservation of the gray-white matter differentiation.       No focal parenchymal lesions.    There is no evidence of a hemorrhage or intracranial mass.    There is no midline shift.    The ventricles are normal in size and configuration.    The brain stem, cerebellum, globes,  and osseous structures are  within normal limits.  Mucosal thickening in the ethmoid sinuses. There is an air-fluid  level in left maxillary sinus.  Severe circumferential mucosal thickening is present in the  leftward sphenoid sinus. Mastoids are well aerated and clear.      Impression:       No acute intracranial abnormality. Sinusitis as  above    If pain or symptoms persist beyond reasonable expectations, an  MRI examination is suggested as is deemed clinically appropriate.    Electronically signed by:  Allison Morelos MD  4/13/2019 8:12 PM CDT  Workstation: [id number]    XR Chest 1 View [[id number]] Collected:  04/13/19 1840     Updated:  04/13/19 1858    Narrative:       PROCEDURE: XR CHEST 1 VW    VIEWS:Single    INDICATION: Intubation    COMPARISON: CXR: 11/11/2018    FINDINGS:       - lines/tubes: Endotracheal tube is present with tip  approximately 1 cm cranial to the superior margin of the  clavicular heads    - cardiac: Borderline cardiomegaly    - mediastinum: Contour within normal limits.     - lungs: No evidence of a focal air space process, pulmonary  interstitial edema, nodule(s)/mass.     - pleura: No evidence of  fluid.      - osseous: Unremarkable for age.      Impression:         1. Endotracheal tube tip terminates approximately 1 cm superior  to the clavicular heads  2. Borderline cardiomegaly    Electronically signed by:  Allison Morelos MD  4/13/2019 6:57 PM CDT  Workstation: [id number]        ----------------------------------------------------------------------------------------------------------------------  Assessment:  Acute drug intoxication with methamphetamine.  Acute respiratory failure  Acute kidney injury  Elevated CK  History of tobacco  abuse  Leukocytosis  Hypernatremia.  Abnormal LFTs.    PLAN:  Continue with sedation propofol and Versed.   Continue with current vent setting will begin weaning trial in a.m.  Continue on IV fluid  Famotidine for GI prophylaxis  Lovenox for DVT prophylaxis  Plan of care was discussed with the nursing staff.    Sohail Call MD  04/14/19  11:14 AM

## 2019-04-14 NOTE — H&P
[facility] Medicine Admission      Date of Admission: 4/13/2019      Primary Care Physician: Coty Boone APRN      Chief Complaint: agitation brought in by EMS from a party     HPI: 43 WM with pmh of Meth and opiate abuse or use and is brought in by EMS for agitation after friends at a party called EMS .  EMS had to give paralytics and Intubate him in the field.      He was positive for Meth .  Has some TERRENCE on labs and sodium and wbc are high .   Was started on propofol drip and sent to CCU on vent.      No family present and no other history available.  We know he smokes also.     He is admitted to hospitalist service now .       Concurrent Medical History:  has a past medical history of Hypertension.    Past Surgical History:  has no past surgical history on file.    Family History: family history is not on file.     Social History:  reports that he has been smoking cigarettes.  He has been smoking about 1.00 pack per day. His smokeless tobacco use includes snuff. He reports that he does not drink alcohol.    Allergies:   Allergies   Allergen Reactions   • Penicillins Rash       Medications:   Prior to Admission medications    Medication Sig Start Date End Date Taking? Authorizing Provider   lisinopril (PRINIVIL,ZESTRIL) 20 MG tablet Take 20 mg by mouth Daily.    Emergency, Nurse Shayla, RN   tamsulosin (FLOMAX) 0.4 MG capsule 24 hr capsule Take 1 capsule by mouth Daily. 9/4/18   Manav Llanos PA-C       Review of Systems:  Review of Systems   Unable to perform ROS: Intubated   All other systems reviewed and are negative.     Otherwise complete ROS is negative except as mentioned above.    Physical Exam:   Temp:  [97.9 °F (36.6 °C)-98.9 °F (37.2 °C)] 98.9 °F (37.2 °C)  Heart Rate:  [] 96  Resp:  [17-27] 18  BP: ()/(55-94) 137/88  FiO2 (%):  [35 %-100 %] 35 %  Physical Exam   Constitutional: He appears well-nourished.   HENT:   Head:  Atraumatic.   Eyes: EOM are normal.   Neck: Neck supple.   Cardiovascular: Normal rate, regular rhythm and normal heart sounds.   Pulmonary/Chest: Effort normal and breath sounds normal.   Abdominal: Soft. Bowel sounds are normal.   Musculoskeletal: Normal range of motion.   Skin: Skin is warm. Capillary refill takes less than 2 seconds.   Nursing note and vitals reviewed.        Results Reviewed:  I have personally reviewed current lab, radiology, and data and agree with results.  Lab Results (last 24 hours)     Procedure Component Value Units Date/Time    Lactic Acid, Plasma [[id number]]  (Normal) Collected:  04/13/19 2333    Specimen:  Blood Updated:  04/14/19 0015     Lactate 1.0 mmol/L     Blood Culture - Blood, Arm, Left [[id number]] Collected:  04/13/19 2333    Specimen:  Blood from Arm, Left Updated:  04/13/19 2353    Blood Culture - Blood, Arm, Left [[id number]] Collected:  04/13/19 2333    Specimen:  Blood from Arm, Left Updated:  04/13/19 2353    Blood Gas, Arterial [[id number]]  (Abnormal) Collected:  04/13/19 2159    Specimen:  Arterial Blood Updated:  04/13/19 2206     Site Left Radial     Raj's Test N/A     pH, Arterial 7.298 pH units      Comment: 84 Value below reference range        pCO2, Arterial 49.7 mm Hg      Comment: 83 Value above reference range        pO2, Arterial 146.0 mm Hg      Comment: 83 Value above reference range        HCO3, Arterial 24.3 mmol/L      Base Excess, Arterial -2.7 mmol/L      Comment: 84 Value below reference range        O2 Saturation, Arterial 98.9 %      Barometric Pressure for Blood Gas 742 mmHg      Modality Ventilator     FIO2 50 %      Ventilator Mode NA     Set Tidal Volume 500     Set Mech Resp Rate 14.0     PEEP 5.0     PIP 13 cmH2O      Comment: Meter: [id number]     :  [id number]        Collected by NC    POC Glucose Once [[id number]]  (Normal) Collected:  04/13/19 2015    Specimen:  Blood Updated:  04/13/19 2026     Glucose 87 mg/dL      Comment:  : [id number] KADE Urrutia ID: [id number]       Bruce Draw [[id number]] Collected:  04/13/19 1847    Specimen:  Blood Updated:  04/13/19 2000    Narrative:       The following orders were created for panel order Bruce Draw.  Procedure                               Abnormality         Status                     ---------                               -----------         ------                     Light Blue Top[[id number]]                                   Final result               Green Top (Gel)[[id number]]                                  Final result               Lavender Top[[id number]]                                     Final result               Gold Top - SST[[id number]]                                   Final result                 Please view results for these tests on the individual orders.    Light Blue Top [[id number]] Collected:  04/13/19 1847    Specimen:  Blood Updated:  04/13/19 2000     Extra Tube hold for add-on     Comment: Auto resulted       Green Top (Gel) [[id number]] Collected:  04/13/19 1847    Specimen:  Blood Updated:  04/13/19 2000     Extra Tube Hold for add-ons.     Comment: Auto resulted.       Lavender Top [[id number]] Collected:  04/13/19 1847    Specimen:  Blood Updated:  04/13/19 2000     Extra Tube hold for add-on     Comment: Auto resulted       Gold Top - SST [[id number]] Collected:  04/13/19 1847    Specimen:  Blood Updated:  04/13/19 2000     Extra Tube Hold for add-ons.     Comment: Auto resulted.       Urinalysis, Microscopic Only - Urine, Catheter [[id number]]  (Abnormal) Collected:  04/13/19 1858    Specimen:  Urine, Catheter Updated:  04/13/19 1928     RBC, UA 13-20 /HPF      WBC, UA 0-2 /HPF      Bacteria, UA None Seen /HPF      Squamous Epithelial Cells, UA None Seen /HPF      Hyaline Casts, UA None Seen /LPF      Sperm, UA Large/3+ /HPF      Methodology Manual Light Microscopy    Urine Drug Screen - Urine, Clean Catch [[id number]]  (Abnormal) Collected:   04/13/19 1858    Specimen:  Urine, Clean Catch Updated:  04/13/19 1924     Amphet/Methamphet, Screen Positive     Barbiturates Screen, Urine Negative     Benzodiazepine Screen, Urine Negative     Cocaine Screen, Urine Negative     Opiate Screen Positive     THC, Screen, Urine Negative     Methadone Screen, Urine Negative     Oxycodone Screen, Urine Negative    Narrative:       Negative Thresholds For Drugs Screened:     Amphetamines               500 ng/ml   Barbiturates               200 ng/ml   Benzodiazepines            100 ng/ml   Cocaine                    300 ng/ml   Methadone                  300 ng/ml   Opiates                    300 ng/ml   Oxycodone                  100 ng/ml   THC                        50 ng/ml    The Normal Value for all drugs tested is negative. This report includes final unconfirmed screening results to be used for medical treatment purposes only. Unconfirmed results must not be used for non-medical purposes such as employment or legal testing. Clinical consideration should be applied to any drug of abuse test, particulary when unconfirmed results are used.    Comprehensive Metabolic Panel [[id number]]  (Abnormal) Collected:  04/13/19 1847    Specimen:  Blood Updated:  04/13/19 1918     Glucose 49 mg/dL      BUN 27 mg/dL      Creatinine 1.54 mg/dL      Sodium 148 mmol/L      Potassium 3.8 mmol/L      Chloride 104 mmol/L      CO2 26.0 mmol/L      Calcium 9.9 mg/dL      Total Protein 8.2 g/dL      Albumin 4.70 g/dL      ALT (SGPT) 46 U/L      AST (SGOT) 67 U/L      Alkaline Phosphatase 86 U/L      Total Bilirubin 1.2 mg/dL      eGFR Non African Amer 50 mL/min/1.73      Globulin 3.5 gm/dL      A/G Ratio 1.3 g/dL      BUN/Creatinine Ratio 17.5     Anion Gap 18.0 mmol/L     Narrative:       GFR Normal >60  Chronic Kidney Disease <60  Kidney Failure <15    Acetaminophen Level [[id number]]  (Abnormal) Collected:  04/13/19 1847    Specimen:  Blood Updated:  04/13/19 1912     Acetaminophen <5.0  mcg/mL     Ethanol [[id number]] Collected:  04/13/19 1847    Specimen:  Blood Updated:  04/13/19 1912     Ethanol <10 mg/dL      Ethanol % <0.010 %     Salicylate Level [[id number]]  (Normal) Collected:  04/13/19 1847    Specimen:  Blood Updated:  04/13/19 1912     Salicylate <0.3 mg/dL     Urinalysis With Microscopic If Indicated (No Culture) - Urine, Catheter [[id number]]  (Abnormal) Collected:  04/13/19 1858    Specimen:  Urine, Catheter Updated:  04/13/19 1912     Color, UA Dark Yellow     Appearance, UA Cloudy     pH, UA 6.0     Specific Gravity, UA 1.032     Comment: Result obtained by Refractometer        Glucose, UA Negative     Ketones, UA Trace     Bilirubin, UA Small (1+)     Blood, UA Small (1+)     Protein,  mg/dL (2+)     Leuk Esterase, UA Negative     Nitrite, UA Negative     Urobilinogen, UA 1.0 E.U./dL    CK [[id number]]  (Abnormal) Collected:  04/13/19 1847    Specimen:  Blood Updated:  04/13/19 1912     Creatine Kinase 1,444 U/L     Magnesium [[id number]]  (Abnormal) Collected:  04/13/19 1847    Specimen:  Blood Updated:  04/13/19 1912     Magnesium 2.7 mg/dL     Troponin [[id number]]  (Normal) Collected:  04/13/19 1847    Specimen:  Blood Updated:  04/13/19 1912     Troponin T 0.016 ng/mL     Narrative:       Troponin T Reference Range:  <= 0.03 ng/mL-   Negative for AMI  >0.03 ng/mL-     Abnormal for myocardial necrosis.  Clinicians would have to utilize clinical acumen, EKG, Troponin and serial changes to determine if it is an Acute Myocardial Infarction or myocardial injury due to an underlying chronic condition.     BNP [[id number]]  (Normal) Collected:  04/13/19 1847    Specimen:  Blood Updated:  04/13/19 1909     proBNP 245.6 pg/mL     Narrative:       Among patients with dyspnea, NT-proBNP is highly sensitive for the detection of acute congestive heart failure. In addition NT-proBNP of <300 pg/ml effectively rules out acute congestive heart failure with 99% negative predictive value.    CBC  & Differential [[id number]] Collected:  04/13/19 1847    Specimen:  Blood Updated:  04/13/19 1853    Narrative:       The following orders were created for panel order CBC & Differential.  Procedure                               Abnormality         Status                     ---------                               -----------         ------                     CBC Auto Differential[[id number]]        Abnormal            Final result                 Please view results for these tests on the individual orders.    CBC Auto Differential [[id number]]  (Abnormal) Collected:  04/13/19 1847    Specimen:  Blood Updated:  04/13/19 1853     WBC 18.15 10*3/mm3      RBC 5.25 10*6/mm3      Hemoglobin 16.5 g/dL      Hematocrit 47.3 %      MCV 90.1 fL      MCH 31.4 pg      MCHC 34.9 g/dL      RDW 12.4 %      RDW-SD 40.3 fl      MPV 9.5 fL      Platelets 345 10*3/mm3      Neutrophil % 84.1 %      Lymphocyte % 8.5 %      Monocyte % 6.4 %      Eosinophil % 0.1 %      Basophil % 0.3 %      Immature Grans % 0.6 %      Neutrophils, Absolute 15.28 10*3/mm3      Lymphocytes, Absolute 1.55 10*3/mm3      Monocytes, Absolute 1.16 10*3/mm3      Eosinophils, Absolute 0.01 10*3/mm3      Basophils, Absolute 0.05 10*3/mm3      Immature Grans, Absolute 0.10 10*3/mm3      nRBC 0.0 /100 WBC         Imaging Results (last 24 hours)     Procedure Component Value Units Date/Time    CT Head Without Contrast [[id number]] Collected:  04/13/19 1943     Updated:  04/13/19 2014    Narrative:       PROCEDURE: CT HEAD WO CONTRAST    INDICATION:  Altered mental status    COMPARISON:  5/22/2018    TECHNIQUE:  CT head, without iv contrast.      This exam was performed according to our departmental  dose-optimization program, which includes automated exposure  control, adjustment of the mA and/or kV according to patient size  and/or use of iterative reconstruction technique.  DLP is 955.3     FINDINGS:    Patient is intubated.    The cerebral parenchyma is within  normal limits for age.    There is preservation of the gray-white matter differentiation.      No focal parenchymal lesions.    There is no evidence of a hemorrhage or intracranial mass.    There is no midline shift.    The ventricles are normal in size and configuration.    The brain stem, cerebellum, globes,  and osseous structures are  within normal limits.  Mucosal thickening in the ethmoid sinuses. There is an air-fluid  level in left maxillary sinus.  Severe circumferential mucosal thickening is present in the  leftward sphenoid sinus. Mastoids are well aerated and clear.      Impression:       No acute intracranial abnormality. Sinusitis as  above    If pain or symptoms persist beyond reasonable expectations, an  MRI examination is suggested as is deemed clinically appropriate.    Electronically signed by:  Allison Morelos MD  4/13/2019 8:12 PM CDT  Workstation: [id number]    XR Chest 1 View [[id number]] Collected:  04/13/19 1840     Updated:  04/13/19 1858    Narrative:       PROCEDURE: XR CHEST 1 VW    VIEWS:Single    INDICATION: Intubation    COMPARISON: CXR: 11/11/2018    FINDINGS:       - lines/tubes: Endotracheal tube is present with tip  approximately 1 cm cranial to the superior margin of the  clavicular heads    - cardiac: Borderline cardiomegaly    - mediastinum: Contour within normal limits.     - lungs: No evidence of a focal air space process, pulmonary  interstitial edema, nodule(s)/mass.     - pleura: No evidence of  fluid.      - osseous: Unremarkable for age.      Impression:         1. Endotracheal tube tip terminates approximately 1 cm superior  to the clavicular heads  2. Borderline cardiomegaly    Electronically signed by:  Allison Morelos MD  4/13/2019 6:57 PM CDT  Workstation: [id number]            Assessment and Plan     1. Acute drug intoxication with meth amphetamine   2. Respiratory failure from sedation requiring ventilation   2. Smoking       PLAN    1. Acute intoxication     For now on  Propofol but still agitated .     im adding Phenobarbital IV Prn and IV Fentanyl PRN    If pharmacy runs out of phenobarb , im going to have Ativan 2mg IV Q30M available for nurses also       Run  cc hour    Rodriguez cath in place and urine looks very cloudy     Repeat labs tomorrow morning           2. resp failure    Intubated.  Failure from sedation drugs given to him .  Which was secondary to his   Severe agitation .     Posing a threat to himself and others     For now keep on vent until he calms down and drugs wear off         3. TERRENCE    Run  cc hour    Hold home ACE I     Repeat bmp mag morning labs         4. HTN     Hold all home meds         5. Smoking     Nicotine patch         I discussed the patient's findings and my recommendations with:     Andrea Chappell MD

## 2019-04-14 NOTE — ED PROVIDER NOTES
"Subjective   43 years old is brought in the ER by EMS with a chief complaint of altered mental status/confusion which started prior to arrival.  Patient is very agitated/restless and hitting his head and legs all over.  He denies any drug use.  Patient report he has taken only to Neurontin.  Further history is limited because of his altered mentation.        History provided by:  Patient and EMS personnel  History limited by:  Mental status change      Review of Systems   Unable to perform ROS: Mental status change       Past Medical History:   Diagnosis Date   • Hypertension        Allergies   Allergen Reactions   • Penicillins Rash       History reviewed. No pertinent surgical history.    History reviewed. No pertinent family history.    Social History     Socioeconomic History   • Marital status:      Spouse name: Not on file   • Number of children: Not on file   • Years of education: Not on file   • Highest education level: Not on file   Tobacco Use   • Smoking status: Current Every Day Smoker     Packs/day: 1.00     Types: Cigarettes   • Smokeless tobacco: Current User     Types: Snuff   Substance and Sexual Activity   • Alcohol use: No     Alcohol/week: 3.0 oz     Types: 5 Cans of beer per week   • Sexual activity: Defer       /64 (BP Location: Left arm, Patient Position: Lying)   Pulse 108   Temp 97.9 °F (36.6 °C) (Axillary)   Resp 20   Ht 172.7 cm (68\")   Wt 91.4 kg (201 lb 6.4 oz)   SpO2 99%   BMI 30.62 kg/m²     Objective   Physical Exam   HENT:   Head: Normocephalic and atraumatic.   Nose: Nose normal.   Mouth/Throat: Oropharynx is clear and moist.   Eyes: EOM are normal.   Bilateral dilated 5 mm pupils reacting to light and accommodation.   Neck: Normal range of motion. Neck supple. No tracheal deviation present.   Cardiovascular: Regular rhythm. Tachycardia present.   Pulmonary/Chest: Effort normal and breath sounds normal. He has no wheezes.   Abdominal: Soft. There is no " tenderness.   Musculoskeletal: He exhibits no deformity.   Neurological: He is alert. He displays normal reflexes. No sensory deficit. He exhibits normal muscle tone.   Skin: Skin is warm. He is diaphoretic.   Psychiatric: His speech is rapid and/or pressured. He is agitated. Cognition and memory are impaired. He expresses inappropriate judgment.   Nursing note and vitals reviewed.      Intubation  Date/Time: 4/13/2019 6:45 PM  Performed by: Lobo Lieberman MD  Authorized by: Lobo Lieberman MD     Consent:     Consent obtained:  Emergent situation  Pre-procedure details:     Patient status:  Altered mental status    Pretreatment medications:  Midazolam    Paralytics:  Succinylcholine  Procedure details:     Preoxygenation:  Bag valve mask    CPR in progress: no      Intubation method:  Oral    Laryngoscope blade:  Mac 4    Tube size (mm):  7.5    Tube type:  Cuffed    Number of attempts:  1    Cricoid pressure: no      Tube visualized through cords: yes    Placement assessment:     ETT to lip:  22    Tube secured with:  Adhesive tape and ETT rodríguez    Breath sounds:  Equal    Placement verification: chest rise, condensation, CXR verification, direct visualization, equal breath sounds and tube exhalation      CXR findings:  ETT in proper place  Post-procedure details:     Patient tolerance of procedure:  Tolerated well, no immediate complications               ED Course      Labs Reviewed   COMPREHENSIVE METABOLIC PANEL - Abnormal; Notable for the following components:       Result Value    Glucose 49 (*)     BUN 27 (*)     Creatinine 1.54 (*)     Sodium 148 (*)     ALT (SGPT) 46 (*)     AST (SGOT) 67 (*)     eGFR Non  Amer 50 (*)     All other components within normal limits    Narrative:     GFR Normal >60  Chronic Kidney Disease <60  Kidney Failure <15   ACETAMINOPHEN LEVEL - Abnormal; Notable for the following components:    Acetaminophen <5.0 (*)     All other components within normal limits   URINE DRUG  SCREEN - Abnormal; Notable for the following components:    Amphet/Methamphet, Screen Positive (*)     Opiate Screen Positive (*)     All other components within normal limits    Narrative:     Negative Thresholds For Drugs Screened:     Amphetamines               500 ng/ml   Barbiturates               200 ng/ml   Benzodiazepines            100 ng/ml   Cocaine                    300 ng/ml   Methadone                  300 ng/ml   Opiates                    300 ng/ml   Oxycodone                  100 ng/ml   THC                        50 ng/ml    The Normal Value for all drugs tested is negative. This report includes final unconfirmed screening results to be used for medical treatment purposes only. Unconfirmed results must not be used for non-medical purposes such as employment or legal testing. Clinical consideration should be applied to any drug of abuse test, particulary when unconfirmed results are used.   URINALYSIS W/ MICROSCOPIC IF INDICATED (NO CULTURE) - Abnormal; Notable for the following components:    Appearance, UA Cloudy (*)     Specific Gravity, UA 1.032 (*)     Ketones, UA Trace (*)     Bilirubin, UA Small (1+) (*)     Blood, UA Small (1+) (*)     Protein,  mg/dL (2+) (*)     All other components within normal limits   CK - Abnormal; Notable for the following components:    Creatine Kinase 1,444 (*)     All other components within normal limits   MAGNESIUM - Abnormal; Notable for the following components:    Magnesium 2.7 (*)     All other components within normal limits   CBC WITH AUTO DIFFERENTIAL - Abnormal; Notable for the following components:    WBC 18.15 (*)     Neutrophil % 84.1 (*)     Lymphocyte % 8.5 (*)     Eosinophil % 0.1 (*)     Immature Grans % 0.6 (*)     Neutrophils, Absolute 15.28 (*)     Monocytes, Absolute 1.16 (*)     Immature Grans, Absolute 0.10 (*)     All other components within normal limits   URINALYSIS, MICROSCOPIC ONLY - Abnormal; Notable for the following  components:    RBC, UA 13-20 (*)     Sperm, UA Large/3+ (*)     All other components within normal limits   SALICYLATE LEVEL - Normal   BNP (IN-HOUSE) - Normal    Narrative:     Among patients with dyspnea, NT-proBNP is highly sensitive for the detection of acute congestive heart failure. In addition NT-proBNP of <300 pg/ml effectively rules out acute congestive heart failure with 99% negative predictive value.   TROPONIN (IN-HOUSE) - Normal    Narrative:     Troponin T Reference Range:  <= 0.03 ng/mL-   Negative for AMI  >0.03 ng/mL-     Abnormal for myocardial necrosis.  Clinicians would have to utilize clinical acumen, EKG, Troponin and serial changes to determine if it is an Acute Myocardial Infarction or myocardial injury due to an underlying chronic condition.    POCT GLUCOSE FINGERSTICK - Normal   BLOOD CULTURE   BLOOD CULTURE   RAINBOW DRAW    Narrative:     The following orders were created for panel order [facility] Draw.  Procedure                               Abnormality         Status                     ---------                               -----------         ------                     Light Blue Top[[id number]]                                   Final result               Green Top (Gel)[[id number]]                                  Final result               Lavender Top[[id number]]                                     Final result               Gold Top - SST[[id number]]                                   Final result                 Please view results for these tests on the individual orders.   ETHANOL   BLOOD GAS, ARTERIAL   LACTIC ACID, PLASMA   CBC AND DIFFERENTIAL    Narrative:     The following orders were created for panel order CBC & Differential.  Procedure                               Abnormality         Status                     ---------                               -----------         ------                     CBC Auto Differential[[id number]]        Abnormal            Final result                  Please view results for these tests on the individual orders.   LIGHT BLUE TOP   GREEN TOP   LAVENDER TOP   GOLD TOP - SST       CT Head Without Contrast   Final Result   No acute intracranial abnormality. Sinusitis as   above      If pain or symptoms persist beyond reasonable expectations, an   MRI examination is suggested as is deemed clinically appropriate.      Electronically signed by:  Allison Morelos MD  4/13/2019 8:12 PM CDT   Workstation: [id number]      XR Chest 1 View   Final Result      1. Endotracheal tube tip terminates approximately 1 cm superior   to the clavicular heads   2. Borderline cardiomegaly      Electronically signed by:  Allison Morelos MD  4/13/2019 6:57 PM CDT   Workstation: [id number]      Dr. Chappell accepted patient.            MDM  Number of Diagnoses or Management Options  Diagnosis management comments:  Patient is intubated.  Workup is pending.  Care is transferred to Dr. Grimm at the end of my shift.        Final diagnoses:   Accidental drug overdose, initial encounter   Altered mental status, unspecified altered mental status type            Catarino Grimm MD  04/13/19 [id number]

## 2019-04-14 NOTE — SIGNIFICANT NOTE
Attempted ABG, Patient started coughing and trying to sit up in the bed. MD aware that RT was unable to get ABG at the 1 hr sharon also MD wants tube adjusted once patient is more sedated.

## 2019-04-14 NOTE — PLAN OF CARE
Problem: Patient Care Overview  Goal: Plan of Care Review  Outcome: Ongoing (interventions implemented as appropriate)   04/14/19 1701   OTHER   Outcome Summary pt still intubated and sedated with Propofol and Versed, bite block in place, V/S stable, urine dark and cloudy   Coping/Psychosocial   Plan of Care Reviewed With patient   Plan of Care Review   Progress no change       Problem: Ventilation, Mechanical Invasive (Adult)  Goal: Signs and Symptoms of Listed Potential Problems Will be Absent, Minimized or Managed (Ventilation, Mechanical Invasive)  Outcome: Ongoing (interventions implemented as appropriate)    Goal: Signs and Symptoms of Listed Potential Problems Will be Absent, Minimized or Managed (Ventilation, Mechanical Invasive)  Outcome: Ongoing (interventions implemented as appropriate)      Problem: Restraint, Nonbehavioral (Nonviolent)  Goal: Rationale and Justification  Outcome: Ongoing (interventions implemented as appropriate)      Problem: Fall Risk (Adult)  Goal: Identify Related Risk Factors and Signs and Symptoms  Outcome: Ongoing (interventions implemented as appropriate)    Goal: Absence of Fall  Outcome: Ongoing (interventions implemented as appropriate)      Problem: Overdose, Ingestion/Inhalants (Adult)  Goal: Signs and Symptoms of Listed Potential Problems Will be Absent, Minimized or Managed (Overdose, Ingestion/Inhalants)  Outcome: Ongoing (interventions implemented as appropriate)      Problem: Skin Injury Risk (Adult)  Goal: Identify Related Risk Factors and Signs and Symptoms  Outcome: Outcome(s) achieved Date Met: 04/14/19    Goal: Skin Health and Integrity  Outcome: Ongoing (interventions implemented as appropriate)

## 2019-04-14 NOTE — PAYOR COMM NOTE
"Marko Lauren (43 y.o. Male)     Date of Birth Social Security Number Address Home Phone MRN    1975  [address] [facility] [phone] [id number]    [place] Marital Status          Episcopalian        Admission Date Admission Type Admitting Provider Attending Provider Department, Room/Bed    4/13/19 Emergency Andrea Chappell MD Shope, Adam M, MD [facility] CRITICAL CARE, 05/A    Discharge Date Discharge Disposition Discharge Destination                       Attending Provider:  Andrea Chappell MD    Allergies:  Penicillins    Isolation:  None   Infection:  None   Code Status:  CPR    Ht:  177.8 cm (70\")   Wt:  86.3 kg (190 lb 4.1 oz)    Admission Cmt:  None   Principal Problem:  None                Active Insurance as of 4/13/2019     Primary Coverage     Payor Plan Insurance Group Employer/Plan Group    [employer] MEDICAID [employer] [id number]     Payor Plan Address Payor Plan Phone Number Payor Plan Fax Number Effective Dates    PO  [phone]  2/20/2017 - None Entered    [facility] [id number]       Subscriber Name Subscriber Birth Date Member ID       MARKO LAUREN 1975 [id number]                 Emergency Contacts      (Rel.) Home Phone Work Phone Mobile Phone    Lianne Lauren (Spouse) [phone] -- [phone]        EFE Aragon  [facility]  [phone]    Phone  [phone]     Fax         History & Physical      Andrea Chappell MD at 4/14/2019 12:46 AM                [facility] Medicine Admission      Date of Admission: 4/13/2019      Primary Care Physician: Coty Boone APRN      Chief Complaint: agitation brought in by EMS from a party     HPI: 43 WM with pmh of Meth and opiate abuse or use and is brought in by EMS for agitation after friends at a party called EMS .  EMS had to give paralytics and Intubate him in the field.      He " was positive for Meth .  Has some TERRENCE on labs and sodium and wbc are high .   Was started on propofol drip and sent to CCU on vent.      No family present and no other history available.  We know he smokes also.     He is admitted to hospitalist service now .       Concurrent Medical History:  has a past medical history of Hypertension.    Past Surgical History:  has no past surgical history on file.    Family History: family history is not on file.     Social History:  reports that he has been smoking cigarettes.  He has been smoking about 1.00 pack per day. His smokeless tobacco use includes snuff. He reports that he does not drink alcohol.    Allergies:   Allergies   Allergen Reactions   • Penicillins Rash       Medications:   Prior to Admission medications    Medication Sig Start Date End Date Taking? Authorizing Provider   lisinopril (PRINIVIL,ZESTRIL) 20 MG tablet Take 20 mg by mouth Daily.    Emergency, Nurse Shayla, RN   tamsulosin (FLOMAX) 0.4 MG capsule 24 hr capsule Take 1 capsule by mouth Daily. 9/4/18   Manav Llanos PA-C       Review of Systems:  Review of Systems   Unable to perform ROS: Intubated   All other systems reviewed and are negative.     Otherwise complete ROS is negative except as mentioned above.    Physical Exam:   Temp:  [97.9 °F (36.6 °C)-98.9 °F (37.2 °C)] 98.9 °F (37.2 °C)  Heart Rate:  [] 96  Resp:  [17-27] 18  BP: ()/(55-94) 137/88  FiO2 (%):  [35 %-100 %] 35 %  Physical Exam   Constitutional: He appears well-nourished.   HENT:   Head: Atraumatic.   Eyes: EOM are normal.   Neck: Neck supple.   Cardiovascular: Normal rate, regular rhythm and normal heart sounds.   Pulmonary/Chest: Effort normal and breath sounds normal.   Abdominal: Soft. Bowel sounds are normal.   Musculoskeletal: Normal range of motion.   Skin: Skin is warm. Capillary refill takes less than 2 seconds.   Nursing note and vitals reviewed.        Results Reviewed:  I have personally reviewed current  lab, radiology, and data and agree with results.  Lab Results (last 24 hours)     Procedure Component Value Units Date/Time    Lactic Acid, Plasma [[id number]]  (Normal) Collected:  04/13/19 2333    Specimen:  Blood Updated:  04/14/19 0015     Lactate 1.0 mmol/L     Blood Culture - Blood, Arm, Left [[id number]] Collected:  04/13/19 2333    Specimen:  Blood from Arm, Left Updated:  04/13/19 2353    Blood Culture - Blood, Arm, Left [[id number]] Collected:  04/13/19 2333    Specimen:  Blood from Arm, Left Updated:  04/13/19 2353    Blood Gas, Arterial [[id number]]  (Abnormal) Collected:  04/13/19 2159    Specimen:  Arterial Blood Updated:  04/13/19 2206     Site Left Radial     Raj's Test N/A     pH, Arterial 7.298 pH units      Comment: 84 Value below reference range        pCO2, Arterial 49.7 mm Hg      Comment: 83 Value above reference range        pO2, Arterial 146.0 mm Hg      Comment: 83 Value above reference range        HCO3, Arterial 24.3 mmol/L      Base Excess, Arterial -2.7 mmol/L      Comment: 84 Value below reference range        O2 Saturation, Arterial 98.9 %      Barometric Pressure for Blood Gas 742 mmHg      Modality Ventilator     FIO2 50 %      Ventilator Mode NA     Set Tidal Volume 500     Set Mech Resp Rate 14.0     PEEP 5.0     PIP 13 cmH2O      Comment: Meter: [id number]     :  [id number]        Collected by NC    POC Glucose Once [[id number]]  (Normal) Collected:  04/13/19 2015    Specimen:  Blood Updated:  04/13/19 2026     Glucose 87 mg/dL      Comment: : [id number] KADE Urrutia ID: [id number]       [place] Draw [[id number]] Collected:  04/13/19 1847    Specimen:  Blood Updated:  04/13/19 2000    Narrative:       The following orders were created for panel order [place] Draw.  Procedure                               Abnormality         Status                     ---------                               -----------         ------                     Light Blue  Top[[id number]]                                   Final result               Green Top (Gel)[[id number]]                                  Final result               Lavender Top[[id number]]                                     Final result               Gold Top - SST[[id number]]                                   Final result                 Please view results for these tests on the individual orders.    Light Blue Top [[id number]] Collected:  04/13/19 1847    Specimen:  Blood Updated:  04/13/19 2000     Extra Tube hold for add-on     Comment: Auto resulted       Green Top (Gel) [[id number]] Collected:  04/13/19 1847    Specimen:  Blood Updated:  04/13/19 2000     Extra Tube Hold for add-ons.     Comment: Auto resulted.       Lavender Top [[id number]] Collected:  04/13/19 1847    Specimen:  Blood Updated:  04/13/19 2000     Extra Tube hold for add-on     Comment: Auto resulted       Gold Top - SST [[id number]] Collected:  04/13/19 1847    Specimen:  Blood Updated:  04/13/19 2000     Extra Tube Hold for add-ons.     Comment: Auto resulted.       Urinalysis, Microscopic Only - Urine, Catheter [[id number]]  (Abnormal) Collected:  04/13/19 1858    Specimen:  Urine, Catheter Updated:  04/13/19 1928     RBC, UA 13-20 /HPF      WBC, UA 0-2 /HPF      Bacteria, UA None Seen /HPF      Squamous Epithelial Cells, UA None Seen /HPF      Hyaline Casts, UA None Seen /LPF      Sperm, UA Large/3+ /HPF      Methodology Manual Light Microscopy    Urine Drug Screen - Urine, Clean Catch [[id number]]  (Abnormal) Collected:  04/13/19 1858    Specimen:  Urine, Clean Catch Updated:  04/13/19 1924     Amphet/Methamphet, Screen Positive     Barbiturates Screen, Urine Negative     Benzodiazepine Screen, Urine Negative     Cocaine Screen, Urine Negative     Opiate Screen Positive     THC, Screen, Urine Negative     Methadone Screen, Urine Negative     Oxycodone Screen, Urine Negative    Narrative:       Negative Thresholds For Drugs Screened:      Amphetamines               500 ng/ml   Barbiturates               200 ng/ml   Benzodiazepines            100 ng/ml   Cocaine                    300 ng/ml   Methadone                  300 ng/ml   Opiates                    300 ng/ml   Oxycodone                  100 ng/ml   THC                        50 ng/ml    The Normal Value for all drugs tested is negative. This report includes final unconfirmed screening results to be used for medical treatment purposes only. Unconfirmed results must not be used for non-medical purposes such as employment or legal testing. Clinical consideration should be applied to any drug of abuse test, particulary when unconfirmed results are used.    Comprehensive Metabolic Panel [[id number]]  (Abnormal) Collected:  04/13/19 1847    Specimen:  Blood Updated:  04/13/19 1918     Glucose 49 mg/dL      BUN 27 mg/dL      Creatinine 1.54 mg/dL      Sodium 148 mmol/L      Potassium 3.8 mmol/L      Chloride 104 mmol/L      CO2 26.0 mmol/L      Calcium 9.9 mg/dL      Total Protein 8.2 g/dL      Albumin 4.70 g/dL      ALT (SGPT) 46 U/L      AST (SGOT) 67 U/L      Alkaline Phosphatase 86 U/L      Total Bilirubin 1.2 mg/dL      eGFR Non African Amer 50 mL/min/1.73      Globulin 3.5 gm/dL      A/G Ratio 1.3 g/dL      BUN/Creatinine Ratio 17.5     Anion Gap 18.0 mmol/L     Narrative:       GFR Normal >60  Chronic Kidney Disease <60  Kidney Failure <15    Acetaminophen Level [[id number]]  (Abnormal) Collected:  04/13/19 1847    Specimen:  Blood Updated:  04/13/19 1912     Acetaminophen <5.0 mcg/mL     Ethanol [[id number]] Collected:  04/13/19 1847    Specimen:  Blood Updated:  04/13/19 1912     Ethanol <10 mg/dL      Ethanol % <0.010 %     Salicylate Level [[id number]]  (Normal) Collected:  04/13/19 1847    Specimen:  Blood Updated:  04/13/19 1912     Salicylate <0.3 mg/dL     Urinalysis With Microscopic If Indicated (No Culture) - Urine, Catheter [[id number]]  (Abnormal) Collected:  04/13/19 1858    Specimen:   Urine, Catheter Updated:  04/13/19 1912     Color, UA Dark Yellow     Appearance, UA Cloudy     pH, UA 6.0     Specific Gravity, UA 1.032     Comment: Result obtained by Refractometer        Glucose, UA Negative     Ketones, UA Trace     Bilirubin, UA Small (1+)     Blood, UA Small (1+)     Protein,  mg/dL (2+)     Leuk Esterase, UA Negative     Nitrite, UA Negative     Urobilinogen, UA 1.0 E.U./dL    CK [[id number]]  (Abnormal) Collected:  04/13/19 1847    Specimen:  Blood Updated:  04/13/19 1912     Creatine Kinase 1,444 U/L     Magnesium [[id number]]  (Abnormal) Collected:  04/13/19 1847    Specimen:  Blood Updated:  04/13/19 1912     Magnesium 2.7 mg/dL     Troponin [[id number]]  (Normal) Collected:  04/13/19 1847    Specimen:  Blood Updated:  04/13/19 1912     Troponin T 0.016 ng/mL     Narrative:       Troponin T Reference Range:  <= 0.03 ng/mL-   Negative for AMI  >0.03 ng/mL-     Abnormal for myocardial necrosis.  Clinicians would have to utilize clinical acumen, EKG, Troponin and serial changes to determine if it is an Acute Myocardial Infarction or myocardial injury due to an underlying chronic condition.     BNP [[id number]]  (Normal) Collected:  04/13/19 1847    Specimen:  Blood Updated:  04/13/19 1909     proBNP 245.6 pg/mL     Narrative:       Among patients with dyspnea, NT-proBNP is highly sensitive for the detection of acute congestive heart failure. In addition NT-proBNP of <300 pg/ml effectively rules out acute congestive heart failure with 99% negative predictive value.    CBC & Differential [[id number]] Collected:  04/13/19 1847    Specimen:  Blood Updated:  04/13/19 1853    Narrative:       The following orders were created for panel order CBC & Differential.  Procedure                               Abnormality         Status                     ---------                               -----------         ------                     CBC Auto Differential[[id number]]        Abnormal             Final result                 Please view results for these tests on the individual orders.    CBC Auto Differential [[id number]]  (Abnormal) Collected:  04/13/19 1847    Specimen:  Blood Updated:  04/13/19 1853     WBC 18.15 10*3/mm3      RBC 5.25 10*6/mm3      Hemoglobin 16.5 g/dL      Hematocrit 47.3 %      MCV 90.1 fL      MCH 31.4 pg      MCHC 34.9 g/dL      RDW 12.4 %      RDW-SD 40.3 fl      MPV 9.5 fL      Platelets 345 10*3/mm3      Neutrophil % 84.1 %      Lymphocyte % 8.5 %      Monocyte % 6.4 %      Eosinophil % 0.1 %      Basophil % 0.3 %      Immature Grans % 0.6 %      Neutrophils, Absolute 15.28 10*3/mm3      Lymphocytes, Absolute 1.55 10*3/mm3      Monocytes, Absolute 1.16 10*3/mm3      Eosinophils, Absolute 0.01 10*3/mm3      Basophils, Absolute 0.05 10*3/mm3      Immature Grans, Absolute 0.10 10*3/mm3      nRBC 0.0 /100 WBC         Imaging Results (last 24 hours)     Procedure Component Value Units Date/Time    CT Head Without Contrast [[id number]] Collected:  04/13/19 1943     Updated:  04/13/19 2014    Narrative:       PROCEDURE: CT HEAD WO CONTRAST    INDICATION:  Altered mental status    COMPARISON:  5/22/2018    TECHNIQUE:  CT head, without iv contrast.      This exam was performed according to our departmental  dose-optimization program, which includes automated exposure  control, adjustment of the mA and/or kV according to patient size  and/or use of iterative reconstruction technique.  DLP is 955.3     FINDINGS:    Patient is intubated.    The cerebral parenchyma is within normal limits for age.    There is preservation of the gray-white matter differentiation.      No focal parenchymal lesions.    There is no evidence of a hemorrhage or intracranial mass.    There is no midline shift.    The ventricles are normal in size and configuration.    The brain stem, cerebellum, globes,  and osseous structures are  within normal limits.  Mucosal thickening in the ethmoid sinuses. There is an  air-fluid  level in left maxillary sinus.  Severe circumferential mucosal thickening is present in the  leftward sphenoid sinus. Mastoids are well aerated and clear.      Impression:       No acute intracranial abnormality. Sinusitis as  above    If pain or symptoms persist beyond reasonable expectations, an  MRI examination is suggested as is deemed clinically appropriate.    Electronically signed by:  Allison Morelos MD  4/13/2019 8:12 PM CDT  Workstation: [id number]    XR Chest 1 View [[id number]] Collected:  04/13/19 1840     Updated:  04/13/19 1858    Narrative:       PROCEDURE: XR CHEST 1 VW    VIEWS:Single    INDICATION: Intubation    COMPARISON: CXR: 11/11/2018    FINDINGS:       - lines/tubes: Endotracheal tube is present with tip  approximately 1 cm cranial to the superior margin of the  clavicular heads    - cardiac: Borderline cardiomegaly    - mediastinum: Contour within normal limits.     - lungs: No evidence of a focal air space process, pulmonary  interstitial edema, nodule(s)/mass.     - pleura: No evidence of  fluid.      - osseous: Unremarkable for age.      Impression:         1. Endotracheal tube tip terminates approximately 1 cm superior  to the clavicular heads  2. Borderline cardiomegaly    Electronically signed by:  Allison Morelos MD  4/13/2019 6:57 PM CDT  Workstation: [id number]            Assessment and Plan     1. Acute drug intoxication with meth amphetamine   2. Respiratory failure from sedation requiring ventilation   2. Smoking       PLAN    1. Acute intoxication     For now on Propofol but still agitated .     im adding Phenobarbital IV Prn and IV Fentanyl PRN    If pharmacy runs out of phenobarb , im going to have Ativan 2mg IV Q30M available for nurses also       Run  cc hour    Rodriguez cath in place and urine looks very cloudy     Repeat labs tomorrow morning           2. resp failure    Intubated.  Failure from sedation drugs given to him .  Which was secondary to his   Severe  agitation .     Posing a threat to himself and others     For now keep on vent until he calms down and drugs wear off         3. TERRENCE    Run  cc hour    Hold home ACE I     Repeat bmp mag morning labs         4. HTN     Hold all home meds         5. Smoking     Nicotine patch         I discussed the patient's findings and my recommendations with:     Andrea Chappell MD                Electronically signed by Andrea Chappell MD at 4/14/2019 12:53 AM       ICU Vital Signs     Row Name 04/14/19 0632 04/14/19 0602 04/14/19 0456 04/14/19 0451 04/14/19 0403       Height and Weight    Weight  --  --  86.3 kg (190 lb 4.1 oz)  --  --    Weight Method  --  --  Bed scale  --  --       Vitals    Core (Body) Temperature  96.3 °F (35.7 °C)  96.3 °F (35.7 °C)  --  --  95.9 °F (35.5 °C)    Pulse  88  87  --  85  86    Heart Rate Source  --  --  --  Monitor  --    Resp  --  --  --  20  --    Resp Rate Source  --  --  --  Ventilator  --    Resp Rate (Observed) Vent  20  20  --  20  18    BP  143/93  138/91  --  --  121/60    Noninvasive MAP (mmHg)  112  108  --  --  86       Oxygen Therapy    SpO2  100 %  100 %  --  100 %  100 %    Pulse Oximetry Type  --  --  --  Continuous  --    Device (Oxygen Therapy)  --  --  --  ventilator  --    Oxygen Concentration (%)  --  --  --  30  --    Row Name 04/14/19 0322 04/14/19 0300 04/14/19 0200 04/14/19 0133 04/14/19 0100       Vitals    Core (Body) Temperature  --  96.1 °F (35.6 °C)  96.6 °F (35.9 °C)  --  --    Pulse  89  90  91  93  109    Heart Rate Source  Monitor  --  --  Monitor  --    Resp  18  --  --  17  --    Resp Rate Source  Ventilator  --  --  Ventilator  --    Resp Rate (Observed) Vent  18  18  18  18  24    BP  --  141/94  --  127/83  --    Noninvasive MAP (mmHg)  --  113  --  95  --       Oxygen Therapy    SpO2  100 %  100 %  100 %  100 %  98 %    Pulse Oximetry Type  Continuous  --  --  Continuous  --    Device (Oxygen Therapy)  ventilator  --  --  ventilator  --    Oxygen  "Concentration (%)  30  --  --  30  --    Row Name 04/14/19 0042 04/14/19 0001 04/13/19 2330 04/13/19 2302 04/13/19 2216       Height and Weight    Weight  86.3 kg (190 lb 4.1 oz)  --  --  --  --       Vitals    Core (Body) Temperature  --  96.3 °F (35.7 °C)  --  96.8 °F (36 °C)  96.8 °F (36 °C)    Pulse  --  99  96  98  104    Heart Rate Source  --  --  Monitor  --  --    Resp  --  --  18  --  --    Resp Rate Source  --  --  Ventilator  --  --    Resp Rate (Observed) Vent  --  19  18  15  17    BP  --  136/88  --  119/69  137/88    Noninvasive MAP (mmHg)  --  107  --  89  108       Oxygen Therapy    SpO2  --  99 %  100 %  100 %  100 %    Pulse Oximetry Type  --  --  Continuous  --  --    Device (Oxygen Therapy)  --  --  ventilator  --  --    Oxygen Concentration (%)  --  --  30  (Significant)   --  --    Row Name 04/13/19 2201 04/13/19 2200 04/13/19 2155 04/13/19 2143 04/13/19 2140       Height and Weight    Height  --  --  --  --  177.8 cm (70\")    Height Method  --  --  --  --  Estimated    Weight  --  --  --  --  86.3 kg (190 lb 4.1 oz)    Weight Method  --  --  --  --  Bed scale    Ideal Body Weight (IBW) (kg)  --  --  --  --  76.48    BSA (Calculated - sq m)  --  --  --  --  2.04 sq meters    BMI (Calculated)  --  --  --  --  27.3    Weight in (lb) to have BMI = 25  --  --  --  --  173.9       Vitals    Temp  --  --  98.9 °F (37.2 °C)  --  98 °F (36.7 °C)    Temp src  --  --  Temporal  --  Temporal    Core (Body) Temperature  97 °F (36.1 °C)  --  97.2 °F (36.2 °C)  93.9 °F (34.4 °C)  --    Pulse  101  104  101  98  --    Heart Rate Source  --  Monitor  --  --  --    Resp  --  17  --  --  --    Resp Rate Source  --  Ventilator  --  --  --    Resp Rate (Observed) Vent  19  17  17  20  --    BP  123/88  --  136/86  126/83  --    Noninvasive MAP (mmHg)  99  --  106  95  --    BP Location  --  --  --  --  Left arm    BP Method  --  --  --  --  Automatic    Patient Position  --  --  --  --  Lying       Oxygen Therapy "    SpO2  100 %  100 %  100 %  100 %  --    Pulse Oximetry Type  --  Continuous  --  --  Continuous    Device (Oxygen Therapy)  --  ventilator  --  --  ventilator    Oxygen Concentration (%)  --  50  --  --  --    [facility] Name 04/13/19 21:19:56 04/13/19 21:05:48 04/13/19 2100 04/13/19 2050 04/13/19 20:45:55       Vitals    Pulse  --  --  108  107  107    Heart Rate Source  --  --  Monitor  Monitor  Monitor    Resp  --  --  20  20  20    Resp Rate Source  --  --  Ventilator  Ventilator  Ventilator    BP  126/71  128/65  131/64  123/68  121/67    BP Location  Left arm  --  Left arm  Left arm  --    BP Method  Automatic  --  Automatic  Automatic  --    Patient Position  Lying  --  Lying  Lying  --       Oxygen Therapy    SpO2  --  --  99 %  99 %  99 %    Device (Oxygen Therapy)  --  --  ventilator  ventilator  ventilator    Row Name 04/13/19 2040 04/13/19 20:35:48 04/13/19 20:27:54 04/13/19 2025 04/13/19 2015       Vitals    Pulse  106  111  109  --  108    Heart Rate Source  Monitor  Monitor  Monitor  --  Monitor    Resp  20  20  20  --  20    Resp Rate Source  Ventilator  Ventilator  Visual  --  --    BP  123/75  119/83  --  98/55  100/56    BP Location  --  Left arm  --  --  --    BP Method  --  Automatic  --  --  --    Patient Position  --  Lying  --  --  --       Oxygen Therapy    SpO2  99 %  100 %  100 %  --  100 %    Device (Oxygen Therapy)  ventilator  ventilator  --  --  --    Row Name 04/13/19 2010 04/13/19 20:01:13 04/13/19 1934 04/13/19 1932 04/13/19 1930       Vitals    Temp  --  97.9 °F (36.6 °C)  --  --  --    Temp src  --  Axillary  --  --  --    Pulse  --  --  120  122  (Abnormal)   --    BP  106/60  101/58  --  --  111/55       Oxygen Therapy    SpO2  --  --  99 %  98 %  --    [facility] Name 04/13/19 1924 04/13/19 1920 04/13/19 1916 04/13/19 1912 04/13/19 1910       Vitals    Pulse  120  --  122  (Abnormal)   122  (Abnormal)   --    BP  --  105/58  --  --  123/63       Oxygen Therapy    SpO2  100 %  --  100 %   "100 %  --    Row Name 04/13/19 1905 04/13/19 1904 04/13/19 1900 04/13/19 1855 04/13/19 18:52:18       Height and Weight    Height  --  --  --  --  172.7 cm (68\")    Height Method  --  --  --  --  Estimated    Weight  --  --  --  --  91.4 kg (201 lb 6.4 oz)    Weight Method  --  --  --  --  Bed scale    Ideal Body Weight (IBW) (kg)  --  --  --  --  70.89    BSA (Calculated - sq m)  --  --  --  --  2.05 sq meters    BMI (Calculated)  --  --  --  --  30.6    Weight in (lb) to have BMI = 25  --  --  --  --  164.1       Vitals    Pulse  --  126  (Abnormal)   118  --  --    BP  134/67  --  --  111/68  --       Oxygen Therapy    SpO2  --  100 %  100 %  --  --    Row Name 04/13/19 18:51:12 04/13/19 1842 04/13/19 1841 04/13/19 1840 04/13/19 1839       Vitals    Pulse  --  134  (Abnormal)   138  (Abnormal)   136  (Abnormal)   130  (Abnormal)     BP  116/70  --  --  --  --    BP Location  Left arm  --  --  --  --    BP Method  Automatic  --  --  --  --    Patient Position  Lying  --  --  --  --       Oxygen Therapy    SpO2  --  100 %  100 %  100 %  99 %    Row Name 04/13/19 1835 04/13/19 1833 04/13/19 1831 04/13/19 1805 04/13/19 1800       Vitals    Pulse  148  (Abnormal)   --  150  (Abnormal)   --  --    Resp  --  27  --  --  --    Resp Rate Source  --  Ventilator  --  --  --    Resp Rate (Observed) Vent  --  27  --  --  --    BP  150/94  --  138/90  134/67  114/66    Noninvasive MAP (mmHg)  117  --  109  --  --       Oxygen Therapy    SpO2  100 %  100 %  --  --  --    Pulse Oximetry Type  --  Continuous  --  --  --    Device (Oxygen Therapy)  --  ventilator  --  --  --    Oxygen Concentration (%)  --  100  --  --  --    Row Name 04/13/19 1755                   Vitals    BP  111/68            Hospital Medications (active)       Dose Frequency Start End    acetaminophen (TYLENOL) tablet 650 mg 650 mg Every 6 Hours PRN 4/14/2019     Sig - Route: Take 2 tablets by mouth Every 6 (Six) Hours As Needed for Mild Pain . - Oral    " albuterol (PROVENTIL) nebulizer solution 0.083% 2.5 mg/3mL 2.5 mg Every 4 Hours PRN 4/14/2019     Sig - Route: Take 2.5 mg by nebulization Every 4 (Four) Hours As Needed for Wheezing. - Nebulization    chlorhexidine (PERIDEX) 0.12 % solution 15 mL 15 mL Every 12 Hours Scheduled 4/14/2019     Sig - Route: Apply 15 mL to the mouth or throat Every 12 (Twelve) Hours. - Mouth/Throat    dextrose (D50W) 25 g/ 50mL Intravenous Solution 50 mL 50 mL Every 1 Hour PRN 4/13/2019     Sig - Route: Infuse 50 mL into a venous catheter Every 1 (One) Hour As Needed for Low Blood Sugar. - Intravenous    enoxaparin (LOVENOX) syringe 40 mg 40 mg Every 24 Hours 4/14/2019     Sig - Route: Inject 0.4 mL under the skin into the appropriate area as directed Daily. - Subcutaneous    etomidate (AMIDATE) injection 20 mg 20 mg Once 4/13/2019 4/13/2019    Sig - Route: Infuse 10 mL into a venous catheter 1 (One) Time. - Intravenous    famotidine (PEPCID) injection 20 mg 20 mg Every 12 Hours Scheduled 4/14/2019     Sig - Route: Infuse 2 mL into a venous catheter Every 12 (Twelve) Hours. - Intravenous    fentaNYL citrate (PF) (SUBLIMAZE) injection 100 mcg 100 mcg Once 4/13/2019 4/13/2019    Sig - Route: Infuse 2 mL into a venous catheter 1 (One) Time. - Intravenous    fentaNYL citrate (PF) (SUBLIMAZE) injection 100 mcg 100 mcg Every 1 Hour PRN 4/14/2019 4/24/2019    Sig - Route: Infuse 2 mL into a venous catheter Every 1 (One) Hour As Needed for Severe Pain  (aggitation). - Intravenous    fentaNYL citrate (PF) (SUBLIMAZE) injection 150 mcg 150 mcg Once 4/14/2019     Sig - Route: Infuse 3 mL into a venous catheter 1 (One) Time. - Intravenous    lactated ringers infusion 200 mL/hr Continuous 4/14/2019     Sig - Route: Infuse 200 mL/hr into a venous catheter Continuous. - Intravenous    LORazepam (ATIVAN) injection 2 mg 2 mg Once 4/13/2019 4/13/2019    Sig - Route: Infuse 1 mL into a venous catheter 1 (One) Time. - Intravenous    LORazepam (ATIVAN)  injection 2 mg 2 mg Once 4/13/2019 4/13/2019    Sig - Route: Infuse 1 mL into a venous catheter 1 (One) Time. - Intravenous    LORazepam (ATIVAN) injection 2 mg 2 mg Every 30 Minutes PRN 4/14/2019 4/24/2019    Sig - Route: Infuse 1 mL into a venous catheter Every 30 (Thirty) Minutes As Needed (for non-severe anxiety/agitation). - Intravenous    midazolam (VERSED) 50 mg in sodium chloride 0.9 % 100 mL (0.5 mg/mL) infusion 1-10 mg/hr Titrated 4/14/2019     Sig - Route: Infuse 1-10 mg/hr into a venous catheter Dose Adjusted By Provider As Needed. - Intravenous    midazolam (VERSED) injection 10 mg 10 mg Once 4/14/2019     Sig - Route: Infuse 2 mL into a venous catheter 1 (One) Time. - Intravenous    midazolam (VERSED) injection 2 mg 2 mg Once 4/13/2019 4/13/2019    Sig - Route: Infuse 2 mL into a venous catheter 1 (One) Time. - Intravenous    midazolam (VERSED) injection 4 mg 4 mg Once 4/13/2019 4/13/2019    Sig - Route: Infuse 4 mL into a venous catheter 1 (One) Time. - Intravenous    nicotine (NICODERM CQ) 21 MG/24HR patch 1 patch 1 patch Every 24 Hours Scheduled 4/14/2019     Sig - Route: Place 1 patch on the skin as directed by provider Daily. - Transdermal    PHENobarbital injection 200 mg 200 mg Every 4 Hours PRN 4/14/2019 5/14/2019    Sig - Route: Infuse 1.54 mL into a venous catheter Every 4 (Four) Hours As Needed (aggression). - Intravenous    PHENobarbital injection 260 mg 260 mg Once 4/13/2019 4/13/2019    Sig - Route: Infuse 2 mL into a venous catheter 1 (One) Time. - Intravenous    PHENobarbital injection 260 mg 260 mg Once 4/14/2019 4/14/2019    Sig - Route: Infuse 2 mL into a venous catheter 1 (One) Time. - Intravenous    propofol (DIPRIVAN) infusion 10 mg/mL 100 mL 5-50 mcg/kg/min × 91.4 kg Titrated 4/13/2019     Sig - Route: Infuse 457-4,570 mcg/min into a venous catheter Dose Adjusted By Provider As Needed. - Intravenous    sodium chloride 0.9 % bolus 1,000 mL 1,000 mL Once 4/13/2019 4/13/2019    Sig  - Route: Infuse 1,000 mL into a venous catheter 1 (One) Time. - Intravenous    sodium chloride 0.9 % flush 10 mL 10 mL As Needed 4/13/2019     Sig - Route: Infuse 10 mL into a venous catheter As Needed for Line Care. - Intravenous    Cosign for Ordering: Required by Lobo Lieberman MD    sodium chloride 0.9 % flush 3 mL 3 mL Every 12 Hours Scheduled 4/14/2019     Sig - Route: Infuse 3 mL into a venous catheter Every 12 (Twelve) Hours. - Intravenous    sodium chloride 0.9 % flush 3-10 mL 3-10 mL As Needed 4/14/2019     Sig - Route: Infuse 3-10 mL into a venous catheter As Needed for Line Care. - Intravenous    succinylcholine (ANECTINE) injection 100 mg 100 mg Once 4/13/2019 4/13/2019    Sig - Route: Infuse 5 mL into a venous catheter 1 (One) Time. - Intravenous    LORazepam (ATIVAN) injection 2 mg (Discontinued) 2 mg Every 30 Minutes PRN 4/14/2019 4/14/2019    Sig - Route: Infuse 1 mL into a venous catheter Every 30 (Thirty) Minutes As Needed for Anxiety or Agitation. - Intravenous    PHENobarbital injection 200 mg (Discontinued) 200 mg Every 4 Hours PRN 4/14/2019 4/14/2019    Sig - Route: Infuse 1.54 mL into a venous catheter Every 4 (Four) Hours As Needed (agitation aggression). - Intravenous    sodium chloride 0.9 % infusion (Discontinued) 125 mL/hr Continuous 4/13/2019 4/14/2019    Sig - Route: Infuse 125 mL/hr into a venous catheter Continuous. - Intravenous          Ventilator/Non-Invasive Ventilation Settings (From admission, onward)    Start     Ordered    04/14/19 0443  Ventilator - AC/VC; 15 (18); 40; 90%; 5; (500); 8  Continuous     Question Answer Comment   Vent Mode AC/VC    Breath rate 15 18   FiO2 40    FiO2 titrate for Sp02% =/> 90%    PEEP 5    Tidal Volume  500   Tidal Volume ML/Kg 8        04/14/19 0443          Operative/Procedure Notes (last 24 hours) (Notes from 4/13/2019  7:17 AM through 4/14/2019  7:17 AM)     No notes of this type exist for this encounter.        Physician Progress Notes (last  24 hours) (Notes from 4/13/2019  7:17 AM through 4/14/2019  7:17 AM)     No notes of this type exist for this encounter.        Medical Student Notes (last 24 hours) (Notes from 4/13/2019  7:17 AM through 4/14/2019  7:17 AM)     No notes of this type exist for this encounter.        Consult Notes (last 24 hours) (Notes from 4/13/2019  7:17 AM through 4/14/2019  7:17 AM)     No notes of this type exist for this encounter.

## 2019-04-14 NOTE — PLAN OF CARE
Problem: Patient Care Overview  Goal: Plan of Care Review  Outcome: Ongoing (interventions implemented as appropriate)   04/14/19 0656   OTHER   Outcome Summary pt intubated and sedated on Propofol and Versed gtt. Pt becomes immediately agitated when touched or moved. Pt tolerating vent settings, biteblock essential. VS stable & UOP adequate (cloudy/dark yellow). Family contact attempted to wife, but learned from a family friend that wife is in rehab and unalbe to be reached. Pt's brother's phone number is known, but not his name. Friend stated she would visit on 4/14. Pt in 4 pt restraints.    Coping/Psychosocial   Plan of Care Reviewed With patient   Plan of Care Review   Progress no change       Problem: Ventilation, Mechanical Invasive (Adult)  Goal: Signs and Symptoms of Listed Potential Problems Will be Absent, Minimized or Managed (Ventilation, Mechanical Invasive)  Outcome: Ongoing (interventions implemented as appropriate)    Goal: Signs and Symptoms of Listed Potential Problems Will be Absent, Minimized or Managed (Ventilation, Mechanical Invasive)  Outcome: Ongoing (interventions implemented as appropriate)      Problem: Restraint, Nonbehavioral (Nonviolent)  Goal: Rationale and Justification  Outcome: Ongoing (interventions implemented as appropriate)    Goal: Nonbehavioral (Nonviolent) Restraint: Absence of Injury/Harm  Outcome: Ongoing (interventions implemented as appropriate)    Goal: Nonbehavioral (Nonviolent) Restraint: Achievement of Discontinuation Criteria  Outcome: Ongoing (interventions implemented as appropriate)    Goal: Nonbehavioral (Nonviolent) Restraint: Preservation of Dignity and Wellbeing  Outcome: Ongoing (interventions implemented as appropriate)      Problem: Fall Risk (Adult)  Goal: Identify Related Risk Factors and Signs and Symptoms  Outcome: Ongoing (interventions implemented as appropriate)    Goal: Absence of Fall  Outcome: Ongoing (interventions implemented as  appropriate)      Problem: Overdose, Ingestion/Inhalants (Adult)  Goal: Signs and Symptoms of Listed Potential Problems Will be Absent, Minimized or Managed (Overdose, Ingestion/Inhalants)  Outcome: Ongoing (interventions implemented as appropriate)      Problem: Skin Injury Risk (Adult)  Goal: Identify Related Risk Factors and Signs and Symptoms  Outcome: Ongoing (interventions implemented as appropriate)    Goal: Skin Health and Integrity  Outcome: Ongoing (interventions implemented as appropriate)

## 2019-04-15 ENCOUNTER — APPOINTMENT (OUTPATIENT)
Dept: ULTRASOUND IMAGING | Facility: HOSPITAL | Age: 44
End: 2019-04-15

## 2019-04-15 LAB
ANION GAP SERPL CALCULATED.3IONS-SCNC: 16 MMOL/L
ARTERIAL PATENCY WRIST A: ABNORMAL
ATMOSPHERIC PRESS: 748 MMHG
BASE EXCESS BLDA CALC-SCNC: -0.9 MMOL/L (ref 0–2)
BASOPHILS # BLD AUTO: 0.03 10*3/MM3 (ref 0–0.2)
BASOPHILS NFR BLD AUTO: 0.4 % (ref 0–1.5)
BDY SITE: ABNORMAL
BUN BLD-MCNC: 10 MG/DL (ref 6–20)
BUN/CREAT SERPL: 16.4 (ref 7–25)
CALCIUM SPEC-SCNC: 8.5 MG/DL (ref 8.6–10.5)
CHLORIDE SERPL-SCNC: 101 MMOL/L (ref 98–107)
CK SERPL-CCNC: 833 U/L (ref 20–200)
CO2 SERPL-SCNC: 21 MMOL/L (ref 22–29)
CREAT BLD-MCNC: 0.61 MG/DL (ref 0.76–1.27)
DEPRECATED RDW RBC AUTO: 43.7 FL (ref 37–54)
EOSINOPHIL # BLD AUTO: 0.1 10*3/MM3 (ref 0–0.4)
EOSINOPHIL NFR BLD AUTO: 1.2 % (ref 0.3–6.2)
ERYTHROCYTE [DISTWIDTH] IN BLOOD BY AUTOMATED COUNT: 12.7 % (ref 12.3–15.4)
GAS FLOW AIRWAY: 30 LPM
GFR SERPL CREATININE-BSD FRML MDRD: 146 ML/MIN/1.73
GLUCOSE BLD-MCNC: 76 MG/DL (ref 65–99)
GLUCOSE BLDC GLUCOMTR-MCNC: 74 MG/DL (ref 70–130)
HAV IGM SERPL QL IA: NORMAL
HBV CORE IGM SERPL QL IA: NORMAL
HBV SURFACE AG SERPL QL IA: NORMAL
HCO3 BLDA-SCNC: 24 MMOL/L (ref 20–26)
HCT VFR BLD AUTO: 41.9 % (ref 37.5–51)
HCV AB SER DONR QL: NORMAL
HGB BLD-MCNC: 14.2 G/DL (ref 13–17.7)
IMM GRANULOCYTES # BLD AUTO: 0.02 10*3/MM3 (ref 0–0.05)
IMM GRANULOCYTES NFR BLD AUTO: 0.2 % (ref 0–0.5)
LYMPHOCYTES # BLD AUTO: 1.1 10*3/MM3 (ref 0.7–3.1)
LYMPHOCYTES NFR BLD AUTO: 12.9 % (ref 19.6–45.3)
Lab: ABNORMAL
MCH RBC QN AUTO: 31.8 PG (ref 26.6–33)
MCHC RBC AUTO-ENTMCNC: 33.9 G/DL (ref 31.5–35.7)
MCV RBC AUTO: 93.7 FL (ref 79–97)
MODALITY: ABNORMAL
MONOCYTES # BLD AUTO: 0.79 10*3/MM3 (ref 0.1–0.9)
MONOCYTES NFR BLD AUTO: 9.3 % (ref 5–12)
NEUTROPHILS # BLD AUTO: 6.5 10*3/MM3 (ref 1.4–7)
NEUTROPHILS NFR BLD AUTO: 76 % (ref 42.7–76)
NRBC BLD AUTO-RTO: 0 /100 WBC (ref 0–0)
PCO2 BLDA: 39.6 MM HG (ref 35–45)
PEEP RESPIRATORY: 5 CM[H2O]
PH BLDA: 7.39 PH UNITS (ref 7.35–7.45)
PLATELET # BLD AUTO: 168 10*3/MM3 (ref 140–450)
PMV BLD AUTO: 10.1 FL (ref 6–12)
PO2 BLDA: 76.3 MM HG (ref 83–108)
POTASSIUM BLD-SCNC: 4.5 MMOL/L (ref 3.5–5.2)
RBC # BLD AUTO: 4.47 10*6/MM3 (ref 4.14–5.8)
SAO2 % BLDCOA: 95.8 % (ref 94–99)
SET MECH RESP RATE: 20
SODIUM BLD-SCNC: 138 MMOL/L (ref 136–145)
VENTILATOR MODE: AC
VT ON VENT VENT: 500 ML
WBC NRBC COR # BLD: 8.54 10*3/MM3 (ref 3.4–10.8)

## 2019-04-15 PROCEDURE — 80048 BASIC METABOLIC PNL TOTAL CA: CPT | Performed by: INTERNAL MEDICINE

## 2019-04-15 PROCEDURE — 80074 ACUTE HEPATITIS PANEL: CPT | Performed by: FAMILY MEDICINE

## 2019-04-15 PROCEDURE — 85025 COMPLETE CBC W/AUTO DIFF WBC: CPT | Performed by: INTERNAL MEDICINE

## 2019-04-15 PROCEDURE — 94799 UNLISTED PULMONARY SVC/PX: CPT

## 2019-04-15 PROCEDURE — 82962 GLUCOSE BLOOD TEST: CPT

## 2019-04-15 PROCEDURE — 36600 WITHDRAWAL OF ARTERIAL BLOOD: CPT

## 2019-04-15 PROCEDURE — 82550 ASSAY OF CK (CPK): CPT | Performed by: FAMILY MEDICINE

## 2019-04-15 PROCEDURE — 82803 BLOOD GASES ANY COMBINATION: CPT

## 2019-04-15 PROCEDURE — 94003 VENT MGMT INPAT SUBQ DAY: CPT

## 2019-04-15 PROCEDURE — 25010000002 MIDAZOLAM 50 MG/10ML SOLUTION 10 ML VIAL: Performed by: INTERNAL MEDICINE

## 2019-04-15 PROCEDURE — 25010000002 PROPOFOL 1000 MG/ML EMULSION: Performed by: EMERGENCY MEDICINE

## 2019-04-15 PROCEDURE — 25010000002 LORAZEPAM PER 2 MG: Performed by: INTERNAL MEDICINE

## 2019-04-15 PROCEDURE — 25010000002 ENOXAPARIN PER 10 MG: Performed by: INTERNAL MEDICINE

## 2019-04-15 PROCEDURE — 76705 ECHO EXAM OF ABDOMEN: CPT

## 2019-04-15 RX ORDER — SODIUM CHLORIDE 0.9 % (FLUSH) 0.9 %
10 SYRINGE (ML) INJECTION EVERY 12 HOURS SCHEDULED
Status: DISCONTINUED | OUTPATIENT
Start: 2019-04-15 | End: 2019-04-19 | Stop reason: HOSPADM

## 2019-04-15 RX ORDER — SODIUM CHLORIDE 0.9 % (FLUSH) 0.9 %
20 SYRINGE (ML) INJECTION AS NEEDED
Status: DISCONTINUED | OUTPATIENT
Start: 2019-04-15 | End: 2019-04-19 | Stop reason: HOSPADM

## 2019-04-15 RX ORDER — BACITRACIN ZINC 500 [USP'U]/G
OINTMENT TOPICAL DAILY
Status: DISCONTINUED | OUTPATIENT
Start: 2019-04-15 | End: 2019-04-19 | Stop reason: HOSPADM

## 2019-04-15 RX ORDER — FENTANYL CITRATE 50 UG/ML
25 INJECTION, SOLUTION INTRAMUSCULAR; INTRAVENOUS
Status: DISCONTINUED | OUTPATIENT
Start: 2019-04-15 | End: 2019-04-16

## 2019-04-15 RX ORDER — SODIUM CHLORIDE 0.9 % (FLUSH) 0.9 %
10 SYRINGE (ML) INJECTION AS NEEDED
Status: DISCONTINUED | OUTPATIENT
Start: 2019-04-15 | End: 2019-04-19 | Stop reason: HOSPADM

## 2019-04-15 RX ADMIN — Medication: at 17:26

## 2019-04-15 RX ADMIN — ENOXAPARIN SODIUM 40 MG: 40 INJECTION SUBCUTANEOUS at 00:59

## 2019-04-15 RX ADMIN — FAMOTIDINE 20 MG: 10 INJECTION INTRAVENOUS at 21:41

## 2019-04-15 RX ADMIN — SODIUM CHLORIDE, POTASSIUM CHLORIDE, SODIUM LACTATE AND CALCIUM CHLORIDE 75 ML/HR: 600; 310; 30; 20 INJECTION, SOLUTION INTRAVENOUS at 08:42

## 2019-04-15 RX ADMIN — PROPOFOL 50 MCG/KG/MIN: 10 INJECTION, EMULSION INTRAVENOUS at 17:42

## 2019-04-15 RX ADMIN — PROPOFOL 50 MCG/KG/MIN: 10 INJECTION, EMULSION INTRAVENOUS at 11:33

## 2019-04-15 RX ADMIN — PROPOFOL 50 MCG/KG/MIN: 10 INJECTION, EMULSION INTRAVENOUS at 14:43

## 2019-04-15 RX ADMIN — SODIUM CHLORIDE, POTASSIUM CHLORIDE, SODIUM LACTATE AND CALCIUM CHLORIDE 75 ML/HR: 600; 310; 30; 20 INJECTION, SOLUTION INTRAVENOUS at 22:12

## 2019-04-15 RX ADMIN — PROPOFOL 50 MCG/KG/MIN: 10 INJECTION, EMULSION INTRAVENOUS at 05:52

## 2019-04-15 RX ADMIN — CHLORHEXIDINE GLUCONATE 0.12% ORAL RINSE 15 ML: 1.2 LIQUID ORAL at 19:54

## 2019-04-15 RX ADMIN — MIDAZOLAM 10 MG/HR: 5 INJECTION INTRAMUSCULAR; INTRAVENOUS at 12:04

## 2019-04-15 RX ADMIN — PROPOFOL 35 MCG/KG/MIN: 10 INJECTION, EMULSION INTRAVENOUS at 23:21

## 2019-04-15 RX ADMIN — PROPOFOL 50 MCG/KG/MIN: 10 INJECTION, EMULSION INTRAVENOUS at 00:51

## 2019-04-15 RX ADMIN — MIDAZOLAM 10 MG/HR: 5 INJECTION INTRAMUSCULAR; INTRAVENOUS at 18:10

## 2019-04-15 RX ADMIN — CHLORHEXIDINE GLUCONATE 0.12% ORAL RINSE 15 ML: 1.2 LIQUID ORAL at 08:41

## 2019-04-15 RX ADMIN — BACITRACIN: 500 OINTMENT TOPICAL at 17:26

## 2019-04-15 RX ADMIN — LORAZEPAM 2 MG: 2 INJECTION, SOLUTION INTRAMUSCULAR; INTRAVENOUS at 04:01

## 2019-04-15 RX ADMIN — PROPOFOL 50 MCG/KG/MIN: 10 INJECTION, EMULSION INTRAVENOUS at 08:42

## 2019-04-15 RX ADMIN — NICOTINE 1 PATCH: 21 PATCH, EXTENDED RELEASE TRANSDERMAL at 00:55

## 2019-04-15 RX ADMIN — LORAZEPAM 2 MG: 2 INJECTION, SOLUTION INTRAMUSCULAR; INTRAVENOUS at 05:53

## 2019-04-15 RX ADMIN — SODIUM CHLORIDE, POTASSIUM CHLORIDE, SODIUM LACTATE AND CALCIUM CHLORIDE 75 ML/HR: 600; 310; 30; 20 INJECTION, SOLUTION INTRAVENOUS at 22:09

## 2019-04-15 RX ADMIN — MIDAZOLAM 10 MG/HR: 5 INJECTION INTRAMUSCULAR; INTRAVENOUS at 05:14

## 2019-04-15 RX ADMIN — FAMOTIDINE 20 MG: 10 INJECTION INTRAVENOUS at 08:41

## 2019-04-15 RX ADMIN — SODIUM CHLORIDE, POTASSIUM CHLORIDE, SODIUM LACTATE AND CALCIUM CHLORIDE 200 ML/HR: 600; 310; 30; 20 INJECTION, SOLUTION INTRAVENOUS at 01:54

## 2019-04-15 NOTE — CONSULTS
Adult Nutrition  Assessment    Patient Name:  Marko Walters  YOB: 1975  MRN: [id number]  Admit Date:  4/13/2019    Assessment Date:  4/15/2019    Comments:  The patient was admitted to the hospital d/t an accidental overdose. The patient is currently intubated and sedated. TF is to be initiated today with Isosource HN with goal rate of 55 mL/hr. Patient is receiving propofol at 27.4 mL/hr which is currently providing 723 kcals. The TF at goal rate will provide 1,584 kcals and 71 grams of protein. The TF and propofol together will provide 2,307 kcals and 71 grams of protein which will provide 100% of calculated energy and protein needs. I will monitor TF tolerance, advancement, and make adjustments as necessary.     Reason for Assessment     Row Name 04/15/19 1057          Reason for Assessment    Reason For Assessment  nurse/nurse practitioner consult  (Pended)      Identified At Risk by Screening Criteria  tube feeding or parenteral nutrition  (Pended)          Nutrition/Diet History     Row Name 04/15/19 1058          Nutrition/Diet History    Typical Food/Fluid Intake  Patient currently sedated and intubated. TF to be initiated today.   (Pended)          Anthropometrics     Row Name 04/15/19 0622          Anthropometrics    Weight  91.4 kg (201 lb 8 oz)         Labs/Tests/Procedures/Meds     Row Name 04/15/19 1058          Labs/Procedures/Meds    Lab Results Reviewed  reviewed, pertinent  (Pended)         Diagnostic Tests/Procedures    Diagnostic Test/Procedure Reviewed  reviewed, pertinent  (Pended)      Diagnostic Test/Procedures Comments  intubated, sedated  (Pended)         Medications    Pertinent Medications Reviewed  reviewed, pertinent  (Pended)      Pertinent Medications Comments  pepcid, versed, propofol-27.4 mL/hr   (Pended)          Physical Findings     Row Name 04/15/19 1059          Physical Findings    Overall Physical Appearance  on ventilator support  (Pended)       Tubes  nasogastric tube  (Pended)          Estimated/Assessed Needs     Row Name 04/15/19 1059          Calculation Measurements    Weight Used For Calculations  91.4 kg (201 lb 8 oz)  (Pended)         Estimated/Assessed Needs    Additional Documentation  Yellowstone-St. Jeor Equation (Group);Fluid Requirements (Group);Calorie Requirements (Group);KCAL/KG (Group);Protein Requirements (Group)  (Pended)         Calorie Requirements    Weight Used For Calorie Calculations  91.4 kg (201 lb 8 oz)  (Pended)      Estimated Calorie Requirement (kcal/day)  2300  (Pended)      Estimated Calorie Need Method  kcal/kg  (Pended)         KCAL/KG    14 Kcal/Kg (kcal)  1279.6  (Pended)      15 Kcal/Kg (kcal)  1371  (Pended)      18 Kcal/Kg (kcal)  1645.2  (Pended)      20 Kcal/Kg (kcal)  1828  (Pended)      25 Kcal/Kg (kcal)  2285  (Pended)      30 Kcal/Kg (kcal)  2742  (Pended)      35 Kcal/Kg (kcal)  3199  (Pended)      40 Kcal/Kg (kcal)  3656  (Pended)      45 Kcal/Kg (kcal)  4113  (Pended)      50 Kcal/Kg (kcal)  4570  (Pended)         Yellowstone-St. Jeor Equation    RMR (Yellowstone-St. Jeor Equation)  1825.25  (Pended)         Protein Requirements    Weight Used For Protein Calculations  91.4 kg (201 lb 8 oz)  (Pended)      Est Protein Requirement Amount (gms/kg)  0.8 gm protein  (Pended)      Estimated Protein Requirements (gms/day)  73.12  (Pended)         Fluid Requirements    Estimated Fluid Requirements (mL/day)  2300  (Pended)      RDA Method (mL)  2300  (Pended)      Ronda-Henry Method (over 20 kg)  3328  (Pended)          Nutrition Prescription Ordered     Row Name 04/15/19 1103          Nutrition Prescription PO    Current PO Diet  NPO  (Pended)         Nutrition Prescription EN    Enteral Route  NG  (Pended)      Product  Isosource HN (Osmolite 1.2)  (Pended)      TF Delivery Method  Continuous  (Pended)      Continuous TF Goal Rate (mL/hr)  55 mL/hr  (Pended)      Continuous TF Current Rate (mL/hr)  20 mL/hr  (Pended)       Continuous TF Goal Volume (mL)  1320 mL  (Pended)      Continuous TF Current Volume (mL)  480 mL  (Pended)      Water flush (mL)   25 mL  (Pended)      Water Flush Frequency  Per hour  (Pended)          Evaluation of Received Nutrient/Fluid Intake     Row Name 04/15/19 1059          Calculation Measurements    Weight Used For Calculations  91.4 kg (201 lb 8 oz)  (Pended)          Evaluation of Prescribed Nutrient/Fluid Intake     Row Name 04/15/19 1059          Calculation Measurements    Weight Used For Calculations  91.4 kg (201 lb 8 oz)  (Pended)              Electronically signed by:  Lydia Villa  04/15/19 11:07 AM

## 2019-04-15 NOTE — PLAN OF CARE
Problem: Ventilation, Mechanical Invasive (Adult)  Intervention: Prevent Airway Displacement/Mechanical Dysfunction   04/15/19 1809   Prevent Airway Displacement/Mechanical Dysfunction   Airway Safety Measures manual resuscitator/mask/valve in room;suction at bedside     Intervention: Prevent Airway-Related Skin/Tissue Breakdown   04/15/19 1809   Skin Interventions   Device Skin Pressure Protection adhesive use limited;skin-to-device areas padded     Intervention: Prevent Ventilator-Induced Lung Injury   04/15/19 1809   Respiratory Interventions   Lung Protection Measures ventilator synchrony promoted;plateau/inspiratory pressure monitored     Intervention: Promote Early Weaning/Extubation   04/15/19 1809   Pain/Comfort/Sleep Interventions   Sleep/Rest Enhancement awakenings minimized;family presence promoted;consistent schedule promoted     Intervention: Prevent Ventilator-Associated Pneumonia (VAP)   04/15/19 1809   Positioning   Head of Bed (HOB) HOB at 30 degrees   VAP Prevention Measures   VAP Prevention Bundle VTE prophylaxis provided;vent circuit breaks minimized     Intervention: Optimize Oxygenation/Ventilation   04/15/19 1809   Respiratory Interventions   Airway/Ventilation Management airway patency maintained;humidification applied;calming measures promoted       Goal: Signs and Symptoms of Listed Potential Problems Will be Absent, Minimized or Managed (Ventilation, Mechanical Invasive)  Outcome: Ongoing (interventions implemented as appropriate)

## 2019-04-15 NOTE — PROGRESS NOTES
MEDICINE DAILY PROGRESS NOTE  NAME: Marko Walters  : 1975  MRN: [id number]     LOS: 2 days     PROVIDER OF SERVICE: Jose Luevano MD    Chief Complaint: Accidental drug overdose    Subjective:   HPI: 43 year old white male with medical history significant for opioid abuse and meth amphetamine abuse was brought in by EMS for agitation after partying with friends.  He was intubated in the field.    Interval History:  History taken from: chart family RN  4/15. Patient remains intubated/sedated.    F - TFs to start today.   A - Fentanyl  S - Propofol/Versed - PRN ativan.  T - Lovenox  H - >30 deg  U - Pepcid  G - N/A      Ventilator/Non-Invasive Ventilation Settings (From admission, onward)    Start     Ordered    19  Ventilator - AC/VC; 15 (18); 40; 90%; 5; (500); 8  Continuous     Question Answer Comment   Vent Mode AC/VC    Breath rate 15 18   FiO2 40    FiO2 titrate for Sp02% =/> 90%    PEEP 5    Tidal Volume  500   Tidal Volume ML/Kg 8        19        Intake/Output       19 0701 - 19 0700 19 0701 - 04/15/19 0700 04/15/19 0701 - 19 0700      4842-0296 8270-0328 Total 6173-2531 9536-3659 Total 3724-7371 6884-3417 Total       Intake    I.V.  --  1245 1245  2721  2970 5691  --  -- --    Other  --  30 30  --  30 30  30  -- 30    Flush/ Irrigation Intake (mL) (NG/OG Tube Orogastric 16 Fr Center mouth) -- 30 30 -- 30 30 30 -- 30    IV Piggyback  --  1000 1000  --  -- --  --  -- --    Total Intake -- 2275 2275 2721 3000 5721 30 -- 30       Output    Urine  --  920 932  725  1400 2125  490  -- 490    Emesis/NG output  --  -- --  --  480 480  --  -- --    Total Output -- 467 304 133  2605 490 -- 490        Intake/Output       19 0701 - 19 0700 19 0701 - 04/15/19 0700 04/15/19 0701 - 19 0700      0811-9025 0502-8718 Total 2186-9136 7321-2699 Total 8855-7811 5683-3678 Total       Intake    I.V.  --  1245 1245  9185 4213 8999  --  --  --    Other  --  30 30  --  30 30  30  -- 30    Flush/ Irrigation Intake (mL) (NG/OG Tube Orogastric 16 Fr Center mouth) -- 30 30 -- 30 30 30 -- 30    IV Piggyback  --  1000 1000  --  -- --  --  -- --    Total Intake -- 2275 2275 2721 3000 5721 30 -- 30       Output    Urine  --  625 625  725  1400 2125  490  -- 490    Emesis/NG output  --  -- --  --  480 480  --  -- --    Total Output -- 822 377  2605 490 -- 490          Intake/Output       04/13/19 0701 - 04/14/19 0700 04/14/19 0701 - 04/15/19 0700 04/15/19 0701 - 04/16/19 0700      3244-8040 8636-5523 Total 1550-9096 1725-2312 Total 6998-4016 0613-5882 Total       Intake    I.V.  --  1245 1245  2721  2970 5691  --  -- --    Other  --  30 30  --  30 30  30  -- 30    Flush/ Irrigation Intake (mL) (NG/OG Tube Orogastric 16 Fr Center mouth) -- 30 30 -- 30 30 30 -- 30    IV Piggyback  --  1000 1000  --  -- --  --  -- --    Total Intake -- 2275 2275 2721 3000 5721 30 -- 30       Output    Urine  --  625 625  725  1400 2125  490  -- 490    Emesis/NG output  --  -- --  --  480 480  --  -- --    Total Output -- 625  2605 490 -- 490          Review of Systems:   Review of Systems   Unable to perform ROS: Intubated       Objective:     Vital Signs  Vitals:    04/15/19 0800 04/15/19 0802 04/15/19 0817 04/15/19 0917   BP:  122/72  (P) 133/80   Pulse:   75 (P) 70   Resp:    (P) 20   Temp: 97 °F (36.1 °C)      TempSrc: Temporal      SpO2:   100% (P) 100%   Weight:       Height:           Physical Exam  Physical Exam   Constitutional: He appears well-developed and well-nourished. No distress.   HENT:   Head: Normocephalic and atraumatic.   Right Ear: External ear normal.   Left Ear: External ear normal.   Nose: Nose normal.   ETT in place.   Eyes: Right eye exhibits no discharge. Left eye exhibits no discharge. No scleral icterus.   Neck: Neck supple. No thyromegaly present.   Cardiovascular: Normal rate, regular rhythm and normal heart sounds.    Pulmonary/Chest: Effort normal and breath sounds normal. No respiratory distress. He has no wheezes. He has no rales. He exhibits no tenderness.   ETT in place.   Abdominal: Soft. Bowel sounds are normal. He exhibits no distension and no mass. There is no tenderness. There is no rebound and no guarding.   Musculoskeletal: Normal range of motion. He exhibits no edema.   Neurological:   Unable to assess. Intubated/Sedated.   Skin: Skin is warm and dry. No rash noted. He is not diaphoretic. No erythema. No pallor.   Psychiatric:   Unable to assess. Intubated/Sedated.   Nursing note and vitals reviewed.      Medication Review    Current Facility-Administered Medications:   •  acetaminophen (TYLENOL) tablet 650 mg, 650 mg, Oral, Q6H PRN, Andrea Chappell MD  •  albuterol (PROVENTIL) nebulizer solution 0.083% 2.5 mg/3mL, 2.5 mg, Nebulization, Q4H PRN, Andrea Chappell MD  •  chlorhexidine (PERIDEX) 0.12 % solution 15 mL, 15 mL, Mouth/Throat, Q12H, Andrea Chappell MD, 15 mL at 04/15/19 0841  •  dextrose (D50W) 25 g/ 50mL Intravenous Solution 50 mL, 50 mL, Intravenous, Q1H PRN, Catarino Grimm MD, 50 mL at 04/13/19 1925  •  enoxaparin (LOVENOX) syringe 40 mg, 40 mg, Subcutaneous, Q24H, Andrea Chappell MD, 40 mg at 04/15/19 0059  •  famotidine (PEPCID) injection 20 mg, 20 mg, Intravenous, Q12H, Andrea Chappell MD, 20 mg at 04/15/19 0841  •  fentaNYL citrate (PF) (SUBLIMAZE) injection 100 mcg, 100 mcg, Intravenous, Q1H PRN, Andrea Chappell MD, 100 mcg at 04/14/19 0511  •  fentaNYL citrate (PF) (SUBLIMAZE) injection 150 mcg, 150 mcg, Intravenous, Once, Andrea Chappell MD  •  lactated ringers infusion, 75 mL/hr, Intravenous, Continuous, Andrea Chappell MD, Last Rate: 75 mL/hr at 04/15/19 0842, 75 mL/hr at 04/15/19 0842  •  LORazepam (ATIVAN) injection 2 mg, 2 mg, Intravenous, Q30 Min PRN, Andrea Chappell MD, 2 mg at 04/15/19 0553  •  midazolam (VERSED) 50 mg in sodium chloride 0.9 % 100 mL (0.5 mg/mL) infusion, 1-10 mg/hr,  Intravenous, Titrated, Andrea Chappell MD, Last Rate: 20 mL/hr at 04/15/19 0608, 10 mg/hr at 04/15/19 0608  •  midazolam (VERSED) injection 10 mg, 10 mg, Intravenous, Once, Andrea Chappell MD  •  nicotine (NICODERM CQ) 21 MG/24HR patch 1 patch, 1 patch, Transdermal, Q24H, Andrea Chappell MD, 1 patch at 04/15/19 0055  •  PHENobarbital injection 200 mg, 200 mg, Intravenous, Q4H PRN, Andrea Chappell MD  •  propofol (DIPRIVAN) infusion 10 mg/mL 100 mL, 5-50 mcg/kg/min, Intravenous, Titrated, Lobo Lieberman MD, Last Rate: 27.4 mL/hr at 04/15/19 0842, 50 mcg/kg/min at 04/15/19 0842  •  sodium chloride 0.9 % flush 10 mL, 10 mL, Intravenous, PRN, Lobo Lieberman MD  •  sodium chloride 0.9 % flush 10 mL, 10 mL, Intravenous, Q12H, Andrea Chappell MD  •  sodium chloride 0.9 % flush 10 mL, 10 mL, Intravenous, Q12H, Andrea Chappell MD  •  sodium chloride 0.9 % flush 10 mL, 10 mL, Intravenous, Q12H, Andrea Chappell MD  •  sodium chloride 0.9 % flush 10 mL, 10 mL, Intravenous, PRN, Andrea Chappell MD  •  sodium chloride 0.9 % flush 20 mL, 20 mL, Intravenous, PRN, Andrea Chappell MD  •  sodium chloride 0.9 % flush 3 mL, 3 mL, Intravenous, Q12H, Andrea Chappell MD, 3 mL at 04/14/19 2114  •  sodium chloride 0.9 % flush 3-10 mL, 3-10 mL, Intravenous, PRN, Andrea Chappell MD     Diagnostic Data    Lab Results (last 24 hours)     Procedure Component Value Units Date/Time    CBC & Differential [[id number]] Collected:  04/15/19 0540    Specimen:  Blood Updated:  04/15/19 0653    Narrative:       The following orders were created for panel order CBC & Differential.  Procedure                               Abnormality         Status                     ---------                               -----------         ------                     Scan Slide[[id number]]                                                                  CBC Auto Differential[[id number]]        Abnormal            Final result                 Please view results for these tests on the  individual orders.    CBC Auto Differential [[id number]]  (Abnormal) Collected:  04/15/19 0540    Specimen:  Blood Updated:  04/15/19 0625     WBC 8.54 10*3/mm3      RBC 4.47 10*6/mm3      Hemoglobin 14.2 g/dL      Hematocrit 41.9 %      MCV 93.7 fL      MCH 31.8 pg      MCHC 33.9 g/dL      RDW 12.7 %      RDW-SD 43.7 fl      MPV 10.1 fL      Platelets 168 10*3/mm3      Neutrophil % 76.0 %      Lymphocyte % 12.9 %      Monocyte % 9.3 %      Eosinophil % 1.2 %      Basophil % 0.4 %      Immature Grans % 0.2 %      Neutrophils, Absolute 6.50 10*3/mm3      Lymphocytes, Absolute 1.10 10*3/mm3      Monocytes, Absolute 0.79 10*3/mm3      Eosinophils, Absolute 0.10 10*3/mm3      Basophils, Absolute 0.03 10*3/mm3      Immature Grans, Absolute 0.02 10*3/mm3      nRBC 0.0 /100 WBC     Basic Metabolic Panel [[id number]]  (Abnormal) Collected:  04/15/19 0540    Specimen:  Blood Updated:  04/15/19 0620     Glucose 76 mg/dL      BUN 10 mg/dL      Creatinine 0.61 mg/dL      Sodium 138 mmol/L      Potassium 4.5 mmol/L      Chloride 101 mmol/L      CO2 21.0 mmol/L      Calcium 8.5 mg/dL      eGFR Non African Amer 146 mL/min/1.73      BUN/Creatinine Ratio 16.4     Anion Gap 16.0 mmol/L     Narrative:       GFR Normal >60  Chronic Kidney Disease <60  Kidney Failure <15    Blood Gas, Arterial [[id number]]  (Abnormal) Collected:  04/15/19 0441    Specimen:  Arterial Blood Updated:  04/15/19 0453     Site Left Radial     Raj's Test N/A     pH, Arterial 7.390 pH units      pCO2, Arterial 39.6 mm Hg      pO2, Arterial 76.3 mm Hg      Comment: 84 Value below reference range        HCO3, Arterial 24.0 mmol/L      Base Excess, Arterial -0.9 mmol/L      Comment: 84 Value below reference range        O2 Saturation, Arterial 95.8 %      Barometric Pressure for Blood Gas 748 mmHg      Modality Ventilator     Flow Rate 30.0 lpm      Ventilator Mode AC     Set Tidal Volume 500     Set Mech Resp Rate 20.0     PEEP 5.0     Collected by RT     Comment:  Meter: [id number]     :  [id number]       Blood Culture - Blood, Arm, Left [[id number]] Collected:  04/13/19 2333    Specimen:  Blood from Arm, Left Updated:  04/15/19 0032     Blood Culture No growth at 24 hours    Blood Culture - Blood, Arm, Left [[id number]] Collected:  04/13/19 2333    Specimen:  Blood from Arm, Left Updated:  04/15/19 0032     Blood Culture No growth at 24 hours          I reviewed the patient's new clinical results.    Assessment/Plan:     Active Hospital Problems    Diagnosis POA   • **Accidental drug overdose [T50.901A] Yes   • Amphetamine abuse (CMS/HCC) [F15.10] Yes       #. Accidental drug overdose. Intubated/sedated. Pulm consulted for vent management. UDS suggestive of opioids/meth.  #. Acute metabolic encephalopathy. Likely secondary to meth/opioids. Currently intubated/Sedated.  #. Respiratory failure. Patient intubated in the field. Suspect this may have been secondary to behavior or low GCS.   #. Methamphetamine abuse. Will need counseling once extubated.  #. Tobacco abuse. Nicotine patch.  #. TERRENCE. Resolved.  #. Non traumatic rhabdomyolysis. Repeat CK pending. IVFs.  #. Elevated LFTs on admission. Will now get hepatitis panel and Liver US.    Will monitor patient's hospital course and adjust treatment as hospital course dictates.    DVT prophylaxis: Lovenox  Code status is   Code Status and Medical Interventions:   Ordered at: 04/14/19 0045     Code Status:    CPR     Medical Interventions (Level of Support Prior to Arrest):    Full       Plan for disposition:Planning in progress.      Time:           This document has been electronically signed by Jose Luevano MD on April 15, 2019 10:19 AM

## 2019-04-15 NOTE — PLAN OF CARE
Problem: Patient Care Overview  Goal: Plan of Care Review  Outcome: Ongoing (interventions implemented as appropriate)   04/15/19 0512   Plan of Care Review   Progress no change       Problem: Ventilation, Mechanical Invasive (Adult)  Intervention: Prevent Airway Displacement/Mechanical Dysfunction   04/15/19 0512   Prevent Airway Displacement/Mechanical Dysfunction   Airway Safety Measures manual resuscitator at bedside     Intervention: Prevent Airway-Related Skin/Tissue Breakdown   04/15/19 0512   Skin Interventions   Device Skin Pressure Protection absorbent pad utilized/changed     Intervention: Prevent Ventilator-Induced Lung Injury   04/15/19 0512   Respiratory Interventions   Lung Protection Measures fluid excess minimized;low tidal volume provided;optimal PEEP applied;ventilator synchrony promoted;ventilator waveforms monitored;plateau/inspiratory pressure monitored;lung compliance monitored;low inspiratory pressure provided     Intervention: Optimize Oxygenation/Ventilation   04/15/19 0512   Respiratory Interventions   Airway/Ventilation Management airway patency maintained;calming measures promoted;humidification applied;pulmonary hygiene promoted

## 2019-04-15 NOTE — PLAN OF CARE
Problem: Patient Care Overview  Goal: Plan of Care Review  Outcome: Ongoing (interventions implemented as appropriate)   04/15/19 1112   OTHER   Outcome Summary Patient admitted to hospital d/t accidental overdose. Patient currently intubated. TF to be initiated today with Isosource HN with goal rate of 55 mL/hr. Will monitor.    Coping/Psychosocial   Plan of Care Reviewed With patient;caregiver   Plan of Care Review   Progress no change

## 2019-04-15 NOTE — PLAN OF CARE
Problem: Patient Care Overview  Goal: Plan of Care Review  Outcome: Ongoing (interventions implemented as appropriate)   04/15/19 0648   OTHER   Outcome Summary Pt intubated. IV access challenged as veins easily blow. PICC line requested. VSS, Urine clearing wiith minimal sediment. UOP adequate.    Coping/Psychosocial   Plan of Care Reviewed With family   Plan of Care Review   Progress no change       Problem: Ventilation, Mechanical Invasive (Adult)  Goal: Signs and Symptoms of Listed Potential Problems Will be Absent, Minimized or Managed (Ventilation, Mechanical Invasive)  Outcome: Ongoing (interventions implemented as appropriate)    Goal: Signs and Symptoms of Listed Potential Problems Will be Absent, Minimized or Managed (Ventilation, Mechanical Invasive)  Outcome: Ongoing (interventions implemented as appropriate)      Problem: Restraint, Nonbehavioral (Nonviolent)  Goal: Rationale and Justification  Outcome: Ongoing (interventions implemented as appropriate)    Goal: Nonbehavioral (Nonviolent) Restraint: Absence of Injury/Harm  Outcome: Ongoing (interventions implemented as appropriate)    Goal: Nonbehavioral (Nonviolent) Restraint: Achievement of Discontinuation Criteria  Outcome: Ongoing (interventions implemented as appropriate)    Goal: Nonbehavioral (Nonviolent) Restraint: Preservation of Dignity and Wellbeing  Outcome: Ongoing (interventions implemented as appropriate)      Problem: Fall Risk (Adult)  Goal: Identify Related Risk Factors and Signs and Symptoms  Outcome: Ongoing (interventions implemented as appropriate)    Goal: Absence of Fall  Outcome: Ongoing (interventions implemented as appropriate)      Problem: Overdose, Ingestion/Inhalants (Adult)  Goal: Signs and Symptoms of Listed Potential Problems Will be Absent, Minimized or Managed (Overdose, Ingestion/Inhalants)  Outcome: Ongoing (interventions implemented as appropriate)      Problem: Skin Injury Risk (Adult)  Goal: Skin Health and  Integrity  Outcome: Ongoing (interventions implemented as appropriate)      Problem: Pain, Acute (Adult)  Goal: Identify Related Risk Factors and Signs and Symptoms  Outcome: Ongoing (interventions implemented as appropriate)    Goal: Acceptable Pain Control/Comfort Level  Outcome: Ongoing (interventions implemented as appropriate)

## 2019-04-15 NOTE — NURSING NOTE
Patient has multiple areas of   Callus with ulcers on his feet. They appear to be from pressure . See assessments for details. There is callus around wounds. Feet are dry and  Crusty. Needs good skin care , off loading and protection. All POA. He has tape tears on his right arm . Medial 2.5 x 0.6 x 01 cm and lateral 0.2 x 1 x 0.1 cm. These were not POA. Needs wound care and protection.

## 2019-04-16 LAB
ALBUMIN SERPL-MCNC: 3.2 G/DL (ref 3.5–5.2)
ALBUMIN/GLOB SERPL: 1.1 G/DL
ALP SERPL-CCNC: 72 U/L (ref 39–117)
ALT SERPL W P-5'-P-CCNC: 56 U/L (ref 1–41)
ANION GAP SERPL CALCULATED.3IONS-SCNC: 12 MMOL/L
ARTERIAL PATENCY WRIST A: POSITIVE
AST SERPL-CCNC: 53 U/L (ref 1–40)
ATMOSPHERIC PRESS: 750 MMHG
BASE EXCESS BLDA CALC-SCNC: -0.1 MMOL/L (ref 0–2)
BASOPHILS # BLD AUTO: 0.02 10*3/MM3 (ref 0–0.2)
BASOPHILS NFR BLD AUTO: 0.3 % (ref 0–1.5)
BDY SITE: ABNORMAL
BILIRUB SERPL-MCNC: 0.4 MG/DL (ref 0.2–1.2)
BUN BLD-MCNC: 9 MG/DL (ref 6–20)
BUN/CREAT SERPL: 14.5 (ref 7–25)
CALCIUM SPEC-SCNC: 8.5 MG/DL (ref 8.6–10.5)
CHLORIDE SERPL-SCNC: 104 MMOL/L (ref 98–107)
CO2 SERPL-SCNC: 22 MMOL/L (ref 22–29)
CREAT BLD-MCNC: 0.62 MG/DL (ref 0.76–1.27)
DEPRECATED RDW RBC AUTO: 44.5 FL (ref 37–54)
EOSINOPHIL # BLD AUTO: 0.06 10*3/MM3 (ref 0–0.4)
EOSINOPHIL NFR BLD AUTO: 0.9 % (ref 0.3–6.2)
ERYTHROCYTE [DISTWIDTH] IN BLOOD BY AUTOMATED COUNT: 13 % (ref 12.3–15.4)
GFR SERPL CREATININE-BSD FRML MDRD: 143 ML/MIN/1.73
GLOBULIN UR ELPH-MCNC: 3 GM/DL
GLUCOSE BLD-MCNC: 98 MG/DL (ref 65–99)
GLUCOSE BLDC GLUCOMTR-MCNC: 106 MG/DL (ref 70–130)
GLUCOSE BLDC GLUCOMTR-MCNC: 74 MG/DL (ref 70–130)
GLUCOSE BLDC GLUCOMTR-MCNC: 91 MG/DL (ref 70–130)
GLUCOSE BLDC GLUCOMTR-MCNC: 93 MG/DL (ref 70–130)
HCO3 BLDA-SCNC: 24.3 MMOL/L (ref 20–26)
HCT VFR BLD AUTO: 42.3 % (ref 37.5–51)
HGB BLD-MCNC: 14.2 G/DL (ref 13–17.7)
HOROWITZ INDEX BLD+IHG-RTO: 30 %
IMM GRANULOCYTES # BLD AUTO: 0.01 10*3/MM3 (ref 0–0.05)
IMM GRANULOCYTES NFR BLD AUTO: 0.1 % (ref 0–0.5)
LYMPHOCYTES # BLD AUTO: 1.26 10*3/MM3 (ref 0.7–3.1)
LYMPHOCYTES NFR BLD AUTO: 18.4 % (ref 19.6–45.3)
Lab: ABNORMAL
MCH RBC QN AUTO: 31.6 PG (ref 26.6–33)
MCHC RBC AUTO-ENTMCNC: 33.6 G/DL (ref 31.5–35.7)
MCV RBC AUTO: 94 FL (ref 79–97)
MODALITY: ABNORMAL
MONOCYTES # BLD AUTO: 0.69 10*3/MM3 (ref 0.1–0.9)
MONOCYTES NFR BLD AUTO: 10.1 % (ref 5–12)
NEUTROPHILS # BLD AUTO: 4.8 10*3/MM3 (ref 1.4–7)
NEUTROPHILS NFR BLD AUTO: 70.2 % (ref 42.7–76)
NRBC BLD AUTO-RTO: 0 /100 WBC (ref 0–0)
PCO2 BLDA: 38.5 MM HG (ref 35–45)
PEEP RESPIRATORY: 5 CM[H2O]
PH BLDA: 7.41 PH UNITS (ref 7.35–7.45)
PLATELET # BLD AUTO: 242 10*3/MM3 (ref 140–450)
PMV BLD AUTO: 9.4 FL (ref 6–12)
PO2 BLDA: 70.9 MM HG (ref 83–108)
POTASSIUM BLD-SCNC: 3.9 MMOL/L (ref 3.5–5.2)
PROT SERPL-MCNC: 6.2 G/DL (ref 6–8.5)
RBC # BLD AUTO: 4.5 10*6/MM3 (ref 4.14–5.8)
SAO2 % BLDCOA: 94.8 % (ref 94–99)
SET MECH RESP RATE: 20
SODIUM BLD-SCNC: 138 MMOL/L (ref 136–145)
VENTILATOR MODE: AC
VT ON VENT VENT: 500 ML
WBC NRBC COR # BLD: 6.84 10*3/MM3 (ref 3.4–10.8)

## 2019-04-16 PROCEDURE — 99232 SBSQ HOSP IP/OBS MODERATE 35: CPT | Performed by: INTERNAL MEDICINE

## 2019-04-16 PROCEDURE — 25010000002 MIDAZOLAM 50 MG/10ML SOLUTION 10 ML VIAL: Performed by: INTERNAL MEDICINE

## 2019-04-16 PROCEDURE — 80053 COMPREHEN METABOLIC PANEL: CPT | Performed by: FAMILY MEDICINE

## 2019-04-16 PROCEDURE — 94799 UNLISTED PULMONARY SVC/PX: CPT

## 2019-04-16 PROCEDURE — 25010000002 PROPOFOL 1000 MG/ML EMULSION: Performed by: EMERGENCY MEDICINE

## 2019-04-16 PROCEDURE — 85025 COMPLETE CBC W/AUTO DIFF WBC: CPT | Performed by: FAMILY MEDICINE

## 2019-04-16 PROCEDURE — 94003 VENT MGMT INPAT SUBQ DAY: CPT

## 2019-04-16 PROCEDURE — 82962 GLUCOSE BLOOD TEST: CPT

## 2019-04-16 PROCEDURE — 82803 BLOOD GASES ANY COMBINATION: CPT

## 2019-04-16 PROCEDURE — 36600 WITHDRAWAL OF ARTERIAL BLOOD: CPT

## 2019-04-16 PROCEDURE — 25010000002 ENOXAPARIN PER 10 MG: Performed by: INTERNAL MEDICINE

## 2019-04-16 RX ORDER — SODIUM CHLORIDE 9 MG/ML
75 INJECTION, SOLUTION INTRAVENOUS CONTINUOUS
Status: DISCONTINUED | OUTPATIENT
Start: 2019-04-16 | End: 2019-04-18

## 2019-04-16 RX ADMIN — PROPOFOL 50 MCG/KG/MIN: 10 INJECTION, EMULSION INTRAVENOUS at 16:36

## 2019-04-16 RX ADMIN — PROPOFOL 25 MCG/KG/MIN: 10 INJECTION, EMULSION INTRAVENOUS at 10:44

## 2019-04-16 RX ADMIN — NICOTINE 1 PATCH: 21 PATCH, EXTENDED RELEASE TRANSDERMAL at 00:52

## 2019-04-16 RX ADMIN — Medication: at 10:01

## 2019-04-16 RX ADMIN — FAMOTIDINE 20 MG: 10 INJECTION INTRAVENOUS at 10:00

## 2019-04-16 RX ADMIN — SODIUM CHLORIDE 75 ML/HR: 9 INJECTION, SOLUTION INTRAVENOUS at 10:50

## 2019-04-16 RX ADMIN — CHLORHEXIDINE GLUCONATE 0.12% ORAL RINSE 15 ML: 1.2 LIQUID ORAL at 20:33

## 2019-04-16 RX ADMIN — FAMOTIDINE 20 MG: 10 INJECTION INTRAVENOUS at 20:33

## 2019-04-16 RX ADMIN — ENOXAPARIN SODIUM 40 MG: 40 INJECTION SUBCUTANEOUS at 00:52

## 2019-04-16 RX ADMIN — MIDAZOLAM 4 MG/HR: 5 INJECTION INTRAMUSCULAR; INTRAVENOUS at 00:40

## 2019-04-16 RX ADMIN — PROPOFOL 50 MCG/KG/MIN: 10 INJECTION, EMULSION INTRAVENOUS at 23:44

## 2019-04-16 RX ADMIN — CHLORHEXIDINE GLUCONATE 0.12% ORAL RINSE 15 ML: 1.2 LIQUID ORAL at 10:00

## 2019-04-16 RX ADMIN — SODIUM CHLORIDE, PRESERVATIVE FREE 3 ML: 5 INJECTION INTRAVENOUS at 20:32

## 2019-04-16 RX ADMIN — BACITRACIN: 500 OINTMENT TOPICAL at 10:01

## 2019-04-16 RX ADMIN — PROPOFOL 30 MCG/KG/MIN: 10 INJECTION, EMULSION INTRAVENOUS at 03:50

## 2019-04-16 RX ADMIN — PROPOFOL 25 MCG/KG/MIN: 10 INJECTION, EMULSION INTRAVENOUS at 16:10

## 2019-04-16 RX ADMIN — PROPOFOL 50 MCG/KG/MIN: 10 INJECTION, EMULSION INTRAVENOUS at 20:33

## 2019-04-16 NOTE — PROGRESS NOTES
"Intensive Care Follow-up      LOS: 3 days     Mr. Marko Walters, 43 y.o. male is followed for: Drug OD agitation and ventilator     CHIEF COMPLIANT: Shortness of breath    Subjective - Interval History     This gentleman had accidental drug OD with behavior problem wound up being ventilated and sedated.  The ventilator was turned down however with a spontaneous breathing trial he is still breathing 40 times a minute and did not wake up    The patient's relevant past medical, surgical and social history were reviewed and updated in Epic as appropriate.     Objective   /74   Pulse 91   Temp 97 °F (36.1 °C) (Temporal)   Resp 21   Ht 177.8 cm (70\")   Wt 92.4 kg (203 lb 11.3 oz)   SpO2 94%   BMI 29.23 kg/m²       Vent Settings: FiO2 (%):  [30 %] 30 %  S RR:  [20] 20  PEEP/CPAP (cm H2O):  [5 cm H20] 5 cm H20  KY SUP:  [0 cm H20-8 cm H20] 8 cm H20  MAP (cm H2O):  [8.5-12] 8.5    Physical Exam: Sedated white male on mechanical ventilator    General Appearance:       Head:    Normocephalic, without obvious abnormality, atraumatic   Eyes:            Lids and lashes normal, conjunctivae and sclerae normal, no   icterus, no pallor, corneas clear, PERRLA   Ears:    Ears appear intact with no abnormalities noted   Throat:   No oral lesions, no thrush, oral mucosa moist   Neck:   No adenopathy, supple, trachea midline, no thyromegaly, no   carotid bruit, no JVD   Back:     No kyphosis present, no scoliosis present, no skin lesions,      erythema or scars, no tenderness to percussion or                   palpation,   range of motion normal   Lungs:    On IMV of 10 his respiratory rate was 20    Heart:    Regular rhythm and normal rate, normal S1 and S2, no            murmur, no gallop, no rub, no click   Chest Wall:    No abnormalities observed   Abdomen:     Normal bowel sounds, no masses, no organomegaly, soft        non-tender, non-distended, no guarding, no rebound                tenderness   Rectal:     " Deferred   Extremities:   Moves all extremities well, no edema, no cyanosis, no             redness   Pulses:   Pulses palpable and equal bilaterally   Skin:   No bleeding, bruising or rash   Lymph nodes:   No palpable adenopathy   Neurologic:   Cranial nerves 2 - 12 grossly intact, sensation intact, DTR       present and equal bilaterally     LAB:    pH, Arterial   Date Value Ref Range Status   04/16/2019 7.410 7.350 - 7.450 pH units Final     pCO2, Arterial   Date Value Ref Range Status   04/16/2019 38.5 35.0 - 45.0 mm Hg Final     pO2, Arterial   Date Value Ref Range Status   04/16/2019 70.9 (L) 83.0 - 108.0 mm Hg Final     Comment:     84 Value below reference range     Lab Results   Component Value Date    WBC 6.84 04/16/2019    HGB 14.2 04/16/2019    HCT 42.3 04/16/2019    MCV 94.0 04/16/2019     04/16/2019     Lab Results   Component Value Date    GLUCOSE 98 04/16/2019    BUN 9 04/16/2019    CREATININE 0.62 (L) 04/16/2019    EGFRIFNONA 143 04/16/2019    BCR 14.5 04/16/2019    CO2 22.0 04/16/2019    CALCIUM 8.5 (L) 04/16/2019    ALBUMIN 3.20 (L) 04/16/2019    AST 53 (H) 04/16/2019    ALT 56 (H) 04/16/2019         RAD:   Imaging Results (last 24 hours)     Procedure Component Value Units Date/Time     Liver [[id number]] Updated:  04/15/19 1621         Impression      Active Hospital Problems    Diagnosis   • **Accidental drug overdose   • Amphetamine abuse (CMS/[facility])            Plan        Assessment accidental drug OD with polypharmacy, agitation, persistent obtundation    Plan IMV of 10 pressure support of 15, reduce sedation.  Patient will not be able to be extubated until he is fully awake.  I have asked the nursing staff to begin reducing sedation to        This document has been produced with the assistance of Dragon dictation  This document has been electronically signed by Devonte Lopez MD on April 16, 2019 7:46 AM       I discussed the patient's findings and my recommendations with patient  and nursing staff

## 2019-04-16 NOTE — PLAN OF CARE
Problem: Patient Care Overview  Goal: Plan of Care Review  Outcome: Ongoing (interventions implemented as appropriate)   04/16/19 0515   OTHER   Outcome Summary Have been weaning sedation off throughout the night (see MAR) to prepare for SBT this AM. Pt not following commands yet. Tolerating TF, which is at 30 ml/hr at this time.    Plan of Care Review   Progress no change       Problem: Ventilation, Mechanical Invasive (Adult)  Goal: Signs and Symptoms of Listed Potential Problems Will be Absent, Minimized or Managed (Ventilation, Mechanical Invasive)  Outcome: Ongoing (interventions implemented as appropriate)      Problem: Restraint, Nonbehavioral (Nonviolent)  Goal: Rationale and Justification  Outcome: Ongoing (interventions implemented as appropriate)    Goal: Nonbehavioral (Nonviolent) Restraint: Absence of Injury/Harm  Outcome: Ongoing (interventions implemented as appropriate)    Goal: Nonbehavioral (Nonviolent) Restraint: Achievement of Discontinuation Criteria  Outcome: Ongoing (interventions implemented as appropriate)    Goal: Nonbehavioral (Nonviolent) Restraint: Preservation of Dignity and Wellbeing  Outcome: Ongoing (interventions implemented as appropriate)      Problem: Fall Risk (Adult)  Goal: Identify Related Risk Factors and Signs and Symptoms  Outcome: Outcome(s) achieved Date Met: 04/16/19    Goal: Absence of Fall  Outcome: Ongoing (interventions implemented as appropriate)      Problem: Overdose, Ingestion/Inhalants (Adult)  Goal: Signs and Symptoms of Listed Potential Problems Will be Absent, Minimized or Managed (Overdose, Ingestion/Inhalants)  Outcome: Ongoing (interventions implemented as appropriate)      Problem: Skin Injury Risk (Adult)  Goal: Skin Health and Integrity  Outcome: Ongoing (interventions implemented as appropriate)      Problem: Pain, Acute (Adult)  Goal: Identify Related Risk Factors and Signs and Symptoms  Outcome: Ongoing (interventions implemented as  appropriate)    Goal: Acceptable Pain Control/Comfort Level  Outcome: Ongoing (interventions implemented as appropriate)

## 2019-04-16 NOTE — PLAN OF CARE
Problem: Ventilation, Mechanical Invasive (Adult)  Goal: Signs and Symptoms of Listed Potential Problems Will be Absent, Minimized or Managed (Ventilation, Mechanical Invasive)  Outcome: Outcome(s) achieved Date Met: 04/16/19  Dr. Lopez plans to extubate in am

## 2019-04-16 NOTE — CONSULTS
Adult Nutrition  Assessment    Patient Name:  Marko Walters  YOB: 1975  MRN: [id number]  Admit Date:  4/13/2019    Assessment Date:  4/16/2019    Comments:  The patient was admitted d/t an accidental drug overdose and was intubated, ventilated, and sedated. The patient is currently receiving TF with a goal rate of 55 mL/hr. The TF is currently at 30 mL/hr with propofol at a rate of 13.7. The patient is tolerating the TF well, and the TF will continue to be advanced per the order. I will continue to monitor TF tolerance, advancement, and make adjustments as necessary.     Reason for Assessment     Row Name 04/16/19 165          Reason for Assessment    Reason For Assessment  follow-up protocol  (Pended)      Identified At Risk by Screening Criteria  tube feeding or parenteral nutrition  (Pended)          Nutrition/Diet History     Row Name 04/16/19 1657          Nutrition/Diet History    Typical Food/Fluid Intake  Patient intubated and sedated. on TF  (Pended)            Labs/Tests/Procedures/Meds     Row Name 04/16/19 1652          Labs/Procedures/Meds    Lab Results Reviewed  reviewed, pertinent  (Pended)      Lab Results Comments  Alb-3.2  (Pended)         Diagnostic Tests/Procedures    Diagnostic Test/Procedure Reviewed  reviewed  (Pended)         Medications    Pertinent Medications Reviewed  reviewed, pertinent  (Pended)      Pertinent Medications Comments  pepcid, versed, propofol-13.7  (Pended)          Physical Findings     Row Name 04/16/19 1655          Physical Findings    Overall Physical Appearance  on ventilator support  (Pended)      Tubes  nasogastric tube  (Pended)          Estimated/Assessed Needs     Row Name 04/16/19 7501          Calculation Measurements    Weight Used For Calculations  92.4 kg (203 lb 11.3 oz)  (Pended)         KCAL/KG    14 Kcal/Kg (kcal)  1293.6  (Pended)      15 Kcal/Kg (kcal)  1386  (Pended)      18 Kcal/Kg (kcal)  1663.2  (Pended)      20 Kcal/Kg  (kcal)  1848  (Pended)      25 Kcal/Kg (kcal)  2310  (Pended)      30 Kcal/Kg (kcal)  2772  (Pended)      35 Kcal/Kg (kcal)  3234  (Pended)      40 Kcal/Kg (kcal)  3696  (Pended)      45 Kcal/Kg (kcal)  4158  (Pended)      50 Kcal/Kg (kcal)  4620  (Pended)         Comstock-St. Jeor Equation    RMR (Comstock-St. Jeor Equation)  1835.25  (Pended)         Fluid Requirements    Ronda-Henry Method (over 20 kg)  3348  (Pended)          Nutrition Prescription Ordered     Row Name 04/16/19 1655          Nutrition Prescription PO    Current PO Diet  NPO  (Pended)         Nutrition Prescription EN    Enteral Route  NG  (Pended)      Product  Isosource HN (Osmolite 1.2)  (Pended)      TF Delivery Method  Continuous  (Pended)      Continuous TF Goal Rate (mL/hr)  55 mL/hr  (Pended)      Continuous TF Current Rate (mL/hr)  30 mL/hr  (Pended)      Continuous TF Goal Volume (mL)  1320 mL  (Pended)      Continuous TF Current Volume (mL)  720 mL  (Pended)      Water flush (mL)   25 mL  (Pended)      Water Flush Frequency  Per hour  (Pended)         Propofol Considerations    Propofol (mL/hr)  13.7 mL/hr  (Pended)      Propofol (Kcal/day)  362 Kcal/day  (Pended)          Evaluation of Received Nutrient/Fluid Intake     Row Name 04/16/19 9868          Calculation Measurements    Weight Used For Calculations  92.4 kg (203 lb 11.3 oz)  (Pended)         Calories Evaluation    Enteral Calories (kcal)  864  (Pended)      Other Calories (kcal)  362  (Pended)      Total Calories (kcal)  1226  (Pended)      % of Kcal Needs  53  (Pended)         Protein Evaluation    Enteral Protein (gm)  39  (Pended)      Total Protein (gm)  39  (Pended)      % of Protein Needs  53  (Pended)         Intake Assessment    Energy/Calorie Requirement Assessment  not meeting needs  (Pended)      Protein Requirement Assessment  not meeting needs  (Pended)      Fluid Requirement Assessment  meeting needs  (Pended)      Average Calorie Intake (days)  2  (Pended)       Average Protein Intake (days)  2  (Pended)      Tolerance  tolerating  (Pended)          Evaluation of Prescribed Nutrient/Fluid Intake     Row Name 04/16/19 7683          Calculation Measurements    Weight Used For Calculations  92.4 kg (203 lb 11.3 oz)  (Pended)              Electronically signed by:  Lydia Villa  04/16/19 4:57 PM

## 2019-04-16 NOTE — PAYOR COMM NOTE
"Marko Lauren (43 y.o. Male)     Date of Birth Social Security Number Address Home Phone MRN    1975  [address] [facility] [address] [phone] [id number]    Orthodoxy Marital Status          Presybeterian        Admission Date Admission Type Admitting Provider Attending Provider Department, Room/Bed    4/13/19 Emergency Andrea Chappell MD Shope, Adam M, MD [facility], 05/A    Discharge Date Discharge Disposition Discharge Destination                       Attending Provider:  Andrea Chappell MD    Allergies:  Penicillins    Isolation:  None   Infection:  None   Code Status:  CPR    Ht:  177.8 cm (70\")   Wt:  92.4 kg (203 lb 11.3 oz)    Admission Cmt:  None   Principal Problem:  Accidental drug overdose [T50.901A]                 Active Insurance as of 4/13/2019     Primary Coverage     Payor Plan Insurance Group Employer/Plan Group    [employer] MEDICAID [employer] [id number]     Payor Plan Address Payor Plan Phone Number Payor Plan Fax Number Effective Dates    PO  [phone]  2/20/2017 - None Entered    [facility] [id number]       Subscriber Name Subscriber Birth Date Member ID       MARKO LAUREN 1975 [id number]                 Emergency Contacts      (Rel.) Home Phone Work Phone Mobile Phone    RomeoLianne (Spouse) [phone] -- [phone]    Cullen Garner (Brother) -- -- [phone]    Christi Tello (Sister) -- -- [phone]            ICU Vital Signs     Row Name 04/16/19 0920 04/16/19 0915 04/16/19 0900 04/16/19 0830 04/16/19 0645       Vitals    Pulse  --  100  100  --  91    Heart Rate Source  --  Monitor  Monitor  --  Monitor    Resp  --  21  --  --  --    Resp Rate Source  --  Ventilator  --  --  --    Resp Rate (Observed) Vent  26  --  --  24  31       Oxygen Therapy    SpO2  --  97 %  96 %  --  94 %    Pulse Oximetry Type  --  Continuous  Continuous  --  Continuous    Device (Oxygen Therapy)  " --  ventilator  ventilator  --  ventilator    Oxygen Concentration (%)  --  30  30  --  30    Row Name 04/16/19 0630 04/16/19 0605 04/16/19 0544 04/16/19 0530 04/16/19 0501       Height and Weight    Weight  --  --  92.4 kg (203 lb 11.3 oz)  --  --    Weight Method  --  --  Bed scale  --  --       Vitals    Core (Body) Temperature  99.1 °F (37.3 °C)  99.5 °F (37.5 °C)  --  99.9 °F (37.7 °C)  100 °F (37.8 °C)    Pulse  94  97  --  100  104    Resp Rate (Observed) Vent  20  20  --  20  20    BP  121/74  128/64  --  139/69  136/68    Noninvasive MAP (mmHg)  93  90  --  98  95       Oxygen Therapy    SpO2  94 %  93 %  --  94 %  93 %    Row Name 04/16/19 0457 04/16/19 0430 04/16/19 0402 04/16/19 0350 04/16/19 0332       Vitals    Core (Body) Temperature  --  100 °F (37.8 °C)  100.2 °F (37.9 °C)  --  100 °F (37.8 °C)    Pulse  101  105  105  --  105    Heart Rate Source  Monitor  --  --  --  --    Resp  21  --  --  --  --    Resp Rate Source  Visual  --  --  --  --    Resp Rate (Observed) Vent  21  22  21  --  22    BP  --  140/73  146/68  --  139/72    Noninvasive MAP (mmHg)  --  100  98  --  99       Oxygen Therapy    SpO2  93 %  94 %  94 %  --  96 %    Pulse Oximetry Type  Continuous  --  --  --  --    Device (Oxygen Therapy)  ventilator  --  --  ventilator  --    Oxygen Concentration (%)  30  --  --  30  --    Row Name 04/16/19 0308 04/16/19 0300 04/16/19 0200 04/16/19 0130 04/16/19 0108       Vitals    Core (Body) Temperature  --  99.9 °F (37.7 °C)  99.5 °F (37.5 °C)  99.3 °F (37.4 °C)  --    Pulse  104  104  100  99  98    Heart Rate Source  Monitor  --  --  --  Monitor    Resp  22  --  --  --  20    Resp Rate Source  Visual  --  --  --  Visual    Resp Rate (Observed) Vent  22  22  21  20  20    BP  --  135/65  146/68  134/63  --    Noninvasive MAP (mmHg)  --  94  98  91  --       Oxygen Therapy    SpO2  95 %  96 %  95 %  95 %  95 %    Pulse Oximetry Type  Continuous  --  --  --  Continuous    Device (Oxygen  Therapy)  ventilator  --  --  --  ventilator    Oxygen Concentration (%)  30  --  --  --  30    Row Name 04/16/19 0100 04/16/19 0030 04/16/19 0000 04/15/19 2351 04/15/19 2330       Vitals    Core (Body) Temperature  99.1 °F (37.3 °C)  99 °F (37.2 °C)  98.8 °F (37.1 °C)  --  98.6 °F (37 °C)    Pulse  97  94  93  --  92    Resp Rate Source  --  --  Ventilator  --  --    Resp Rate (Observed) Vent  20  20  20  --  20    BP  131/66  138/74  131/67  --  139/65    Noninvasive MAP (mmHg)  92  100  93  --  93       Oxygen Therapy    SpO2  95 %  97 %  95 %  --  96 %    Device (Oxygen Therapy)  --  --  --  ventilator  --    Oxygen Concentration (%)  --  --  --  30  --    Row Name 04/15/19 2301 04/15/19 2300 04/15/19 2230 04/15/19 2202 04/15/19 2130       Vitals    Core (Body) Temperature  --  98.2 °F (36.8 °C)  98.2 °F (36.8 °C)  97.9 °F (36.6 °C)  97.5 °F (36.4 °C)    Pulse  91  92  92  89  86    Heart Rate Source  Monitor  --  --  --  --    Resp  20  --  --  --  --    Resp Rate Source  Visual  --  --  --  --    Resp Rate (Observed) Vent  20  20  20  21  20    BP  --  123/57  119/62  125/61  139/80    Noninvasive MAP (mmHg)  --  82  85  87  104       Oxygen Therapy    SpO2  95 %  95 %  94 %  95 %  100 %    Pulse Oximetry Type  Continuous  --  --  --  --    Device (Oxygen Therapy)  ventilator  --  --  --  --    Oxygen Concentration (%)  30  --  --  --  --    Row Name 04/15/19 2101 04/15/19 2100 04/15/19 2030 04/15/19 2000 04/15/19 1930       Vitals    Core (Body) Temperature  --  97.3 °F (36.3 °C)  97 °F (36.1 °C)  96.8 °F (36 °C)  96.4 °F (35.8 °C)    Pulse  84  83  80  80  79    Heart Rate Source  Monitor  --  --  --  --    Resp  20  --  --  --  --    Resp Rate Source  Visual  --  --  --  --    Resp Rate (Observed) Vent  20  20  20  20  20    BP  --  145/79  149/87  147/86  143/82    Noninvasive MAP (mmHg)  --  105  113  111  106       Oxygen Therapy    SpO2  --  100 %  100 %  100 %  100 %    Pulse Oximetry Type  Continuous   --  --  --  --    Device (Oxygen Therapy)  ventilator  --  --  ventilator  --    Oxygen Concentration (%)  30  --  --  30  --    Row Name 04/15/19 1910 04/15/19 1900 04/15/19 1821 04/15/19 1801 04/15/19 1738       Vitals    Core (Body) Temperature  --  96.1 °F (35.6 °C)  95.5 °F (35.3 °C)  --  --    Pulse  78  76  74  --  73    Heart Rate Source  Monitor  Monitor  --  --  Monitor    Resp  20  --  --  --  --    Resp Rate Source  Visual  Ventilator  --  --  --    Resp Rate (Observed) Vent  20  20  20  --  20    BP  --  133/80  --  138/85  --    Noninvasive MAP (mmHg)  --  102  --  106  --    BP Location  --  Right leg  --  --  --    BP Method  --  Automatic  --  --  --    Patient Position  --  Lying  --  --  --       Oxygen Therapy    SpO2  100 %  100 %  100 %  --  100 %    Pulse Oximetry Type  Continuous  Continuous  --  --  Continuous    Device (Oxygen Therapy)  ventilator  ventilator  --  --  ventilator    Oxygen Concentration (%)  30  30  --  --  30    Row Name 04/15/19 1733 04/15/19 1722 04/15/19 1638 04/15/19 1632 04/15/19 1534       Vitals    Core (Body) Temperature  --  95.2 °F (35.1 °C)  95.4 °F (35.2 °C)  --  95.4 °F (35.2 °C)    Pulse  --  73  74  --  --    Resp Rate (Observed) Vent  --  20  20  --  --    BP  134/82  --  --  121/72  --    Noninvasive MAP (mmHg)  104  --  --  92  --       Oxygen Therapy    SpO2  --  100 %  98 %  --  --    Row Name 04/15/19 1532 04/15/19 1515 04/15/19 1500 04/15/19 1431 04/15/19 1400       Vitals    Core (Body) Temperature  --  --  95.4 °F (35.2 °C)  --  95.2 °F (35.1 °C)    Pulse  --  75  75  --  --    Resp  --  20  --  --  --    Resp Rate (Observed) Vent  --  20  20  --  --    BP  121/69  --  --  125/68  --    Noninvasive MAP (mmHg)  89  --  --  90  --       Oxygen Therapy    SpO2  --  98 %  98 %  --  --    Pulse Oximetry Type  --  Continuous  --  --  --    Device (Oxygen Therapy)  --  ventilator  --  --  --    Oxygen Concentration (%)  --  30  --  --  --    Row Name  04/15/19 1308 04/15/19 1240 04/15/19 1231 04/15/19 1200 04/15/19 1132       Vitals    Core (Body) Temperature  --  95 °F (35 °C)  --  94.8 °F (34.9 °C)  --    Pulse  74  75  --  --  --    Heart Rate Source  Monitor  --  --  --  --    Resp  20  --  --  --  --    Resp Rate Source  Ventilator  --  --  --  --    Resp Rate (Observed) Vent  20  20  --  --  --    BP  127/76  --  135/80  --  143/81    Noninvasive MAP (mmHg)  95  --  99  --  106       Oxygen Therapy    SpO2  98 %  98 %  --  --  --    Pulse Oximetry Type  Continuous  --  --  --  --    Device (Oxygen Therapy)  ventilator  --  --  --  --    Oxygen Concentration (%)  30  --  --  --  --    Row Name 04/15/19 1101 04/15/19 1100 04/15/19 1031             Vitals    Core (Body) Temperature  --  94.5 °F (34.7 °C)  --      Pulse  70  69  --      Heart Rate Source  Monitor  --  --      Resp  20  --  --      Resp Rate Source  Ventilator  --  --      Resp Rate (Observed) Vent  20  20  --      BP  --  --  149/91      Noninvasive MAP (mmHg)  --  --  114         Oxygen Therapy    SpO2  100 %  100 %  --      Pulse Oximetry Type  Continuous  --  --      Device (Oxygen Therapy)  ventilator  --  --      Oxygen Concentration (%)  30  --  --          Hospital Medications (active)       Dose Frequency Start End    8-hydroxyquinoline sulfate (BAG BALM) ointment  Daily 4/15/2019     Sig - Route: Apply  topically Daily. - Apply externally    acetaminophen (TYLENOL) tablet 650 mg 650 mg Every 6 Hours PRN 4/14/2019     Sig - Route: Take 2 tablets by mouth Every 6 (Six) Hours As Needed for Mild Pain . - Oral    albuterol (PROVENTIL) nebulizer solution 0.083% 2.5 mg/3mL 2.5 mg Every 4 Hours PRN 4/14/2019     Sig - Route: Take 2.5 mg by nebulization Every 4 (Four) Hours As Needed for Wheezing. - Nebulization    bacitracin ointment  Daily 4/15/2019     Sig - Route: Apply  topically to the appropriate area as directed Daily. - Topical    chlorhexidine (PERIDEX) 0.12 % solution 15 mL 15 mL  Every 12 Hours Scheduled 4/14/2019     Sig - Route: Apply 15 mL to the mouth or throat Every 12 (Twelve) Hours. - Mouth/Throat    dextrose (D50W) 25 g/ 50mL Intravenous Solution 50 mL 50 mL Every 1 Hour PRN 4/13/2019     Sig - Route: Infuse 50 mL into a venous catheter Every 1 (One) Hour As Needed for Low Blood Sugar. - Intravenous    enoxaparin (LOVENOX) syringe 40 mg 40 mg Every 24 Hours 4/14/2019     Sig - Route: Inject 0.4 mL under the skin into the appropriate area as directed Daily. - Subcutaneous    famotidine (PEPCID) injection 20 mg 20 mg Every 12 Hours Scheduled 4/14/2019     Sig - Route: Infuse 2 mL into a venous catheter Every 12 (Twelve) Hours. - Intravenous    fentaNYL citrate (PF) (SUBLIMAZE) injection 150 mcg 150 mcg Once 4/14/2019     Sig - Route: Infuse 3 mL into a venous catheter 1 (One) Time. - Intravenous    fentaNYL citrate (PF) (SUBLIMAZE) injection 25 mcg 25 mcg Every 1 Hour PRN 4/15/2019 4/24/2019    Sig - Route: Infuse 0.5 mL into a venous catheter Every 1 (One) Hour As Needed for Severe Pain  (aggitation). - Intravenous    lactated ringers infusion 75 mL/hr Continuous 4/14/2019     Sig - Route: Infuse 75 mL/hr into a venous catheter Continuous. - Intravenous    LORazepam (ATIVAN) injection 2 mg 2 mg Every 30 Minutes PRN 4/14/2019 4/24/2019    Sig - Route: Infuse 1 mL into a venous catheter Every 30 (Thirty) Minutes As Needed (for non-severe anxiety/agitation). - Intravenous    midazolam (VERSED) 50 mg in sodium chloride 0.9 % 100 mL (0.5 mg/mL) infusion 1-10 mg/hr Titrated 4/14/2019     Sig - Route: Infuse 1-10 mg/hr into a venous catheter Dose Adjusted By Provider As Needed. - Intravenous    midazolam (VERSED) injection 10 mg 10 mg Once 4/14/2019     Sig - Route: Infuse 2 mL into a venous catheter 1 (One) Time. - Intravenous    nicotine (NICODERM CQ) 21 MG/24HR patch 1 patch 1 patch Every 24 Hours Scheduled 4/14/2019     Sig - Route: Place 1 patch on the skin as directed by provider  Daily. - Transdermal    PHENobarbital injection 200 mg 200 mg Every 4 Hours PRN 4/14/2019 5/14/2019    Sig - Route: Infuse 1.54 mL into a venous catheter Every 4 (Four) Hours As Needed (aggression). - Intravenous    propofol (DIPRIVAN) infusion 10 mg/mL 100 mL 5-50 mcg/kg/min × 91.4 kg Titrated 4/13/2019     Sig - Route: Infuse 457-4,570 mcg/min into a venous catheter Dose Adjusted By Provider As Needed. - Intravenous    sodium chloride 0.9 % flush 10 mL 10 mL As Needed 4/13/2019     Sig - Route: Infuse 10 mL into a venous catheter As Needed for Line Care. - Intravenous    Cosign for Ordering: Accepted by Lobo Lieberman MD on 4/14/2019  6:51 PM    sodium chloride 0.9 % flush 10 mL 10 mL Every 12 Hours Scheduled 4/15/2019     Sig - Route: Infuse 10 mL into a venous catheter Every 12 (Twelve) Hours. - Intravenous    sodium chloride 0.9 % flush 10 mL 10 mL Every 12 Hours Scheduled 4/15/2019     Sig - Route: Infuse 10 mL into a venous catheter Every 12 (Twelve) Hours. - Intravenous    sodium chloride 0.9 % flush 10 mL 10 mL Every 12 Hours Scheduled 4/15/2019     Sig - Route: Infuse 10 mL into a venous catheter Every 12 (Twelve) Hours. - Intravenous    sodium chloride 0.9 % flush 10 mL 10 mL As Needed 4/15/2019     Sig - Route: Infuse 10 mL into a venous catheter As Needed for Line Care (After Medication Administration). - Intravenous    sodium chloride 0.9 % flush 20 mL 20 mL As Needed 4/15/2019     Sig - Route: Infuse 20 mL into a venous catheter As Needed for Line Care (After Blood Draws or Blood Product Administration). - Intravenous    sodium chloride 0.9 % flush 3 mL 3 mL Every 12 Hours Scheduled 4/14/2019     Sig - Route: Infuse 3 mL into a venous catheter Every 12 (Twelve) Hours. - Intravenous    sodium chloride 0.9 % flush 3-10 mL 3-10 mL As Needed 4/14/2019     Sig - Route: Infuse 3-10 mL into a venous catheter As Needed for Line Care. - Intravenous    fentaNYL citrate (PF) (SUBLIMAZE) injection 100 mcg  (Discontinued) 100 mcg Every 1 Hour PRN 4/14/2019 4/15/2019    Sig - Route: Infuse 2 mL into a venous catheter Every 1 (One) Hour As Needed for Severe Pain  (aggitation). - Intravenous          Lab Results (most recent)     Procedure Component Value Units Date/Time    Blood Gas, Arterial [[id number]]  (Abnormal) Collected:  04/16/19 0441    Specimen:  Arterial Blood Updated:  04/16/19 0457     Site Left Radial     Raj's Test Positive     pH, Arterial 7.410 pH units      pCO2, Arterial 38.5 mm Hg      pO2, Arterial 70.9 mm Hg      Comment: 84 Value below reference range        HCO3, Arterial 24.3 mmol/L      Base Excess, Arterial -0.1 mmol/L      Comment: 84 Value below reference range        O2 Saturation, Arterial 94.8 %      Barometric Pressure for Blood Gas 750 mmHg      Modality Ventilator     FIO2 30 %      Ventilator Mode AC     Set Tidal Volume 500     Set Mech Resp Rate 20.0     PEEP 5.0     Collected by BS     Comment: Meter: [id number]     :  [id number]       Comprehensive Metabolic Panel [[id number]]  (Abnormal) Collected:  04/16/19 0332    Specimen:  Blood Updated:  04/16/19 0411     Glucose 98 mg/dL      BUN 9 mg/dL      Creatinine 0.62 mg/dL      Sodium 138 mmol/L      Potassium 3.9 mmol/L      Chloride 104 mmol/L      CO2 22.0 mmol/L      Calcium 8.5 mg/dL      Total Protein 6.2 g/dL      Albumin 3.20 g/dL      ALT (SGPT) 56 U/L      AST (SGOT) 53 U/L      Alkaline Phosphatase 72 U/L      Total Bilirubin 0.4 mg/dL      eGFR Non African Amer 143 mL/min/1.73      Globulin 3.0 gm/dL      A/G Ratio 1.1 g/dL      BUN/Creatinine Ratio 14.5     Anion Gap 12.0 mmol/L     Narrative:       GFR Normal >60  Chronic Kidney Disease <60  Kidney Failure <15    CBC & Differential [[id number]] Collected:  04/16/19 0332    Specimen:  Blood Updated:  04/16/19 0343    Narrative:       The following orders were created for panel order CBC & Differential.  Procedure                               Abnormality          Status                     ---------                               -----------         ------                     CBC Auto Differential[[id number]]        Abnormal            Final result                 Please view results for these tests on the individual orders.    CBC Auto Differential [[id number]]  (Abnormal) Collected:  04/16/19 0332    Specimen:  Blood Updated:  04/16/19 0343     WBC 6.84 10*3/mm3      RBC 4.50 10*6/mm3      Hemoglobin 14.2 g/dL      Hematocrit 42.3 %      MCV 94.0 fL      MCH 31.6 pg      MCHC 33.6 g/dL      RDW 13.0 %      RDW-SD 44.5 fl      MPV 9.4 fL      Platelets 242 10*3/mm3      Neutrophil % 70.2 %      Lymphocyte % 18.4 %      Monocyte % 10.1 %      Eosinophil % 0.9 %      Basophil % 0.3 %      Immature Grans % 0.1 %      Neutrophils, Absolute 4.80 10*3/mm3      Lymphocytes, Absolute 1.26 10*3/mm3      Monocytes, Absolute 0.69 10*3/mm3      Eosinophils, Absolute 0.06 10*3/mm3      Basophils, Absolute 0.02 10*3/mm3      Immature Grans, Absolute 0.01 10*3/mm3      nRBC 0.0 /100 WBC     POC Glucose Once [[id number]]  (Normal) Collected:  04/16/19 0025    Specimen:  Blood Updated:  04/16/19 0102     Glucose 74 mg/dL      Comment: : [id number] NUÑEZ MICHELLEMeter ID: [id number]       Blood Culture - Blood, Arm, Left [[id number]] Collected:  04/13/19 2333    Specimen:  Blood from Arm, Left Updated:  04/16/19 0025     Blood Culture No growth at 2 days    Blood Culture - Blood, Arm, Left [[id number]] Collected:  04/13/19 2333    Specimen:  Blood from Arm, Left Updated:  04/16/19 0025     Blood Culture No growth at 2 days    Hepatitis Panel, Acute [[id number]]  (Normal) Collected:  04/15/19 1212    Specimen:  Blood Updated:  04/15/19 2141     Hepatitis B Surface Ag Non-Reactive     Hep A IgM Non-Reactive     Hep B C IgM Non-Reactive     Hepatitis C Ab Non-Reactive    CK [[id number]]  (Abnormal) Collected:  04/15/19 1212    Specimen:  Blood Updated:  04/15/19 2134     Creatine Kinase 833  U/L     POC Glucose Once [[id number]]  (Normal) Collected:  04/15/19 1254    Specimen:  Blood Updated:  04/15/19 1306     Glucose 74 mg/dL      Comment: : [id number] MARIO Howell ID: [id number]       CBC & Differential [[id number]] Collected:  04/15/19 0540    Specimen:  Blood Updated:  04/15/19 0653    Narrative:       The following orders were created for panel order CBC & Differential.  Procedure                               Abnormality         Status                     ---------                               -----------         ------                     Scan Slide[[id number]]                                                                  CBC Auto Differential[[id number]]        Abnormal            Final result                 Please view results for these tests on the individual orders.    CBC Auto Differential [[id number]]  (Abnormal) Collected:  04/15/19 0540    Specimen:  Blood Updated:  04/15/19 0625     WBC 8.54 10*3/mm3      RBC 4.47 10*6/mm3      Hemoglobin 14.2 g/dL      Hematocrit 41.9 %      MCV 93.7 fL      MCH 31.8 pg      MCHC 33.9 g/dL      RDW 12.7 %      RDW-SD 43.7 fl      MPV 10.1 fL      Platelets 168 10*3/mm3      Neutrophil % 76.0 %      Lymphocyte % 12.9 %      Monocyte % 9.3 %      Eosinophil % 1.2 %      Basophil % 0.4 %      Immature Grans % 0.2 %      Neutrophils, Absolute 6.50 10*3/mm3      Lymphocytes, Absolute 1.10 10*3/mm3      Monocytes, Absolute 0.79 10*3/mm3      Eosinophils, Absolute 0.10 10*3/mm3      Basophils, Absolute 0.03 10*3/mm3      Immature Grans, Absolute 0.02 10*3/mm3      nRBC 0.0 /100 WBC     Basic Metabolic Panel [[id number]]  (Abnormal) Collected:  04/15/19 0540    Specimen:  Blood Updated:  04/15/19 0620     Glucose 76 mg/dL      BUN 10 mg/dL      Creatinine 0.61 mg/dL      Sodium 138 mmol/L      Potassium 4.5 mmol/L      Chloride 101 mmol/L      CO2 21.0 mmol/L      Calcium 8.5 mg/dL      eGFR Non African Amer 146 mL/min/1.73      BUN/Creatinine  Ratio 16.4     Anion Gap 16.0 mmol/L     Narrative:       GFR Normal >60  Chronic Kidney Disease <60  Kidney Failure <15    Blood Gas, Arterial [[id number]]  (Abnormal) Collected:  04/15/19 0441    Specimen:  Arterial Blood Updated:  04/15/19 0453     Site Left Radial     Raj's Test N/A     pH, Arterial 7.390 pH units      pCO2, Arterial 39.6 mm Hg      pO2, Arterial 76.3 mm Hg      Comment: 84 Value below reference range        HCO3, Arterial 24.0 mmol/L      Base Excess, Arterial -0.9 mmol/L      Comment: 84 Value below reference range        O2 Saturation, Arterial 95.8 %      Barometric Pressure for Blood Gas 748 mmHg      Modality Ventilator     Flow Rate 30.0 lpm      Ventilator Mode AC     Set Tidal Volume 500     Set Mech Resp Rate 20.0     PEEP 5.0     Collected by RT     Comment: Meter: [id number]     :  [id number]       Basic Metabolic Panel [[id number]]  (Abnormal) Collected:  04/14/19 0348    Specimen:  Blood Updated:  04/14/19 0448     Glucose 93 mg/dL      BUN 23 mg/dL      Creatinine 0.78 mg/dL      Sodium 144 mmol/L      Potassium 4.3 mmol/L      Chloride 110 mmol/L      CO2 20.0 mmol/L      Calcium 8.2 mg/dL      eGFR Non African Amer 110 mL/min/1.73      BUN/Creatinine Ratio 29.5     Anion Gap 14.0 mmol/L     Narrative:       GFR Normal >60  Chronic Kidney Disease <60  Kidney Failure <15    Magnesium [[id number]]  (Normal) Collected:  04/14/19 0348    Specimen:  Blood Updated:  04/14/19 0439     Magnesium 2.5 mg/dL     CBC (No Diff) [[id number]]  (Abnormal) Collected:  04/14/19 0348    Specimen:  Blood Updated:  04/14/19 0408     WBC 12.99 10*3/mm3      RBC 4.50 10*6/mm3      Hemoglobin 14.1 g/dL      Hematocrit 41.4 %      MCV 92.0 fL      MCH 31.3 pg      MCHC 34.1 g/dL      RDW 12.6 %      RDW-SD 42.0 fl      MPV 9.0 fL      Platelets 237 10*3/mm3     Calcium, Ionized [[id number]]  (Abnormal) Collected:  04/14/19 0348    Specimen:  Blood Updated:  04/14/19 0405     Ionized Calcium 4.28  mg/dL     Lactic Acid, Plasma [[id number]]  (Normal) Collected:  04/13/19 2333    Specimen:  Blood Updated:  04/14/19 0015     Lactate 1.0 mmol/L     [facility] Draw [[id number]] Collected:  04/13/19 1847    Specimen:  Blood Updated:  04/13/19 2000    Narrative:       The following orders were created for panel order [facility] Draw.  Procedure                               Abnormality         Status                     ---------                               -----------         ------                     Light Blue Top[[id number]]                                   Final result               Green Top (Gel)[[id number]]                                  Final result               Lavender Top[[id number]]                                     Final result               Gold Top - SST[[id number]]                                   Final result                 Please view results for these tests on the individual orders.    Light Blue Top [[id number]] Collected:  04/13/19 1847    Specimen:  Blood Updated:  04/13/19 2000     Extra Tube hold for add-on     Comment: Auto resulted       Green Top (Gel) [[id number]] Collected:  04/13/19 1847    Specimen:  Blood Updated:  04/13/19 2000     Extra Tube Hold for add-ons.     Comment: Auto resulted.       Lavender Top [[id number]] Collected:  04/13/19 1847    Specimen:  Blood Updated:  04/13/19 2000     Extra Tube hold for add-on     Comment: Auto resulted       Gold Top - SST [[id number]] Collected:  04/13/19 1847    Specimen:  Blood Updated:  04/13/19 2000     Extra Tube Hold for add-ons.     Comment: Auto resulted.       Urinalysis, Microscopic Only - Urine, Catheter [[id number]]  (Abnormal) Collected:  04/13/19 1858    Specimen:  Urine, Catheter Updated:  04/13/19 1928     RBC, UA 13-20 /HPF      WBC, UA 0-2 /HPF      Bacteria, UA None Seen /HPF      Squamous Epithelial Cells, UA None Seen /HPF      Hyaline Casts, UA None Seen /LPF      Sperm, UA Large/3+ /HPF      Methodology Manual Light  Microscopy    Urine Drug Screen - Urine, Clean Catch [[id number]]  (Abnormal) Collected:  04/13/19 1858    Specimen:  Urine, Clean Catch Updated:  04/13/19 1924     Amphet/Methamphet, Screen Positive     Barbiturates Screen, Urine Negative     Benzodiazepine Screen, Urine Negative     Cocaine Screen, Urine Negative     Opiate Screen Positive     THC, Screen, Urine Negative     Methadone Screen, Urine Negative     Oxycodone Screen, Urine Negative    Narrative:       Negative Thresholds For Drugs Screened:     Amphetamines               500 ng/ml   Barbiturates               200 ng/ml   Benzodiazepines            100 ng/ml   Cocaine                    300 ng/ml   Methadone                  300 ng/ml   Opiates                    300 ng/ml   Oxycodone                  100 ng/ml   THC                        50 ng/ml    The Normal Value for all drugs tested is negative. This report includes final unconfirmed screening results to be used for medical treatment purposes only. Unconfirmed results must not be used for non-medical purposes such as employment or legal testing. Clinical consideration should be applied to any drug of abuse test, particulary when unconfirmed results are used.    Comprehensive Metabolic Panel [[id number]]  (Abnormal) Collected:  04/13/19 1847    Specimen:  Blood Updated:  04/13/19 1918     Glucose 49 mg/dL      BUN 27 mg/dL      Creatinine 1.54 mg/dL      Sodium 148 mmol/L      Potassium 3.8 mmol/L      Chloride 104 mmol/L      CO2 26.0 mmol/L      Calcium 9.9 mg/dL      Total Protein 8.2 g/dL      Albumin 4.70 g/dL      ALT (SGPT) 46 U/L      AST (SGOT) 67 U/L      Alkaline Phosphatase 86 U/L      Total Bilirubin 1.2 mg/dL      eGFR Non African Amer 50 mL/min/1.73      Globulin 3.5 gm/dL      A/G Ratio 1.3 g/dL      BUN/Creatinine Ratio 17.5     Anion Gap 18.0 mmol/L     Narrative:       GFR Normal >60  Chronic Kidney Disease <60  Kidney Failure <15    Acetaminophen Level [[id number]]  (Abnormal)  Collected:  04/13/19 1847    Specimen:  Blood Updated:  04/13/19 1912     Acetaminophen <5.0 mcg/mL     Ethanol [[id number]] Collected:  04/13/19 1847    Specimen:  Blood Updated:  04/13/19 1912     Ethanol <10 mg/dL      Ethanol % <0.010 %     Salicylate Level [[id number]]  (Normal) Collected:  04/13/19 1847    Specimen:  Blood Updated:  04/13/19 1912     Salicylate <0.3 mg/dL     Urinalysis With Microscopic If Indicated (No Culture) - Urine, Catheter [[id number]]  (Abnormal) Collected:  04/13/19 1858    Specimen:  Urine, Catheter Updated:  04/13/19 1912     Color, UA Dark Yellow     Appearance, UA Cloudy     pH, UA 6.0     Specific Gravity, UA 1.032     Comment: Result obtained by Refractometer        Glucose, UA Negative     Ketones, UA Trace     Bilirubin, UA Small (1+)     Blood, UA Small (1+)     Protein,  mg/dL (2+)     Leuk Esterase, UA Negative     Nitrite, UA Negative     Urobilinogen, UA 1.0 E.U./dL    CK [[id number]]  (Abnormal) Collected:  04/13/19 1847    Specimen:  Blood Updated:  04/13/19 1912     Creatine Kinase 1,444 U/L     Magnesium [[id number]]  (Abnormal) Collected:  04/13/19 1847    Specimen:  Blood Updated:  04/13/19 1912     Magnesium 2.7 mg/dL     Troponin [[id number]]  (Normal) Collected:  04/13/19 1847    Specimen:  Blood Updated:  04/13/19 1912     Troponin T 0.016 ng/mL     Narrative:       Troponin T Reference Range:  <= 0.03 ng/mL-   Negative for AMI  >0.03 ng/mL-     Abnormal for myocardial necrosis.  Clinicians would have to utilize clinical acumen, EKG, Troponin and serial changes to determine if it is an Acute Myocardial Infarction or myocardial injury due to an underlying chronic condition.     BNP [[id number]]  (Normal) Collected:  04/13/19 1847    Specimen:  Blood Updated:  04/13/19 1909     proBNP 245.6 pg/mL     Narrative:       Among patients with dyspnea, NT-proBNP is highly sensitive for the detection of acute congestive heart failure. In addition NT-proBNP of <300 pg/ml  effectively rules out acute congestive heart failure with 99% negative predictive value.          Imaging Results (most recent)     Procedure Component Value Units Date/Time    US Liver [[id number]] Collected:  04/15/19 1520     Updated:  04/16/19 0839    Narrative:         Ultrasound liver    HISTORY: Elevated liver function tests. Accidental poisoning.  Altered mental status.    Ultrasound examination of the right upper quadrant was performed.    COMPARISON: None. Correlation CT November 11, 2018.    Fatty infiltration of the liver.  No focal intrahepatic lesion.  The gallbladder is well displayed.  No calculi, wall thickening or pericholecystic fluid.  Common bile duct is within normal limits at 3.3 mm.  Images of the right kidney are unremarkable.  Right kidney 11.77 cm in length by 6.25 cm x 5.66 cm transverse.  Pancreas partially obscured by bowel gas.      Impression:       CONCLUSION:  Fatty infiltration of the liver.    [id number]    Electronically signed by:  Garfield Turner MD  4/16/2019 8:38 AM CDT  Workstation: [id number]    XR Chest 1 View [[id number]] Collected:  04/14/19 0539     Updated:  04/14/19 0558    Narrative:         Chest single view on  4/14/2019     CLINICAL INDICATION: ET tube placement    COMPARISON: 4/13/2019    FINDINGS: ET tube tip is in the mid to upper thoracic trachea  approximately 5.1 cm above the level of the lynette. NG tube  extends below the diaphragm and below the level of this film.  Multiple overlying wires are noted. Heart is upper limits normal  for size. There is mild left lower lung atelectasis. Lungs are  otherwise clear. Pulmonary vascularity is within normal limits.      Impression:       New NG tube extends below the diaphragm with  otherwise no significant change.    Electronically signed by:  Mike Ennis  4/14/2019 5:57 AM  CDT Workstation: [id number]    CT Head Without Contrast [[id number]] Collected:  04/13/19 1943     Updated:  04/13/19 2014    Narrative:        PROCEDURE: CT HEAD WO CONTRAST    INDICATION:  Altered mental status    COMPARISON:  5/22/2018    TECHNIQUE:  CT head, without iv contrast.      This exam was performed according to our departmental  dose-optimization program, which includes automated exposure  control, adjustment of the mA and/or kV according to patient size  and/or use of iterative reconstruction technique.  DLP is 955.3     FINDINGS:    Patient is intubated.    The cerebral parenchyma is within normal limits for age.    There is preservation of the gray-white matter differentiation.      No focal parenchymal lesions.    There is no evidence of a hemorrhage or intracranial mass.    There is no midline shift.    The ventricles are normal in size and configuration.    The brain stem, cerebellum, globes,  and osseous structures are  within normal limits.  Mucosal thickening in the ethmoid sinuses. There is an air-fluid  level in left maxillary sinus.  Severe circumferential mucosal thickening is present in the  leftward sphenoid sinus. Mastoids are well aerated and clear.      Impression:       No acute intracranial abnormality. Sinusitis as  above    If pain or symptoms persist beyond reasonable expectations, an  MRI examination is suggested as is deemed clinically appropriate.    Electronically signed by:  Allison Morelos MD  4/13/2019 8:12 PM CDT  Workstation: [id number]    XR Chest 1 View [[id number]] Collected:  04/13/19 1840     Updated:  04/13/19 1858    Narrative:       PROCEDURE: XR CHEST 1 VW    VIEWS:Single    INDICATION: Intubation    COMPARISON: CXR: 11/11/2018    FINDINGS:       - lines/tubes: Endotracheal tube is present with tip  approximately 1 cm cranial to the superior margin of the  clavicular heads    - cardiac: Borderline cardiomegaly    - mediastinum: Contour within normal limits.     - lungs: No evidence of a focal air space process, pulmonary  interstitial edema, nodule(s)/mass.     - pleura: No evidence of  fluid.      - osseous:  "Unremarkable for age.      Impression:         1. Endotracheal tube tip terminates approximately 1 cm superior  to the clavicular heads  2. Borderline cardiomegaly    Electronically signed by:  Allison Morelos MD  4/13/2019 6:57 PM CDT  Workstation: [id number]           Physician Progress Notes (most recent note)      Devonte Lopez MD at 4/16/2019  7:44 AM          Intensive Care Follow-up      LOS: 3 days     Mr. Marko Walters, 43 y.o. male is followed for: Drug OD agitation and ventilator     CHIEF COMPLIANT: Shortness of breath    Subjective - Interval History     This gentleman had accidental drug OD with behavior problem wound up being ventilated and sedated.  The ventilator was turned down however with a spontaneous breathing trial he is still breathing 40 times a minute and did not wake up    The patient's relevant past medical, surgical and social history were reviewed and updated in Epic as appropriate.     Objective   /74   Pulse 91   Temp 97 °F (36.1 °C) (Temporal)   Resp 21   Ht 177.8 cm (70\")   Wt 92.4 kg (203 lb 11.3 oz)   SpO2 94%   BMI 29.23 kg/m²        Vent Settings: FiO2 (%):  [30 %] 30 %  S RR:  [20] 20  PEEP/CPAP (cm H2O):  [5 cm H20] 5 cm H20  NE SUP:  [0 cm H20-8 cm H20] 8 cm H20  MAP (cm H2O):  [8.5-12] 8.5    Physical Exam: Sedated white male on mechanical ventilator    General Appearance:       Head:    Normocephalic, without obvious abnormality, atraumatic   Eyes:            Lids and lashes normal, conjunctivae and sclerae normal, no   icterus, no pallor, corneas clear, PERRLA   Ears:    Ears appear intact with no abnormalities noted   Throat:   No oral lesions, no thrush, oral mucosa moist   Neck:   No adenopathy, supple, trachea midline, no thyromegaly, no   carotid bruit, no JVD   Back:     No kyphosis present, no scoliosis present, no skin lesions,      erythema or scars, no tenderness to percussion or                   palpation,   range of motion normal   Lungs:  "   On IMV of 10 his respiratory rate was 20    Heart:    Regular rhythm and normal rate, normal S1 and S2, no            murmur, no gallop, no rub, no click   Chest Wall:    No abnormalities observed   Abdomen:     Normal bowel sounds, no masses, no organomegaly, soft        non-tender, non-distended, no guarding, no rebound                tenderness   Rectal:     Deferred   Extremities:   Moves all extremities well, no edema, no cyanosis, no             redness   Pulses:   Pulses palpable and equal bilaterally   Skin:   No bleeding, bruising or rash   Lymph nodes:   No palpable adenopathy   Neurologic:   Cranial nerves 2 - 12 grossly intact, sensation intact, DTR       present and equal bilaterally     LAB:    pH, Arterial   Date Value Ref Range Status   04/16/2019 7.410 7.350 - 7.450 pH units Final     pCO2, Arterial   Date Value Ref Range Status   04/16/2019 38.5 35.0 - 45.0 mm Hg Final     pO2, Arterial   Date Value Ref Range Status   04/16/2019 70.9 (L) 83.0 - 108.0 mm Hg Final     Comment:     84 Value below reference range     Lab Results   Component Value Date    WBC 6.84 04/16/2019    HGB 14.2 04/16/2019    HCT 42.3 04/16/2019    MCV 94.0 04/16/2019     04/16/2019     Lab Results   Component Value Date    GLUCOSE 98 04/16/2019    BUN 9 04/16/2019    CREATININE 0.62 (L) 04/16/2019    EGFRIFNONA 143 04/16/2019    BCR 14.5 04/16/2019    CO2 22.0 04/16/2019    CALCIUM 8.5 (L) 04/16/2019    ALBUMIN 3.20 (L) 04/16/2019    AST 53 (H) 04/16/2019    ALT 56 (H) 04/16/2019         RAD:   Imaging Results (last 24 hours)     Procedure Component Value Units Date/Time    US Liver [[id number]] Updated:  04/15/19 1621         Impression      Active Hospital Problems    Diagnosis   • **Accidental drug overdose   • Amphetamine abuse (CMS/HCC)            Plan        Assessment accidental drug OD with polypharmacy, agitation, persistent obtundation    Plan IMV of 10 pressure support of 15, reduce sedation.  Patient will  not be able to be extubated until he is fully awake.  I have asked the nursing staff to begin reducing sedation to        This document has been produced with the assistance of Dragon dictation  This document has been electronically signed by Devonte Lopez MD on April 16, 2019 7:46 AM       I discussed the patient's findings and my recommendations with patient and nursing staff       Electronically signed by Devonte Lopez MD at 4/16/2019  7:46 AM       Consult Notes (most recent note)     No notes of this type exist for this encounter.        Continued Stay Review  Auth # [id number]  Stephany Diaz RN,   [phone] phone  [phone] fax

## 2019-04-16 NOTE — PLAN OF CARE
Problem: Patient Care Overview  Goal: Plan of Care Review  Outcome: Ongoing (interventions implemented as appropriate)   04/16/19 1700   OTHER   Outcome Summary Patient is on the ventilator and sedated. The patient is currently receiving TF at 30 mL/hr with a goal rate of 55 mL/hr. The patient's propofol is at 13.7 mL/hr which provides 362 kcals. The patient is currently tolerating TF. I will continue to monitor TF tolerance, advancement, and make adjustments as necessary.    Coping/Psychosocial   Plan of Care Reviewed With patient;caregiver   Plan of Care Review   Progress no change

## 2019-04-16 NOTE — PLAN OF CARE
Problem: Ventilation, Mechanical Invasive (Adult)  Intervention: Prevent Airway Displacement/Mechanical Dysfunction   04/16/19 0407   Prevent Airway Displacement/Mechanical Dysfunction   Airway Safety Measures manual resuscitator/mask/valve in room;suction at bedside     Intervention: Prevent Ventilator-Induced Lung Injury   04/16/19 0407   Respiratory Interventions   Lung Protection Measures ventilator waveforms monitored;ventilator synchrony promoted;plateau/inspiratory pressure monitored     Intervention: Prevent Ventilator-Associated Pneumonia (VAP)   04/16/19 0407   VAP Prevention Measures   VAP Prevention Bundle spontaneous breathing trial performed     Intervention: Optimize Oxygenation/Ventilation   04/16/19 0407   Respiratory Interventions   Airway/Ventilation Management pulmonary hygiene promoted;humidification applied;airway patency maintained       Goal: Signs and Symptoms of Listed Potential Problems Will be Absent, Minimized or Managed (Ventilation, Mechanical Invasive)  Outcome: Ongoing (interventions implemented as appropriate)   04/16/19 0407   Goal/Outcome Evaluation   Problems Assessed (Mechanical Ventilation, Invasive) situational response;inability to wean   Problems Present (Mech Vent, Invasive) situational response

## 2019-04-16 NOTE — PROGRESS NOTES
[facility] Medicine Services  INPATIENT PROGRESS NOTE    Length of Stay: 3  Date of Admission: 4/13/2019  Primary Care Physician: Coty Boone APRN    Subjective   Chief Complaint: No new changes.    HPI: Patient is seen for follow-up today.  43 year old white male with medical history significant for opioid and methamphetamine abuse who was brought in by EMS for agitation after partying with friends.  He was intubated in the field, been admitted to CCU and remains intubated, sedated and restrained.  He is tolerating NG tube feeding.      Review of Systems  Unable to review as the patient is intubated and sedated.  Objective    Heart Rate:  [] 100  Resp:  [20-22] 21  BP: (119-149)/(57-91) 121/74  FiO2 (%):  [30 %] 30 %    Vent Settings:  FiO2 (%):  [30 %] 30 %  S RR:  [10-20] 10  PEEP/CPAP (cm H2O):  [5 cm H20] 5 cm H20  ME SUP:  [0 cm H20-15 cm H20] 15 cm H20  MAP (cm H2O):  [8.5-12] 12    Physical Exam   Constitutional: He appears well-developed and well-nourished. No distress. He is sedated, intubated and restrained.   HENT:   Head: Normocephalic and atraumatic.   Eyes: Pupils are equal, round, and reactive to light. No scleral icterus.   Neck: Neck supple. No JVD present. No thyromegaly present.   Cardiovascular: Normal rate, regular rhythm and normal heart sounds. Exam reveals no gallop and no friction rub.   No murmur heard.  Pulmonary/Chest: Effort normal. He is intubated. He has no wheezes. He has rhonchi. He has no rales. He exhibits no tenderness.   Abdominal: Soft. Bowel sounds are normal. He exhibits no distension and no mass. There is no tenderness. There is no rebound and no guarding.   Musculoskeletal: He exhibits no edema, tenderness or deformity.   Neurological: He exhibits normal muscle tone.   Skin: Skin is warm and dry. No rash noted. He is not diaphoretic. No erythema. No pallor.   Nursing note and vitals reviewed.        Medication  Review:    Current Facility-Administered Medications:   •  8-hydroxyquinoline sulfate (BAG BALM) ointment, , Apply externally, Daily, Jose Luevano MD  •  acetaminophen (TYLENOL) tablet 650 mg, 650 mg, Oral, Q6H PRN, Andrea Chappell MD  •  albuterol (PROVENTIL) nebulizer solution 0.083% 2.5 mg/3mL, 2.5 mg, Nebulization, Q4H PRN, Andrea Chappell MD  •  bacitracin ointment, , Topical, Daily, Jose Luevano MD  •  chlorhexidine (PERIDEX) 0.12 % solution 15 mL, 15 mL, Mouth/Throat, Q12H, Andrea Chappell MD, 15 mL at 04/15/19 1954  •  dextrose (D50W) 25 g/ 50mL Intravenous Solution 50 mL, 50 mL, Intravenous, Q1H PRN, Ozor, Catarino Mathews MD, 50 mL at 04/13/19 1925  •  enoxaparin (LOVENOX) syringe 40 mg, 40 mg, Subcutaneous, Q24H, Andrea Chappell MD, 40 mg at 04/16/19 0052  •  famotidine (PEPCID) injection 20 mg, 20 mg, Intravenous, Q12H, Andrea Chappell MD, 20 mg at 04/15/19 2141  •  fentaNYL citrate (PF) (SUBLIMAZE) injection 150 mcg, 150 mcg, Intravenous, Once, Andrea Chappell MD  •  fentaNYL citrate (PF) (SUBLIMAZE) injection 25 mcg, 25 mcg, Intravenous, Q1H PRN, Jose Luevano MD  •  lactated ringers infusion, 75 mL/hr, Intravenous, Continuous, Andrea Chappell MD, Last Rate: 75 mL/hr at 04/15/19 2212, 75 mL/hr at 04/15/19 2212  •  LORazepam (ATIVAN) injection 2 mg, 2 mg, Intravenous, Q30 Min PRN, Andrea Chappell MD, 2 mg at 04/15/19 0553  •  midazolam (VERSED) 50 mg in sodium chloride 0.9 % 100 mL (0.5 mg/mL) infusion, 1-10 mg/hr, Intravenous, Titrated, Andrea Chappell MD, Stopped at 04/16/19 0625  •  midazolam (VERSED) injection 10 mg, 10 mg, Intravenous, Once, Andrea Chappell MD  •  nicotine (NICODERM CQ) 21 MG/24HR patch 1 patch, 1 patch, Transdermal, Q24H, Andrea Chappell MD, 1 patch at 04/16/19 0052  •  PHENobarbital injection 200 mg, 200 mg, Intravenous, Q4H PRN, Andrea Chappell MD  •  propofol (DIPRIVAN) infusion 10 mg/mL 100 mL, 5-50 mcg/kg/min, Intravenous, Titrated, Lobo Lieberman MD, Last Rate: 16.45 mL/hr at  04/16/19 0350, 30 mcg/kg/min at 04/16/19 0350  •  sodium chloride 0.9 % flush 10 mL, 10 mL, Intravenous, PRNLuisana Aamir, MD  •  sodium chloride 0.9 % flush 10 mL, 10 mL, Intravenous, Q12H, Andrea Chappell MD  •  sodium chloride 0.9 % flush 10 mL, 10 mL, Intravenous, Q12H, Andrea Chappell MD  •  sodium chloride 0.9 % flush 10 mL, 10 mL, Intravenous, Q12H, Andrea Chappell MD  •  sodium chloride 0.9 % flush 10 mL, 10 mL, Intravenous, PRN, Andrea Chappell MD  •  sodium chloride 0.9 % flush 20 mL, 20 mL, Intravenous, KAMILAHNMisti Adam M, MD  •  sodium chloride 0.9 % flush 3 mL, 3 mL, Intravenous, Q12H, Andrea Chappell MD, 3 mL at 04/14/19 2114  •  sodium chloride 0.9 % flush 3-10 mL, 3-10 mL, Intravenous, PRNMisti Adam M, MD    Results Review:  I have reviewed the labs, radiology results, and diagnostic studies.    Laboratory Data:   Results from last 7 days   Lab Units 04/16/19  0332 04/15/19  0540 04/14/19  0348 04/13/19  1847   SODIUM mmol/L 138 138 144 148*   POTASSIUM mmol/L 3.9 4.5 4.3 3.8   CHLORIDE mmol/L 104 101 110* 104   CO2 mmol/L 22.0 21.0* 20.0* 26.0   BUN mg/dL 9 10 23* 27*   CREATININE mg/dL 0.62* 0.61* 0.78 1.54*   GLUCOSE mg/dL 98 76 93 49*   CALCIUM mg/dL 8.5* 8.5* 8.2* 9.9   BILIRUBIN mg/dL 0.4  --   --  1.2   ALK PHOS U/L 72  --   --  86   ALT (SGPT) U/L 56*  --   --  46*   AST (SGOT) U/L 53*  --   --  67*   ANION GAP mmol/L 12.0 16.0 14.0 18.0     Estimated Creatinine Clearance: 179.2 mL/min (A) (by C-G formula based on SCr of 0.62 mg/dL (L)).  Results from last 7 days   Lab Units 04/14/19  0348 04/13/19  1847   MAGNESIUM mg/dL 2.5 2.7*         Results from last 7 days   Lab Units 04/16/19  0332 04/15/19  0540 04/14/19  0348 04/13/19  1847   WBC 10*3/mm3 6.84 8.54 12.99* 18.15*   HEMOGLOBIN g/dL 14.2 14.2 14.1 16.5   HEMATOCRIT % 42.3 41.9 41.4 47.3   PLATELETS 10*3/mm3 242 168 237 345           Culture Data:   Blood Culture   Date Value Ref Range Status   04/13/2019 No growth at 2 days  Preliminary    04/13/2019 No growth at 2 days  Preliminary     No results found for: URINECX  No results found for: RESPCX  No results found for: WOUNDCX  No results found for: STOOLCX  No components found for: BODYFLD    Radiology Data:   Imaging Results (last 24 hours)     Procedure Component Value Units Date/Time    US Liver [[id number]] Collected:  04/15/19 1520     Updated:  04/16/19 0839    Narrative:         Ultrasound liver    HISTORY: Elevated liver function tests. Accidental poisoning.  Altered mental status.    Ultrasound examination of the right upper quadrant was performed.    COMPARISON: None. Correlation CT November 11, 2018.    Fatty infiltration of the liver.  No focal intrahepatic lesion.  The gallbladder is well displayed.  No calculi, wall thickening or pericholecystic fluid.  Common bile duct is within normal limits at 3.3 mm.  Images of the right kidney are unremarkable.  Right kidney 11.77 cm in length by 6.25 cm x 5.66 cm transverse.  Pancreas partially obscured by bowel gas.      Impression:       CONCLUSION:  Fatty infiltration of the liver.    [id number]    Electronically signed by:  Garfield Turner MD  4/16/2019 8:38 AM CDT  Workstation: [id number]          ABG:  Results from last 7 days   Lab Units 04/16/19  0441   PH, ARTERIAL pH units 7.410   PO2 ART mm Hg 70.9*   PCO2, ARTERIAL mm Hg 38.5   HCO3 ART mmol/L 24.3       I have reviewed the patient's current medications.     Assessment/Plan     Hospital Problem List:  Principal Problem:    Accidental drug overdose  Active Problems:    Amphetamine abuse (CMS/HCC)    -Accidental drug overdose complicated by acute metabolic encephalopathy and respiratory failure: Continue ventilatory support and bronchodilators.  Urine drug screen is positive for methamphetamine and opioids.  Input by Dr. Lopez is appreciated.    -Acute kidney injury: Resolved.    -Elevated LFTs were likely reactive and improving.  Hepatitis profile is remarkable and right upper quadrant  ultrasound shows fatty liver.    - Nontraumatic rhabdomyolysis: CK was 833 on 4/15/2019.  Continue IV hydration and trend CK.    -Nutrition: Continue G-tube feeding.    -Continue GI and DVT prophylaxis.    -Update was given to patient's family members who were at the bedside.       Discharge Planning: In progress.    Juan Pablo Reeves MD   04/16/19   10:01 AM

## 2019-04-17 LAB
CK SERPL-CCNC: 557 U/L (ref 20–200)
GLUCOSE BLDC GLUCOMTR-MCNC: 85 MG/DL (ref 70–130)
GLUCOSE BLDC GLUCOMTR-MCNC: 88 MG/DL (ref 70–130)
WHOLE BLOOD HOLD SPECIMEN: NORMAL

## 2019-04-17 PROCEDURE — 94799 UNLISTED PULMONARY SVC/PX: CPT

## 2019-04-17 PROCEDURE — 94003 VENT MGMT INPAT SUBQ DAY: CPT

## 2019-04-17 PROCEDURE — 82962 GLUCOSE BLOOD TEST: CPT

## 2019-04-17 PROCEDURE — 25010000002 PROPOFOL 1000 MG/ML EMULSION: Performed by: EMERGENCY MEDICINE

## 2019-04-17 PROCEDURE — 25010000002 ENOXAPARIN PER 10 MG: Performed by: INTERNAL MEDICINE

## 2019-04-17 PROCEDURE — 99231 SBSQ HOSP IP/OBS SF/LOW 25: CPT | Performed by: INTERNAL MEDICINE

## 2019-04-17 PROCEDURE — 94760 N-INVAS EAR/PLS OXIMETRY 1: CPT

## 2019-04-17 PROCEDURE — 82550 ASSAY OF CK (CPK): CPT | Performed by: INTERNAL MEDICINE

## 2019-04-17 PROCEDURE — 94640 AIRWAY INHALATION TREATMENT: CPT

## 2019-04-17 RX ORDER — ALBUTEROL SULFATE 2.5 MG/3ML
2.5 SOLUTION RESPIRATORY (INHALATION)
Status: DISCONTINUED | OUTPATIENT
Start: 2019-04-17 | End: 2019-04-19 | Stop reason: HOSPADM

## 2019-04-17 RX ADMIN — SODIUM CHLORIDE, PRESERVATIVE FREE 3 ML: 5 INJECTION INTRAVENOUS at 20:21

## 2019-04-17 RX ADMIN — SODIUM CHLORIDE 75 ML/HR: 9 INJECTION, SOLUTION INTRAVENOUS at 13:59

## 2019-04-17 RX ADMIN — PROPOFOL 50 MCG/KG/MIN: 10 INJECTION, EMULSION INTRAVENOUS at 03:37

## 2019-04-17 RX ADMIN — ALBUTEROL SULFATE 2.5 MG: 2.5 SOLUTION RESPIRATORY (INHALATION) at 19:52

## 2019-04-17 RX ADMIN — CHLORHEXIDINE GLUCONATE 0.12% ORAL RINSE 15 ML: 1.2 LIQUID ORAL at 11:00

## 2019-04-17 RX ADMIN — SODIUM CHLORIDE 75 ML/HR: 9 INJECTION, SOLUTION INTRAVENOUS at 00:41

## 2019-04-17 RX ADMIN — SODIUM CHLORIDE, PRESERVATIVE FREE 10 ML: 5 INJECTION INTRAVENOUS at 11:01

## 2019-04-17 RX ADMIN — CHLORHEXIDINE GLUCONATE 0.12% ORAL RINSE 15 ML: 1.2 LIQUID ORAL at 20:21

## 2019-04-17 RX ADMIN — FAMOTIDINE 20 MG: 10 INJECTION INTRAVENOUS at 11:00

## 2019-04-17 RX ADMIN — ALBUTEROL SULFATE 2.5 MG: 2.5 SOLUTION RESPIRATORY (INHALATION) at 14:43

## 2019-04-17 RX ADMIN — Medication: at 11:00

## 2019-04-17 RX ADMIN — FAMOTIDINE 20 MG: 10 INJECTION INTRAVENOUS at 20:21

## 2019-04-17 RX ADMIN — BACITRACIN: 500 OINTMENT TOPICAL at 11:00

## 2019-04-17 RX ADMIN — NICOTINE 1 PATCH: 21 PATCH, EXTENDED RELEASE TRANSDERMAL at 00:42

## 2019-04-17 RX ADMIN — ENOXAPARIN SODIUM 40 MG: 40 INJECTION SUBCUTANEOUS at 00:42

## 2019-04-17 NOTE — PROGRESS NOTES
[facility] Medicine Services  INPATIENT PROGRESS NOTE    Length of Stay: 4  Date of Admission: 4/13/2019  Primary Care Physician: Coty Boone APRN    Subjective   Chief Complaint: No new changes.    HPI: Patient is seen for follow-up today.  43 year old white male with medical history significant for opioid and methamphetamine abuse who was brought in by EMS for agitation after partying with friends.  He was intubated in the field, been admitted to CCU and remains intubated, sedated and restrained.  He is tolerating NG tube feeding and on vent weaning trials.      Review of Systems  Unable to review as the patient is intubated and sedated.  Objective    Heart Rate:  [] (P) 103  Resp:  [13-23] (P) 17  BP: (108-168)/(59-96) (P) 140/80  FiO2 (%):  [30 %] (P) 30 %    Vent Settings:  FiO2 (%):  [30 %] (P) 30 %  S RR:  [0-10] 0  PEEP/CPAP (cm H2O):  [5 cm H20] (P) 5 cm H20  MI SUP:  [8 cm H20-15 cm H20] (P) 8 cm H20  MAP (cm H2O):  [7.8-11] (P) 8.2    Physical Exam   Constitutional: He appears well-developed and well-nourished. No distress. He is sedated, intubated and restrained.   HENT:   Head: Normocephalic and atraumatic.   Eyes: Pupils are equal, round, and reactive to light. No scleral icterus.   Neck: Neck supple. No JVD present. No thyromegaly present.   Cardiovascular: Normal rate, regular rhythm and normal heart sounds. Exam reveals no gallop and no friction rub.   No murmur heard.  Pulmonary/Chest: Effort normal. He is intubated. He has no wheezes. He has rhonchi. He has no rales. He exhibits no tenderness.   Abdominal: Soft. Bowel sounds are normal. He exhibits no distension and no mass. There is no tenderness. There is no rebound and no guarding.   Musculoskeletal: He exhibits no edema, tenderness or deformity.   Neurological: He exhibits normal muscle tone.   Skin: Skin is warm and dry. No rash noted. He is not diaphoretic. No erythema. No  pallor.   Nursing note and vitals reviewed.        Medication Review:    Current Facility-Administered Medications:   •  8-hydroxyquinoline sulfate (BAG BALM) ointment, , Apply externally, Daily, Jose Luevano MD  •  acetaminophen (TYLENOL) tablet 650 mg, 650 mg, Oral, Q6H PRN, Andrea Chappell MD  •  albuterol (PROVENTIL) nebulizer solution 0.083% 2.5 mg/3mL, 2.5 mg, Nebulization, Q4H PRN, Andrea Chappell MD  •  albuterol (PROVENTIL) nebulizer solution 0.083% 2.5 mg/3mL, 2.5 mg, Nebulization, Q6H - RT, Devonte Lopez MD  •  bacitracin ointment, , Topical, Daily, Jose Luevano MD  •  chlorhexidine (PERIDEX) 0.12 % solution 15 mL, 15 mL, Mouth/Throat, Q12H, Andrea Chappell MD, 15 mL at 04/16/19 2033  •  dextrose (D50W) 25 g/ 50mL Intravenous Solution 50 mL, 50 mL, Intravenous, Q1H PRN, Catarino Grimm MD, 50 mL at 04/13/19 1925  •  enoxaparin (LOVENOX) syringe 40 mg, 40 mg, Subcutaneous, Q24H, Andrea Chappell MD, 40 mg at 04/17/19 0042  •  famotidine (PEPCID) injection 20 mg, 20 mg, Intravenous, Q12H, Andrea Chappell MD, 20 mg at 04/16/19 2033  •  fentaNYL citrate (PF) (SUBLIMAZE) injection 150 mcg, 150 mcg, Intravenous, Once, Andrea Chappell MD  •  midazolam (VERSED) 50 mg in sodium chloride 0.9 % 100 mL (0.5 mg/mL) infusion, 1-10 mg/hr, Intravenous, Titrated, Andrea Chappell MD, Stopped at 04/17/19 0542  •  midazolam (VERSED) injection 10 mg, 10 mg, Intravenous, Once, Andrea Chappell MD  •  nicotine (NICODERM CQ) 21 MG/24HR patch 1 patch, 1 patch, Transdermal, Q24H, Andrea Chappell MD, 1 patch at 04/17/19 0042  •  propofol (DIPRIVAN) infusion 10 mg/mL 100 mL, 5-50 mcg/kg/min, Intravenous, Titrated, Lobo Lieberman MD, Stopped at 04/17/19 0714  •  sodium chloride 0.9 % flush 10 mL, 10 mL, Intravenous, PRN, Lobo Lieberman MD  •  sodium chloride 0.9 % flush 10 mL, 10 mL, Intravenous, Q12H, Andrea Chappell MD  •  sodium chloride 0.9 % flush 10 mL, 10 mL, Intravenous, Q12H, Andrea Chappell MD  •  sodium chloride 0.9 % flush  10 mL, 10 mL, Intravenous, Q12H, Andrea Chappell MD  •  sodium chloride 0.9 % flush 10 mL, 10 mL, Intravenous, PRNMisti Adam M, MD  •  sodium chloride 0.9 % flush 20 mL, 20 mL, Intravenous, PRN, Andrea Chappell MD  •  sodium chloride 0.9 % flush 3 mL, 3 mL, Intravenous, Q12H, Andrea Chappell MD, 3 mL at 04/16/19 2032  •  sodium chloride 0.9 % flush 3-10 mL, 3-10 mL, Intravenous, PRN, Andrea Chappell MD  •  sodium chloride 0.9 % infusion, 75 mL/hr, Intravenous, Continuous, Juan Pablo Reeves MD, Last Rate: 75 mL/hr at 04/17/19 0041, 75 mL/hr at 04/17/19 0041    Results Review:  I have reviewed the labs, radiology results, and diagnostic studies.    Laboratory Data:   Results from last 7 days   Lab Units 04/16/19  0332 04/15/19  0540 04/14/19 0348 04/13/19  1847   SODIUM mmol/L 138 138 144 148*   POTASSIUM mmol/L 3.9 4.5 4.3 3.8   CHLORIDE mmol/L 104 101 110* 104   CO2 mmol/L 22.0 21.0* 20.0* 26.0   BUN mg/dL 9 10 23* 27*   CREATININE mg/dL 0.62* 0.61* 0.78 1.54*   GLUCOSE mg/dL 98 76 93 49*   CALCIUM mg/dL 8.5* 8.5* 8.2* 9.9   BILIRUBIN mg/dL 0.4  --   --  1.2   ALK PHOS U/L 72  --   --  86   ALT (SGPT) U/L 56*  --   --  46*   AST (SGOT) U/L 53*  --   --  67*   ANION GAP mmol/L 12.0 16.0 14.0 18.0     Estimated Creatinine Clearance: 179.6 mL/min (A) (by C-G formula based on SCr of 0.62 mg/dL (L)).  Results from last 7 days   Lab Units 04/14/19 0348 04/13/19  1847   MAGNESIUM mg/dL 2.5 2.7*         Results from last 7 days   Lab Units 04/16/19  0332 04/15/19  0540 04/14/19  0348 04/13/19  1847   WBC 10*3/mm3 6.84 8.54 12.99* 18.15*   HEMOGLOBIN g/dL 14.2 14.2 14.1 16.5   HEMATOCRIT % 42.3 41.9 41.4 47.3   PLATELETS 10*3/mm3 242 168 237 345           Culture Data:   No results found for: BLOODCX  No results found for: URINECX  No results found for: RESPCX  No results found for: WOUNDCX  No results found for: STOOLCX  No components found for: BODYFLD    Radiology Data:   Imaging Results (last 24 hours)     ** No  results found for the last 24 hours. **          ABG:  Results from last 7 days   Lab Units 04/16/19  0441   PH, ARTERIAL pH units 7.410   PO2 ART mm Hg 70.9*   PCO2, ARTERIAL mm Hg 38.5   HCO3 ART mmol/L 24.3       I have reviewed the patient's current medications.     Assessment/Plan     Hospital Problem List:  Principal Problem:    Accidental drug overdose  Active Problems:    Amphetamine abuse (CMS/HCC)    -Accidental drug overdose complicated by acute metabolic encephalopathy and respiratory failure: Continue ventilatory support and bronchodilators.  Patient is currently undergoing weaning trials and will likely be extubated today.  Urine drug screen is positive for methamphetamine and opioids.  Input by Dr. Lopez is appreciated.    -Acute kidney injury: Resolved.    -Elevated LFTs were likely reactive and improving.  Hepatitis profile is remarkable and right upper quadrant ultrasound shows fatty liver.    - Nontraumatic rhabdomyolysis: Improving a CK is down to 557 today. Continue IV hydration and trend CK.    -Nutrition: Continue G-tube feeding.    -Continue GI and DVT prophylaxis.    -Update was given to patient's family member who was at the bedside.       Discharge Planning: In progress.    Juan Pablo Reeves MD   04/17/19   9:43 AM

## 2019-04-17 NOTE — PLAN OF CARE
Problem: Ventilation, Mechanical Invasive (Adult)  Intervention: Prevent Airway Displacement/Mechanical Dysfunction   04/17/19 0415   Prevent Airway Displacement/Mechanical Dysfunction   Airway Safety Measures manual resuscitator/mask/valve in room;suction at bedside     Intervention: Prevent Ventilator-Induced Lung Injury   04/17/19 0415   Respiratory Interventions   Lung Protection Measures plateau/inspiratory pressure monitored;ventilator synchrony promoted;ventilator waveforms monitored     Intervention: Prevent Ventilator-Associated Pneumonia (VAP)   04/17/19 0415   VAP Prevention Measures   VAP Prevention Bundle vent circuit breaks minimized     Intervention: Optimize Oxygenation/Ventilation   04/17/19 0415   Respiratory Interventions   Airway/Ventilation Management pulmonary hygiene promoted;airway patency maintained;humidification applied       Goal: Signs and Symptoms of Listed Potential Problems Will be Absent, Minimized or Managed (Ventilation, Mechanical Invasive)  Outcome: Ongoing (interventions implemented as appropriate)   04/17/19 0415   Goal/Outcome Evaluation   Problems Assessed (Mechanical Ventilation, Invasive) inability to wean   Problems Present (Mech Vent, Invasive) inability to wean

## 2019-04-17 NOTE — PROGRESS NOTES
"Intensive Care Follow-up      LOS: 4 days     Mr. Marko Walters, 43 y.o. male is followed for: Ventilator management     CHIEF COMPLIANT: Shortness of breath  Subjective - Interval History     This gentleman is in the process of being weaned from the mechanical ventilator.  Sedation is turned off but is not fully awake.  He had some hemoptysis over the evening.  He does not appear to be in respiratory distress    The patient's relevant past medical, surgical and social history were reviewed and updated in Epic as appropriate.     Objective   /61 (BP Location: Right arm, Patient Position: Lying)   Pulse 79   Temp 97 °F (36.1 °C) (Temporal)   Resp 20   Ht 177.8 cm (70\")   Wt 92.9 kg (204 lb 12.9 oz)   SpO2 100%   BMI 29.39 kg/m²       Vent Settings: FiO2 (%):  [30 %] 30 %  S RR:  [0-10] 0  PEEP/CPAP (cm H2O):  [5 cm H20] 5 cm H20  WA SUP:  [8 cm H20-15 cm H20] 8 cm H20  MAP (cm H2O):  [7.8-12] 7.8    Physical Exam: Arousable on mechanical ventilator lungs reveal scattered rhonchi    General Appearance:       Head:    Normocephalic, without obvious abnormality, atraumatic   Eyes:            Lids and lashes normal, conjunctivae and sclerae normal, no   icterus, no pallor, corneas clear, PERRLA   Ears:    Ears appear intact with no abnormalities noted   Throat:   No oral lesions, no thrush, oral mucosa moist   Neck:   No adenopathy, supple, trachea midline, no thyromegaly, no   carotid bruit, no JVD   Back:     No kyphosis present, no scoliosis present, no skin lesions,      erythema or scars, no tenderness to percussion or                   palpation,   range of motion normal   Lungs:    Scattered rhonchi    Heart:    Regular rhythm and normal rate, normal S1 and S2, no            murmur, no gallop, no rub, no click   Chest Wall:    No abnormalities observed   Abdomen:     Normal bowel sounds, no masses, no organomegaly, soft        non-tender, non-distended, no guarding, no rebound                " tenderness   Rectal:     Deferred   Extremities:   Moves all extremities well, no edema, no cyanosis, no             redness   Pulses:   Pulses palpable and equal bilaterally   Skin:   No bleeding, bruising or rash   Lymph nodes:   No palpable adenopathy   Neurologic:   Cranial nerves 2 - 12 grossly intact, sensation intact, DTR       present and equal bilaterally     LAB:    pH, Arterial   Date Value Ref Range Status   04/16/2019 7.410 7.350 - 7.450 pH units Final     pCO2, Arterial   Date Value Ref Range Status   04/16/2019 38.5 35.0 - 45.0 mm Hg Final     pO2, Arterial   Date Value Ref Range Status   04/16/2019 70.9 (L) 83.0 - 108.0 mm Hg Final     Comment:     84 Value below reference range     Lab Results   Component Value Date    WBC 6.84 04/16/2019    HGB 14.2 04/16/2019    HCT 42.3 04/16/2019    MCV 94.0 04/16/2019     04/16/2019     Lab Results   Component Value Date    GLUCOSE 98 04/16/2019    BUN 9 04/16/2019    CREATININE 0.62 (L) 04/16/2019    EGFRIFNONA 143 04/16/2019    BCR 14.5 04/16/2019    CO2 22.0 04/16/2019    CALCIUM 8.5 (L) 04/16/2019    ALBUMIN 3.20 (L) 04/16/2019    AST 53 (H) 04/16/2019    ALT 56 (H) 04/16/2019         RAD:   Imaging Results (last 24 hours)     Procedure Component Value Units Date/Time    US Liver [[id number]] Collected:  04/15/19 1520     Updated:  04/16/19 0839    Narrative:         Ultrasound liver    HISTORY: Elevated liver function tests. Accidental poisoning.  Altered mental status.    Ultrasound examination of the right upper quadrant was performed.    COMPARISON: None. Correlation CT November 11, 2018.    Fatty infiltration of the liver.  No focal intrahepatic lesion.  The gallbladder is well displayed.  No calculi, wall thickening or pericholecystic fluid.  Common bile duct is within normal limits at 3.3 mm.  Images of the right kidney are unremarkable.  Right kidney 11.77 cm in length by 6.25 cm x 5.66 cm transverse.  Pancreas partially obscured by bowel  gas.      Impression:       CONCLUSION:  Fatty infiltration of the liver.    [id number]    Electronically signed by:  Garfield Turner MD  4/16/2019 8:38 AM CDT  Workstation: [id number]         Impression      Active Hospital Problems    Diagnosis   • **Accidental drug overdose   • Amphetamine abuse (CMS/HCC)            Plan        Assessment improving all condition, mild hemoptysis    Plan chest x-ray, extubation        This document has been produced with the assistance of Dragon dictation  This document has been electronically signed by Devonte Lopez MD on April 17, 2019 7:38 AM       I discussed the patient's findings and my recommendations with patient and nursing staff

## 2019-04-17 NOTE — PROGRESS NOTES
Discharge Planning Assessment  [facility]     Patient Name: Marko Walters  MRN: [id number]  Today's Date: 4/17/2019    Admit Date: 4/13/2019    Discharge Needs Assessment     Row Name 04/17/19 1328       Living Environment    Lives With  sibling(s)    Current Living Arrangements  home/apartment/condo    Primary Care Provided by  self    Provides Primary Care For  no one    Family Caregiver if Needed  sibling(s)    Quality of Family Relationships  helpful;supportive    Living Arrangement Comments  Pt resides at home with brother.        Resource/Environmental Concerns    Transportation Concerns  car, none       Transition Planning    Patient/Family Anticipates Transition to  home    Transportation Anticipated  family or friend will provide       Discharge Needs Assessment    Concerns to be Addressed  adjustment to diagnosis/illness;substance/tobacco abuse/use    Concerns Comments  According to sister pt attended rehab 1 year ago. Pt presented to hospital due to accidental overdaose.     Equipment Currently Used at Home  none    Equipment Needed After Discharge  none    Outpatient/Agency/Support Group Needs  outpatient substance abuse treatment    Current Discharge Risk  substance use/abuse        Discharge Plan     Row Name 04/17/19 0560       Plan    Plan Comments  LSW assessment complete. Pt resides at home with brother. According to sister pt has good support system. pt was independent prior to hospitalization. Sister also reports pt is employed. Pt apparently attended rehab 1 year ago. Pt presented to hospital due to accidental overdose. Per sister goal is for pt to return home at d/c. LSW/case mgt will provide Pt a copy of rehab resources once he is more alert. LSW awaiting additional recomendations from MD and therapy. LSW/case mgt will follow up as consulted and complete arrangements as ordered.     Row Name 04/17/19 1117       Plan    Plan Comments  LSW unable to complete LACE assessment. Pt  remains intubated. ADRA/case mgt will follow up at a later time and assess needs.         Destination      No service coordination in this encounter.      Durable Medical Equipment      No service coordination in this encounter.      Dialysis/Infusion      No service coordination in this encounter.      Home Medical Care      No service coordination in this encounter.      Therapy      No service coordination in this encounter.      Community Resources      No service coordination in this encounter.          Demographic Summary     Row Name 04/17/19 1320       General Information    Admission Type  inpatient    Reason for Consult  discharge planning    Preferred Language  English     Used During This Interaction  yes All information was gathered from pt's sister. Pt was intubated       Contact Information    Contact Information Obtained for          Functional Status     Row Name 04/17/19 132       Functional Status    Usual Activity Tolerance  good    Current Activity Tolerance  poor    Functional Status Comments  Pt is intubated       Functional Status, IADL    Medications  independent    Meal Preparation  independent    Housekeeping  independent    Laundry  independent    Shopping  independent       Mental Status Summary    Recent Changes in Mental Status/Cognitive Functioning  unable to assess    Mental Status Comments  Intubated       Employment/    Employment Status  employed full time        Psychosocial    No documentation.       Abuse/Neglect    No documentation.       Legal    No documentation.       Substance Abuse    No documentation.       Patient Forms    No documentation.           DARA Butcher

## 2019-04-17 NOTE — PLAN OF CARE
Problem: Patient Care Overview  Goal: Plan of Care Review  Outcome: Ongoing (interventions implemented as appropriate)   04/17/19 0433   OTHER   Outcome Summary Patient is currently on ventilator with sedation. He became agitated throughout the night, so I had to start him back on Versed. The patitne is currently receiving TF at a rate of 50 ml/hr with a goal of 55.    Coping/Psychosocial   Plan of Care Reviewed With patient   Plan of Care Review   Progress no change     Goal: Individualization and Mutuality  Outcome: Ongoing (interventions implemented as appropriate)    Goal: Discharge Needs Assessment  Outcome: Ongoing (interventions implemented as appropriate)    Goal: Interprofessional Rounds/Family Conf  Outcome: Ongoing (interventions implemented as appropriate)      Problem: Ventilation, Mechanical Invasive (Adult)  Goal: Signs and Symptoms of Listed Potential Problems Will be Absent, Minimized or Managed (Ventilation, Mechanical Invasive)  Outcome: Ongoing (interventions implemented as appropriate)      Problem: Restraint, Nonbehavioral (Nonviolent)  Goal: Rationale and Justification  Outcome: Ongoing (interventions implemented as appropriate)    Goal: Nonbehavioral (Nonviolent) Restraint: Absence of Injury/Harm  Outcome: Ongoing (interventions implemented as appropriate)    Goal: Nonbehavioral (Nonviolent) Restraint: Achievement of Discontinuation Criteria  Outcome: Ongoing (interventions implemented as appropriate)    Goal: Nonbehavioral (Nonviolent) Restraint: Preservation of Dignity and Wellbeing  Outcome: Ongoing (interventions implemented as appropriate)      Problem: Overdose, Ingestion/Inhalants (Adult)  Goal: Signs and Symptoms of Listed Potential Problems Will be Absent, Minimized or Managed (Overdose, Ingestion/Inhalants)  Outcome: Ongoing (interventions implemented as appropriate)      Problem: Skin Injury Risk (Adult)  Goal: Skin Health and Integrity  Outcome: Ongoing (interventions implemented  as appropriate)      Problem: Pain, Acute (Adult)  Goal: Identify Related Risk Factors and Signs and Symptoms  Outcome: Ongoing (interventions implemented as appropriate)    Goal: Acceptable Pain Control/Comfort Level  Outcome: Ongoing (interventions implemented as appropriate)

## 2019-04-18 ENCOUNTER — APPOINTMENT (OUTPATIENT)
Dept: GENERAL RADIOLOGY | Facility: HOSPITAL | Age: 44
End: 2019-04-18

## 2019-04-18 LAB
ANION GAP SERPL CALCULATED.3IONS-SCNC: 12 MMOL/L
ARTERIAL PATENCY WRIST A: POSITIVE
ATMOSPHERIC PRESS: 741 MMHG
BASE EXCESS BLDA CALC-SCNC: 3.3 MMOL/L (ref 0–2)
BASOPHILS # BLD AUTO: 0.02 10*3/MM3 (ref 0–0.2)
BASOPHILS NFR BLD AUTO: 0.3 % (ref 0–1.5)
BDY SITE: ABNORMAL
BUN BLD-MCNC: 7 MG/DL (ref 6–20)
BUN/CREAT SERPL: 13.2 (ref 7–25)
CALCIUM SPEC-SCNC: 9.3 MG/DL (ref 8.6–10.5)
CHLORIDE SERPL-SCNC: 102 MMOL/L (ref 98–107)
CK SERPL-CCNC: 427 U/L (ref 20–200)
CO2 SERPL-SCNC: 27 MMOL/L (ref 22–29)
CREAT BLD-MCNC: 0.53 MG/DL (ref 0.76–1.27)
DEPRECATED RDW RBC AUTO: 44.5 FL (ref 37–54)
EOSINOPHIL # BLD AUTO: 0.06 10*3/MM3 (ref 0–0.4)
EOSINOPHIL NFR BLD AUTO: 0.9 % (ref 0.3–6.2)
ERYTHROCYTE [DISTWIDTH] IN BLOOD BY AUTOMATED COUNT: 12.9 % (ref 12.3–15.4)
GAS FLOW AIRWAY: 3 LPM
GFR SERPL CREATININE-BSD FRML MDRD: >150 ML/MIN/1.73
GLUCOSE BLD-MCNC: 155 MG/DL (ref 65–99)
GLUCOSE BLDC GLUCOMTR-MCNC: 77 MG/DL (ref 70–130)
HCO3 BLDA-SCNC: 28.9 MMOL/L (ref 20–26)
HCT VFR BLD AUTO: 44.5 % (ref 37.5–51)
HGB BLD-MCNC: 14.9 G/DL (ref 13–17.7)
IMM GRANULOCYTES # BLD AUTO: 0.02 10*3/MM3 (ref 0–0.05)
IMM GRANULOCYTES NFR BLD AUTO: 0.3 % (ref 0–0.5)
LYMPHOCYTES # BLD AUTO: 1.21 10*3/MM3 (ref 0.7–3.1)
LYMPHOCYTES NFR BLD AUTO: 18.2 % (ref 19.6–45.3)
Lab: ABNORMAL
MCH RBC QN AUTO: 31.4 PG (ref 26.6–33)
MCHC RBC AUTO-ENTMCNC: 33.5 G/DL (ref 31.5–35.7)
MCV RBC AUTO: 93.9 FL (ref 79–97)
MODALITY: ABNORMAL
MONOCYTES # BLD AUTO: 0.65 10*3/MM3 (ref 0.1–0.9)
MONOCYTES NFR BLD AUTO: 9.8 % (ref 5–12)
NEUTROPHILS # BLD AUTO: 4.69 10*3/MM3 (ref 1.4–7)
NEUTROPHILS NFR BLD AUTO: 70.5 % (ref 42.7–76)
NRBC BLD AUTO-RTO: 0 /100 WBC (ref 0–0)
PCO2 BLDA: 46 MM HG (ref 35–45)
PH BLDA: 7.41 PH UNITS (ref 7.35–7.45)
PLATELET # BLD AUTO: 254 10*3/MM3 (ref 140–450)
PMV BLD AUTO: 9.5 FL (ref 6–12)
PO2 BLDA: 97 MM HG (ref 83–108)
POTASSIUM BLD-SCNC: 4 MMOL/L (ref 3.5–5.2)
RBC # BLD AUTO: 4.74 10*6/MM3 (ref 4.14–5.8)
SAO2 % BLDCOA: 97.9 % (ref 94–99)
SODIUM BLD-SCNC: 141 MMOL/L (ref 136–145)
VENTILATOR MODE: ABNORMAL
WBC NRBC COR # BLD: 6.65 10*3/MM3 (ref 3.4–10.8)

## 2019-04-18 PROCEDURE — 82803 BLOOD GASES ANY COMBINATION: CPT

## 2019-04-18 PROCEDURE — 82962 GLUCOSE BLOOD TEST: CPT

## 2019-04-18 PROCEDURE — 97162 PT EVAL MOD COMPLEX 30 MIN: CPT

## 2019-04-18 PROCEDURE — 92610 EVALUATE SWALLOWING FUNCTION: CPT | Performed by: SPEECH-LANGUAGE PATHOLOGIST

## 2019-04-18 PROCEDURE — 25010000002 ENOXAPARIN PER 10 MG: Performed by: INTERNAL MEDICINE

## 2019-04-18 PROCEDURE — 94799 UNLISTED PULMONARY SVC/PX: CPT

## 2019-04-18 PROCEDURE — 36600 WITHDRAWAL OF ARTERIAL BLOOD: CPT

## 2019-04-18 PROCEDURE — 82550 ASSAY OF CK (CPK): CPT | Performed by: INTERNAL MEDICINE

## 2019-04-18 PROCEDURE — 94760 N-INVAS EAR/PLS OXIMETRY 1: CPT

## 2019-04-18 PROCEDURE — 97165 OT EVAL LOW COMPLEX 30 MIN: CPT

## 2019-04-18 PROCEDURE — 85025 COMPLETE CBC W/AUTO DIFF WBC: CPT | Performed by: INTERNAL MEDICINE

## 2019-04-18 PROCEDURE — 80048 BASIC METABOLIC PNL TOTAL CA: CPT | Performed by: INTERNAL MEDICINE

## 2019-04-18 RX ORDER — FAMOTIDINE 10 MG/ML
20 INJECTION, SOLUTION INTRAVENOUS EVERY 12 HOURS SCHEDULED
Status: DISCONTINUED | OUTPATIENT
Start: 2019-04-18 | End: 2019-04-18

## 2019-04-18 RX ORDER — FAMOTIDINE 20 MG/1
20 TABLET, FILM COATED ORAL 2 TIMES DAILY
Status: DISCONTINUED | OUTPATIENT
Start: 2019-04-18 | End: 2019-04-19 | Stop reason: HOSPADM

## 2019-04-18 RX ORDER — DEXTROSE AND SODIUM CHLORIDE 5; .9 G/100ML; G/100ML
75 INJECTION, SOLUTION INTRAVENOUS CONTINUOUS
Status: DISCONTINUED | OUTPATIENT
Start: 2019-04-18 | End: 2019-04-18

## 2019-04-18 RX ADMIN — NICOTINE 1 PATCH: 21 PATCH, EXTENDED RELEASE TRANSDERMAL at 21:45

## 2019-04-18 RX ADMIN — BACITRACIN: 500 OINTMENT TOPICAL at 08:47

## 2019-04-18 RX ADMIN — SODIUM CHLORIDE 75 ML/HR: 9 INJECTION, SOLUTION INTRAVENOUS at 03:36

## 2019-04-18 RX ADMIN — ALBUTEROL SULFATE 2.5 MG: 2.5 SOLUTION RESPIRATORY (INHALATION) at 19:16

## 2019-04-18 RX ADMIN — Medication: at 08:47

## 2019-04-18 RX ADMIN — DEXTROSE AND SODIUM CHLORIDE 75 ML/HR: 5; 900 INJECTION, SOLUTION INTRAVENOUS at 11:05

## 2019-04-18 RX ADMIN — FAMOTIDINE 20 MG: 10 INJECTION INTRAVENOUS at 08:52

## 2019-04-18 RX ADMIN — ALBUTEROL SULFATE 2.5 MG: 2.5 SOLUTION RESPIRATORY (INHALATION) at 06:59

## 2019-04-18 RX ADMIN — ALBUTEROL SULFATE 2.5 MG: 2.5 SOLUTION RESPIRATORY (INHALATION) at 13:29

## 2019-04-18 RX ADMIN — CHLORHEXIDINE GLUCONATE 0.12% ORAL RINSE 15 ML: 1.2 LIQUID ORAL at 21:46

## 2019-04-18 RX ADMIN — ALBUTEROL SULFATE 2.5 MG: 2.5 SOLUTION RESPIRATORY (INHALATION) at 01:27

## 2019-04-18 RX ADMIN — FAMOTIDINE 20 MG: 20 TABLET ORAL at 21:46

## 2019-04-18 RX ADMIN — ENOXAPARIN SODIUM 40 MG: 40 INJECTION SUBCUTANEOUS at 00:08

## 2019-04-18 RX ADMIN — NICOTINE 1 PATCH: 21 PATCH, EXTENDED RELEASE TRANSDERMAL at 00:07

## 2019-04-18 NOTE — THERAPY EVALUATION
"Acute Care - Physical Therapy Initial Evaluation  [facility]     Patient Name: Marko Walters  : 1975  MRN: [id number]  Today's Date: 2019   Onset of Illness/Injury or Date of Surgery: 19  Date of Referral to PT: 19  Referring Physician: Juan Pablo Reeves MD      Admit Date: 2019    Visit Dx:     ICD-10-CM ICD-9-CM   1. Accidental drug overdose, initial encounter T50.901A 977.9     E858.9   2. Altered mental status, unspecified altered mental status type R41.82 780.97   3. Dysphagia, unspecified type R13.10 787.20   4. Impairment of balance R26.89 781.2     Patient Active Problem List   Diagnosis   • Non-traumatic rhabdomyolysis   • Amphetamine abuse (CMS/HCC)   • Acute pain of left shoulder   • Cervical radiculitis   • Accidental drug overdose     Past Medical History:   Diagnosis Date   • Hypertension      History reviewed. No pertinent surgical history.     PT ASSESSMENT (last 12 hours)      Physical Therapy Evaluation     Row Name 19 1418          PT Evaluation Time/Intention    Subjective Information  no complaints;swelling \"feeling bad\"  -LF     Document Type  evaluation  -LF     Mode of Treatment  co-treatment;physical therapy;occupational therapy  -LF     Patient Effort  good  -LF     Symptoms Noted During/After Treatment  none  -LF     Row Name 19 1418          General Information    Patient Profile Reviewed?  yes  -LF     Onset of Illness/Injury or Date of Surgery  19  -LF     Referring Physician  Juan Pablo Reeves MD  -LF     Patient Observations  alert;cooperative;agree to therapy  -LF     Patient/Family Observations  friend in room  -LF     General Observations of Patient  supine in bed, O2 (2 lpm), tele, IV  -LF     Prior Level of Function  independent:;all household mobility;community mobility;gait;transfer;ADL's;home management  -LF     Equipment Currently Used at Home  none  -LF     Pertinent History of Current Functional Problem  Pt " hospitalized due to accidental drug overdose. Pt was positive for methamphetamine and opiods.  -     Existing Precautions/Restrictions  fall;oxygen therapy device and L/min  -     Equipment Issued to Patient  gait belt  -     Risks Reviewed  patient:;LOB;nausea/vomiting;dizziness;increased discomfort;change in vital signs;increased drainage;lines disloged  -     Benefits Reviewed  patient:;improve function;increase independence;increase strength;increase balance;decrease pain;increase knowledge;improve skin integrity;decrease risk of DVT  -     Row Name 04/18/19 1418          Relationship/Environment    Lives With  sibling(s) brother  -     Primary Roles/Responsibilities  wage earner, full time  -     Row Name 04/18/19 1418          Resource/Environmental Concerns    Current Living Arrangements  home/apartment/condo  -     Row Name 04/18/19 1418          Cognitive Assessment/Intervention- PT/OT    Orientation Status (Cognition)  oriented to;person;place;time uses white board as cue for date  -     Follows Commands (Cognition)  follows one step commands;over 90% accuracy  -     Safety Deficit (Cognitive)  moderate deficit;insight into deficits/self awareness;impulsivity;safety precautions awareness  -     Row Name 04/18/19 1418          Safety Issues, Functional Mobility    Impairments Affecting Function (Mobility)  balance;endurance/activity tolerance  -     Row Name 04/18/19 1418          Bed Mobility Assessment/Treatment    Bed Mobility Assessment/Treatment  supine-sit;sit-supine  -     Supine-Sit [place] (Bed Mobility)  contact guard;verbal cues  -     Sit-Supine [place] (Bed Mobility)  contact guard;verbal cues  -     Assistive Device (Bed Mobility)  bed rails;head of bed elevated  -     Row Name 04/18/19 1418          Transfer Assessment/Treatment    Transfer Assessment/Treatment  sit-stand transfer;stand-sit transfer  -     Sit-Stand [place] (Transfers)  contact  guard;verbal cues  -LF     Stand-Sit [place] (Transfers)  contact guard;verbal cues  -LF     Row Name 04/18/19 1418          Sit-Stand Transfer    Assistive Device (Sit-Stand Transfers)  -- no AD  -LF     Row Name 04/18/19 1418          Stand-Sit Transfer    Assistive Device (Stand-Sit Transfers)  -- no AD  -LF     Row Name 04/18/19 1418          Gait/Stairs Assessment/Training    [place] Level (Gait)  minimum assist (75% patient effort);1 person to manage equipment 2 hands needed in gait belt, so extra helper for IV pole  -LF     Assistive Device (Gait)  other (see comments) no AD  -LF     Distance in Feet (Gait)  130 feet  -     Pattern (Gait)  step-through  -     Deviations/Abnormal Patterns (Gait)  other (see comments) unsteady; deviating from straight path  -     Row Name 04/18/19 1418          General ROM    GENERAL ROM COMMENTS  BLE AROM WNL  -[facility] Name 04/18/19 1418          MMT (Manual Muscle Testing)    General MMT Comments  BLE strength 5/5  -[facility] Name 04/18/19 1418          Motor Assessment/Intervention    Additional Documentation  Balance (Group)  -[facility] Name 04/18/19 1418          Balance    Balance  static sitting balance;static standing balance;dynamic sitting balance;dynamic standing balance  -[facility] Name 04/18/19 1418          Static Sitting Balance    Level of [place] (Unsupported Sitting, Static Balance)  standby assist  -     Sitting Position (Unsupported Sitting, Static Balance)  sitting on edge of bed  -     Time Able to Maintain Position (Unsupported Sitting, Static Balance)  more than 5 minutes  -[facility] Name 04/18/19 1418          Dynamic Sitting Balance    Level of [place], Reaches Outside Midline (Sitting, Dynamic Balance)  standby assist when donning socks  -     Sitting Position, Reaches Outside Midline (Sitting, Dynamic Balance)  sitting on edge of bed  -     Row Name 04/18/19 1418          Static Standing Balance    Level of  [place] (Unsupported Standing, Static Balance)  standby assist  -[facility] Name 04/18/19 1418          Dynamic Standing Balance    Level of [place], Resists Mild Perturbations (Standing, Dynamic Balance)  minimal assist, 75% patient effort  -     Comment, Resists Mild Perturbations (Sitting, Dynamic Balance)  Tinetti 24/28 = Low fall risk  -[facility] Name 04/18/19 1418          Sensory Assessment/Intervention    Sensory General Assessment  no sensation deficits identified in LEs  -LF     Row Name 04/18/19 1418          Pain Assessment    Additional Documentation  Pain Scale: Numbers Pre/Post-Treatment (Group)  -LF     Row Name 04/18/19 1418          Pain Scale: Numbers Pre/Post-Treatment    Pain Scale: Numbers, Pretreatment  0/10 - no pain  -     Pain Scale: Numbers, Post-Treatment  0/10 - no pain  -LF     Row Name             Wound 04/13/19 2140 Left posterior toe    Wound - Properties Group Date first assessed: 04/13/19  -PD Time first assessed: 2140  -PD Present On Admission : picture taken;yes  -PD Side: Left  -PD Orientation: posterior  -PD Location: toe  -PD, great toe  Type: --  -PD Stage, Pressure Injury: Stage 2  -PD, ruptured blister with skin flap     Row Name             Wound 04/13/19 2140 Left lateral toe    Wound - Properties Group Date first assessed: 04/13/19  -PD Time first assessed: 2140  -PD Present On Admission : yes;picture taken  -PD Side: Left  -PD Orientation: lateral  -PD Location: toe  -PD, 5th digit  Stage, Pressure Injury: Stage 2  -PD, ruptured blister no skin flap     Row Name             Wound 04/13/19 2140 Right posterior toe    Wound - Properties Group Date first assessed: 04/13/19  -PD Time first assessed: 2140  -PD Present On Admission : yes;picture taken  -PD Side: Right  -PD Orientation: posterior  -PD Location: toe  -PD, R. great toe  Stage, Pressure Injury: deep tissue injury  -PD, R great toe blister intact     Row Name             Wound 04/13/19 2140 Right  lateral foot    Wound - Properties Group Date first assessed: 04/13/19  -PD Time first assessed: 2140  -PD Present On Admission : yes  -PD Side: Right  -PD Orientation: lateral  -PD Location: foot  -PD Stage, Pressure Injury: Stage 2  -PD, ruptured blister with skin flap     Row Name 04/18/19 1418          Plan of Care Review    Plan of Care Reviewed With  patient  -LF     Row Name 04/18/19 1418          Physical Therapy Clinical Impression    Date of Referral to PT  04/18/19  -LF     PT Diagnosis (PT Clinical Impression)  impaired balance affecting functional mobility  -LF     Prognosis (PT Clinical Impression)  good  -LF     Functional Level at Time of Evaluation (PT Clinical Impression)  Min assist needed with ambulation due to impaired balance  -LF     Patient/Family Goals Statement (PT Clinical Impression)  return to PLOF  -LF     Criteria for Skilled Interventions Met (PT Clinical Impression)  yes;treatment indicated  -LF     Pathology/Pathophysiology Noted (Describe Specifically for Each System)  musculoskeletal;neuromuscular  -LF     Impairments Found (describe specific impairments)  gait, locomotion, and balance;aerobic capacity/endurance  -LF     Rehab Potential (PT Clinical Summary)  good, to achieve stated therapy goals  -LF     Predicted Duration of Therapy (PT)  until acute care discharge or until all PT goals are met  -LF     Care Plan Review (PT)  evaluation/treatment results reviewed;care plan/treatment goals reviewed;risks/benefits reviewed;current/potential barriers reviewed;patient/other agree to care plan  -LF     Row Name 04/18/19 1418          Vital Signs    Pre Systolic BP Rehab  108  -LF     Pre Treatment Diastolic BP  61  -LF     Post Systolic BP Rehab  116  -LF     Pretreatment Heart Rate (beats/min)  100  -LF     Posttreatment Heart Rate (beats/min)  100  -LF     Pre SpO2 (%)  94  -LF     O2 Delivery Pre Treatment  supplemental O2  -LF     Intra SpO2 (%)  97  -LF     O2 Delivery Intra  Treatment  room air  -LF     Post SpO2 (%)  93  -LF     O2 Delivery Post Treatment  room air  -LF     Pre Patient Position  Supine  -LF     Intra Patient Position  Standing  -LF     Post Patient Position  Supine  -LF     Row Name 04/18/19 1418          Physical Therapy Goals    Transfer Goal Selection (PT)  transfer, PT goal 1  -LF     Gait Training Goal Selection (PT)  gait training, PT goal 1  -LF     Balance Goal Selection (PT)  balance, PT goal (free text)  -LF     Additional Documentation  Balance Goal Selection (PT) (Row)  -LF     Row Name 04/18/19 1418          Transfer Goal 1 (PT)    Activity/Assistive Device (Transfer Goal 1, PT)  sit-to-stand/stand-to-sit;bed-to-chair/chair-to-bed no AD  -LF     [place] Level/Cues Needed (Transfer Goal 1, PT)  independent  -LF     Time Frame (Transfer Goal 1, PT)  3 days  -LF     Barriers (Transfers Goal 1, PT)  safety awareness  -LF     Progress/Outcome (Transfer Goal 1, PT)  goal not met  -LF     Row Name 04/18/19 1418          Gait Training Goal 1 (PT)    Activity/Assistive Device (Gait Training Goal 1, PT)  improve balance and speed;increase endurance/gait distance;diminish gait deviation no AD  -LF     [place] Level (Gait Training Goal 1, PT)  independent  -LF     Distance (Gait Goal 1, PT)  500 feet or more  -LF     Time Frame (Gait Training Goal 1, PT)  4 days  -LF     Barriers (Gait Training Goal 1, PT)  safety awareness  -LF     Progress/Outcome (Gait Training Goal 1, PT)  goal not met  -LF     Row Name 04/18/19 1418          Balance Goal (PT)    Balance Goal (PT)  Patient will score 28/28 on Tinetti to demo improved balance and safety  -LF     Time Frame (Balance Goal, PT)  4 days  -LF     Barriers (Balance Goal, PT)  safety awareness  -LF     Progress/Outcome (Balance Goal, PT)  goal not met  -LF     Row Name 04/18/19 1418          Positioning and Restraints    Pre-Treatment Position  in bed  -LF     Post Treatment Position  bed  -LF     In Bed   fowlers;call light within reach;encouraged to call for assist;patient within staff view;with family/caregiver;side rails up x3  -     Row Name 04/18/19 1418          Living Environment    Home Accessibility  tub/shower is not walk in;other (see comments) pt reported there are no stairs to enter home  -       User Key  (r) = Recorded By, (t) = Taken By, (c) = Cosigned By    Initials Name Provider Type    Susan Richards RN Registered Nurse     Olimpia Barba, PT Physical Therapist        Physical Therapy Education     Title: PT OT SLP Therapies (In Progress)     Topic: Physical Therapy (In Progress)     Point: Mobility training (Done)     Learning Progress Summary           Patient Acceptance, E, NR,VU by  at 4/18/2019  3:41 PM    Comment:  Role of PT, PT POC, safety with ambulation                   Point: Precautions (Done)     Learning Progress Summary           Patient Acceptance, E, VU,NR by  at 4/18/2019  3:41 PM    Comment:  gait belt, call light, staff assistance with mobility                               User Key     Initials Effective Dates Name Provider Type Discipline     07/23/18 -  Olimpia Barba, PT Physical Therapist PT              PT Recommendation and Plan  Anticipated Discharge Disposition (PT): home with assist(no follow-up PT if all PT goals met before d/c)  Planned Therapy Interventions (PT Eval): balance training, bed mobility training, gait training, home exercise program, neuromuscular re-education, patient/family education, stair training, strengthening, transfer training  Therapy Frequency (PT Clinical Impression): 5 times/wk  Outcome Summary/Treatment Plan (PT)  Anticipated Discharge Disposition (PT): home with assist(no follow-up PT if all PT goals met before d/c)  Plan of Care Reviewed With: patient  Outcome Summary: PT eval completed as co-eval with OT. Patient needed SBA with sitting balance and Min assist with ambulation due to impaired balance. Patient demo'd decreased  "understanding of his safety/balance impairement at this time. Patient can benefit from skilled PT to facilitate improved balance and safety with functional mobility. Patient may not need follow-up PT after acute care discharge if all PT goals are met, but need for outpatient PT is a possibility.  Outcome Measures     Row Name 04/18/19 1417             How much help from another person do you currently need...    Turning from your back to your side while in flat bed without using bedrails?  4  -LF      Moving from lying on back to sitting on the side of a flat bed without bedrails?  3  -LF      Moving to and from a bed to a chair (including a wheelchair)?  3  -LF      Standing up from a chair using your arms (e.g., wheelchair, bedside chair)?  3  -LF      Climbing 3-5 steps with a railing?  3  -LF      To walk in hospital room?  3  -LF      AM-PAC 6 Clicks Score  19  -LF         Tinetti Assessment    Tinetti Assessment  yes  -LF      Sitting Balance  1  -LF      Arises  1  -LF      Attempts to Rise  2  -LF      Immediate Standing Balance (first 5 sec)  2  -LF      Standing Balance  2  -LF      Sternal Nudge (feet close together)  1  -LF      Eyes Closed (feet close together)  1  -LF      Turning 360 Degrees- Steps  1  -LF      Turning 360 Degrees- Steadiness  1  -LF      Sitting Down  1  -LF      Tinetti Balance Score  13  -LF      Gait Initiation (immediate after told \"go\")  1  -LF      Step Length- Right Swing  1  -LF      Step Length- Left Swing  1  -LF      Foot Clearance- Right Foot  1  -LF      Foot Clearance- Left Foot  1  -LF      Step Symmetry  1  -LF      Step Continuity  1  -LF      Path (excursion)  1  -LF      Trunk  2  -LF      Base of Support  1  -LF      Gait Score  11  -LF      Tinetti Total Score  24  -LF      Tinetti Assistive Device  other (see comments) no AD  -LF      Tinetti Assessment Comments  24/28 = Low fall risk  -LF         Functional Assessment    Outcome Measure Options  AM-PAC 6 Clicks " Basic Mobility (PT);Tinetti  -LF        User Key  (r) = Recorded By, (t) = Taken By, (c) = Cosigned By    Initials Name Provider Type    LF Olipmia Barba, PT Physical Therapist         Time Calculation:   PT Charges     Row Name 04/18/19 1418             Time Calculation    Start Time  1417  -LF      Stop Time  1442  -LF      Time Calculation (min)  25 min  -LF      PT Received On  04/18/19  -LF      PT Goal Re-Cert Due Date  05/01/19  -LF         Time Calculation- PT    Total Timed Code Minutes- PT  25 minute(s)  -LF        User Key  (r) = Recorded By, (t) = Taken By, (c) = Cosigned By    Initials Name Provider Type     Olimpia Barba, PT Physical Therapist        Therapy Charges for Today     Code Description Service Date Service Provider Modifiers Qty    69813350315 HC PT EVAL MOD COMPLEXITY 2 4/18/2019 Olimpia Barba PT GP 1          PT G-Codes  Outcome Measure Options: AM-PAC 6 Clicks Basic Mobility (PT), [place]  AM-PAC 6 Clicks Score: 19  Tinetti Total Score: 24      Olimpia Barba PT  4/18/2019

## 2019-04-18 NOTE — THERAPY DISCHARGE NOTE
Acute Care - Speech Language Pathology   Swallow Eval/Discharge [facility]     Patient Name: Marko Walters  : 1975  MRN: [id number]  Today's Date: 2019               Admit Date: 2019    Visit Dx:    ICD-10-CM ICD-9-CM   1. Accidental drug overdose, initial encounter T50.901A 977.9     E858.9   2. Altered mental status, unspecified altered mental status type R41.82 780.97   3. Dysphagia, unspecified type R13.10 787.20     Patient Active Problem List   Diagnosis   • Non-traumatic rhabdomyolysis   • Amphetamine abuse (CMS/HCC)   • Acute pain of left shoulder   • Cervical radiculitis   • Accidental drug overdose     Past Medical History:   Diagnosis Date   • Hypertension      History reviewed. No pertinent surgical history.       SWALLOW EVALUATION (last 72 hours)      SLP Adult Swallow Evaluation     Row Name 19 1232                   Rehab Evaluation    Document Type  evaluation  -EK        Subjective Information  no complaints  -EK        Patient Observations  alert;cooperative;agree to therapy  -EK        Patient/Family Observations  Family member in room   -EK        Patient Effort  good  -EK           General Information    Patient Profile Reviewed  yes  -EK        Current Method of Nutrition  NPO  -EK        Prior Level of Function-Communication  WFL  -EK        Prior Level of Function-Swallowing  no diet consistency restrictions  -EK           Oral Motor and Function    Dentition Assessment  natural, present and adequate  -EK        Secretion Management  WNL/WFL  -EK        Mucosal Quality  moist, healthy  -EK        Volitional Swallow  WFL  -EK        Volitional Cough  WFL  -EK           General Eating/Swallowing Observations    Respiratory Support Currently in Use  nasal cannula  -EK        Eating/Swallowing Skills  self-fed  -EK        Positioning During Eating  upright 90 degree;upright in bed  -EK        Utensils Used  spoon;cup;straw  -EK        Consistencies Trialed   regular textures;pureed;thin liquids  -EK        Pre SpO2 (%)  96  -EK        Post SpO2 (%)  96  -EK           Clinical Swallow Eval    Oral Prep Phase  WFL  -EK        Oral Transit  WFL  -EK        Oral Residue  WFL  -EK        Pharyngeal Phase  WFL  -EK        Clinical Swallow Evaluation Summary  Swallow evaluation WFL; no s/s of aspiration. SLP initiated diet of regular solids and regular liquids. No tx recommended. RN and pt aware of diet initation.    -EK           Clinical Impression    SLP Swallowing Diagnosis  functional oral phase;functional pharyngeal phase  -EK        Functional Impact  no impact on function  -EK           Recommendations    Therapy Frequency (Swallow)  evaluation only  -EK        SLP Diet Recommendation  regular textures;thin liquids  -EK        Recommended Precautions and Strategies  upright posture during/after eating;alternate between small bites of food and sips of liquid;small bites of food and sips of liquid  -EK        SLP Rec. for Method of Medication Administration  meds whole;with thin liquids  -EK        Monitor for Signs of Aspiration  yes  -EK          User Key  (r) = Recorded By, (t) = Taken By, (c) = Cosigned By    Initials Name Effective Dates    Coty Flores CCC-SLP 06/08/18 -           EDUCATION  The patient has been educated in the following areas:   Dysphagia (Swallowing Impairment).    SLP Recommendation and Plan  SLP Swallowing Diagnosis: functional oral phase, functional pharyngeal phase  SLP Diet Recommendation: regular textures, thin liquids     Monitor for Signs of Aspiration: yes              Therapy Frequency (Swallow): evaluation only          Plan of Care Reviewed With: patient  Progress: improving  Outcome Summary: Swallow evaluation WFL; no s/s of aspiration. SLP initiated diet of regular solids and regular liquids. No tx recommended. RN and pt aware of diet initation.              Time Calculation:   Time Calculation- SLP     Row Name  04/18/19 1249             Time Calculation- SLP    SLP Start Time  1232  -EK      SLP Stop Time  1249  -EK      SLP Time Calculation (min)  17 min  -EK      SLP Received On  04/18/19  -EK        User Key  (r) = Recorded By, (t) = Taken By, (c) = Cosigned By    Initials Name Provider Type    Coty Flores CCC-SLP Speech and Language Pathologist          Therapy Charges for Today     Code Description Service Date Service Provider Modifiers Qty    [id number]  ST EVAL ORAL PHARYNG SWALLOW 1 4/18/2019 Coty De Los Santos CCC-SLP GN 1                    DAPHNE Sidhu  4/18/2019

## 2019-04-18 NOTE — PROGRESS NOTES
[facility] Medicine Services  INPATIENT PROGRESS NOTE    Length of Stay: 5  Date of Admission: 4/13/2019  Primary Care Physician: Coty Boone APRN    Subjective   Chief Complaint: No new complaints.     HPI: Patient is seen for follow-up today.  43 year old white male with medical history significant for opioid and methamphetamine abuse who was brought in by EMS for agitation after partying with friends.  He was intubated in the field and admitted to CCU.  She has since been extubated, doing well and maintaining O2 saturation in the mid to upper 90s on 2 L of supplemental oxygen.  He is slightly deconditioned but voices no new complaints.     Review of Systems   Constitutional: Positive for activity change, appetite change and fatigue. Negative for chills, diaphoresis and fever.   HENT: Negative for trouble swallowing and voice change.    Eyes: Negative for photophobia and visual disturbance.   Respiratory: Negative for cough, choking, chest tightness, shortness of breath, wheezing and stridor.    Cardiovascular: Negative for chest pain, palpitations and leg swelling.   Gastrointestinal: Negative for abdominal distention, abdominal pain, blood in stool, constipation, diarrhea, nausea and vomiting.   Endocrine: Negative for cold intolerance, heat intolerance, polydipsia, polyphagia and polyuria.   Genitourinary: Negative for decreased urine volume, difficulty urinating, dysuria, enuresis, flank pain, frequency, hematuria and urgency.   Musculoskeletal: Negative for arthralgias, gait problem, myalgias, neck pain and neck stiffness.   Skin: Negative for pallor, rash and wound.   Neurological: Negative for dizziness, tremors, seizures, syncope, facial asymmetry, speech difficulty, weakness, light-headedness, numbness and headaches.   Hematological: Does not bruise/bleed easily.   Psychiatric/Behavioral: Negative for agitation, behavioral problems and confusion.        Objective    Temp:  [98.4 °F (36.9 °C)-98.9 °F (37.2 °C)] 98.4 °F (36.9 °C)  Heart Rate:  [] 94  Resp:  [12-22] 20  BP: (115-162)/(59-94) 133/82  FiO2 (%):  [30 %] 30 %    Vent Settings:  FiO2 (%):  [30 %] 30 %  PEEP/CPAP (cm H2O):  [5 cm H20] 5 cm H20  PA SUP:  [8 cm H20] 8 cm H20  MAP (cm H2O):  [8] 8    Physical Exam   Constitutional: He appears well-developed and well-nourished. No distress.   HENT:   Head: Normocephalic and atraumatic.   Eyes: EOM are normal. Pupils are equal, round, and reactive to light. No scleral icterus.   Neck: Normal range of motion. Neck supple. No JVD present. No thyromegaly present.   Cardiovascular: Normal rate, regular rhythm and normal heart sounds. Exam reveals no gallop and no friction rub.   No murmur heard.  Pulmonary/Chest: Effort normal. He has no wheezes. He has rhonchi. He has no rales. He exhibits no tenderness.   Abdominal: Soft. Bowel sounds are normal. He exhibits no distension and no mass. There is no tenderness. There is no rebound and no guarding.   Musculoskeletal: He exhibits no edema, tenderness or deformity.   Neurological: He displays normal reflexes. No cranial nerve deficit or sensory deficit. He exhibits normal muscle tone.   Skin: Skin is warm and dry. No rash noted. He is not diaphoretic. No erythema. No pallor.   Psychiatric: He has a normal mood and affect. His behavior is normal. Judgment and thought content normal.   Nursing note and vitals reviewed.        Medication Review:    Current Facility-Administered Medications:   •  8-hydroxyquinoline sulfate (BAG BALM) ointment, , Apply externally, Daily, Jose Luevano MD  •  acetaminophen (TYLENOL) tablet 650 mg, 650 mg, Oral, Q6H PRN, Andrea Chappell MD  •  albuterol (PROVENTIL) nebulizer solution 0.083% 2.5 mg/3mL, 2.5 mg, Nebulization, Q4H PRN, Andrea Chappell MD  •  albuterol (PROVENTIL) nebulizer solution 0.083% 2.5 mg/3mL, 2.5 mg, Nebulization, Q6H - RT, Devonte Lopez MD, 2.5 mg at  04/18/19 0659  •  bacitracin ointment, , Topical, Daily, Jose Luevano MD  •  chlorhexidine (PERIDEX) 0.12 % solution 15 mL, 15 mL, Mouth/Throat, Q12H, Andrea Chappell MD, 15 mL at 04/17/19 2021  •  dextrose (D50W) 25 g/ 50mL Intravenous Solution 50 mL, 50 mL, Intravenous, Q1H PRN, Catarino Grimm MD, 50 mL at 04/13/19 1925  •  enoxaparin (LOVENOX) syringe 40 mg, 40 mg, Subcutaneous, Q24H, Andrea Chappell MD, 40 mg at 04/18/19 0008  •  famotidine (PEPCID) injection 20 mg, 20 mg, Intravenous, Q12H, Andrea Chappell MD, 20 mg at 04/18/19 0852  •  fentaNYL citrate (PF) (SUBLIMAZE) injection 150 mcg, 150 mcg, Intravenous, Once, Andrea Chappell MD  •  midazolam (VERSED) injection 10 mg, 10 mg, Intravenous, Once, Andrea Chappell MD  •  nicotine (NICODERM CQ) 21 MG/24HR patch 1 patch, 1 patch, Transdermal, Q24H, Andrea Chappell MD, 1 patch at 04/18/19 0007  •  sodium chloride 0.9 % flush 10 mL, 10 mL, Intravenous, PRN, Lobo Lieberman MD  •  sodium chloride 0.9 % flush 10 mL, 10 mL, Intravenous, Q12H, Andrea Chappell MD  •  sodium chloride 0.9 % flush 10 mL, 10 mL, Intravenous, Q12H, Andrea Cahppell MD  •  sodium chloride 0.9 % flush 10 mL, 10 mL, Intravenous, Q12H, nAdrea Chappell MD, 10 mL at 04/17/19 1101  •  sodium chloride 0.9 % flush 10 mL, 10 mL, Intravenous, PRN, Andrea Chappell MD  •  sodium chloride 0.9 % flush 20 mL, 20 mL, Intravenous, PRN, Andrea Chappell MD  •  sodium chloride 0.9 % flush 3 mL, 3 mL, Intravenous, Q12H, Andrea Chappell MD, 3 mL at 04/17/19 2021  •  sodium chloride 0.9 % flush 3-10 mL, 3-10 mL, Intravenous, PRN, Misti, Andrea AUSTIN MD  •  sodium chloride 0.9 % infusion, 75 mL/hr, Intravenous, Continuous, Echendu, Juan Pablo POWERS MD, Last Rate: 75 mL/hr at 04/18/19 0336, 75 mL/hr at 04/18/19 0336    Results Review:  I have reviewed the labs, radiology results, and diagnostic studies.    Laboratory Data:   Results from last 7 days   Lab Units 04/18/19  0341 04/16/19  0332 04/15/19  0540  04/13/19  1847   SODIUM  mmol/L 141 138 138   < > 148*   POTASSIUM mmol/L 4.0 3.9 4.5   < > 3.8   CHLORIDE mmol/L 102 104 101   < > 104   CO2 mmol/L 27.0 22.0 21.0*   < > 26.0   BUN mg/dL 7 9 10   < > 27*   CREATININE mg/dL 0.53* 0.62* 0.61*   < > 1.54*   GLUCOSE mg/dL 155* 98 76   < > 49*   CALCIUM mg/dL 9.3 8.5* 8.5*   < > 9.9   BILIRUBIN mg/dL  --  0.4  --   --  1.2   ALK PHOS U/L  --  72  --   --  86   ALT (SGPT) U/L  --  56*  --   --  46*   AST (SGOT) U/L  --  53*  --   --  67*   ANION GAP mmol/L 12.0 12.0 16.0   < > 18.0    < > = values in this interval not displayed.     Estimated Creatinine Clearance: 224.7 mL/min (A) (by C-G formula based on SCr of 0.53 mg/dL (L)).  Results from last 7 days   Lab Units 04/14/19  0348 04/13/19  1847   MAGNESIUM mg/dL 2.5 2.7*         Results from last 7 days   Lab Units 04/18/19  0341 04/16/19  0332 04/15/19  0540 04/14/19  0348 04/13/19  1847   WBC 10*3/mm3 6.65 6.84 8.54 12.99* 18.15*   HEMOGLOBIN g/dL 14.9 14.2 14.2 14.1 16.5   HEMATOCRIT % 44.5 42.3 41.9 41.4 47.3   PLATELETS 10*3/mm3 254 242 168 237 345           Culture Data:   No results found for: BLOODCX  No results found for: URINECX  No results found for: RESPCX  No results found for: WOUNDCX  No results found for: STOOLCX  No components found for: BODYFLD    Radiology Data:   Imaging Results (last 24 hours)     ** No results found for the last 24 hours. **          ABG:  Results from last 7 days   Lab Units 04/18/19  0547   PH, ARTERIAL pH units 7.406   PO2 ART mm Hg 97.0   PCO2, ARTERIAL mm Hg 46.0*   HCO3 ART mmol/L 28.9*       I have reviewed the patient's current medications.     Assessment/Plan     Hospital Problem List:  Principal Problem:    Accidental drug overdose  Active Problems:    Amphetamine abuse (CMS/HCC)    -Accidental drug overdose complicated by acute metabolic encephalopathy and respiratory failure: Patient is status post extubation and remains hemodynamically stable.  Continue supplemental oxygen, nebulizers and  begin incentive spirometry.    Urine drug screen is positive for methamphetamine and opioids.    -Acute kidney injury: Resolved.    -Elevated LFTs were likely reactive and improving.  Hepatitis profile is remarkable and right upper quadrant ultrasound shows fatty liver.    - Nontraumatic rhabdomyolysis: Much improved as CK is down to 527 today.     -Nutrition: Patient did fail bedside swallowing assessment.  He is to remain n.p.o. pending assessment by speech language pathologist.     -Deconditioning : Consult PT and OT.    -Continue GI and DVT prophylaxis.    -Transfer out of CCU today.     Discharge Planning: In progress.    Juan Pablo Reeves MD   04/18/19   10:12 AM

## 2019-04-18 NOTE — PLAN OF CARE
Problem: Patient Care Overview  Goal: Plan of Care Review  Outcome: Ongoing (interventions implemented as appropriate)   04/18/19 1312   OTHER   Outcome Summary Swallow evaluation WFL; no s/s of aspiration. SLP initiated diet of regular solids and regular liquids. No tx recommended. RN and pt aware of diet initation.    Coping/Psychosocial   Plan of Care Reviewed With patient   Plan of Care Review   Progress improving

## 2019-04-18 NOTE — CONSULTS
"Adult Nutrition  Assessment    Patient Name:  Marko Walters  YOB: 1975  MRN: [id number]  Admit Date:  4/13/2019    Assessment Date:  4/18/2019    Comments:  The patient was admitted to the hospital d/t accidental overdose. He was extubated this a.m. He was seen by speech this afternoon and has been advanced to a regular diet with thin liquids. I will monitor intakes and make adjustments as necessary.     Reason for Assessment     Row Name 04/18/19 1351          Reason for Assessment    Reason For Assessment  follow-up protocol  (Pended)          Nutrition/Diet History     Row Name 04/18/19 1352          Nutrition/Diet History    Typical Food/Fluid Intake  Patient extubated today. Advanced to regular diet with thin liquids after speech eval.   (Pended)          Anthropometrics     Row Name 04/18/19 0600          Anthropometrics    Height  177.8 cm (70\")     Weight  86.6 kg (190 lb 14.7 oz) verified with 2 RNs        Ideal Body Weight (IBW)    Ideal Body Weight (IBW) (kg)  76.48     % Ideal Body Weight  113.23        Body Mass Index (BMI)    BMI (kg/m2)  27.45        IBW Adjustment, Para/Tetraplegia    5% Adjustment, Para (IBW)  72.66     10% Adjustment, Para (IBW)  68.83     10% Adjustment, Tetra (IBW)  68.83     15% Adjustment, Tetra (IBW)  65.01         Labs/Tests/Procedures/Meds     Row Name 04/18/19 1352          Labs/Procedures/Meds    Lab Results Reviewed  reviewed, pertinent  (Pended)         Diagnostic Tests/Procedures    Diagnostic Test/Procedure Reviewed  reviewed, pertinent  (Pended)      Diagnostic Test/Procedures Comments  extubated, TF removed   (Pended)         Medications    Pertinent Medications Reviewed  reviewed, pertinent  (Pended)      Pertinent Medications Comments  pepcid, versed   (Pended)            Estimated/Assessed Needs     Row Name 04/18/19 0600          Calculation Measurements    Height  177.8 cm (70\")         Nutrition Prescription Ordered     Row Name " "04/18/19 1353          Nutrition Prescription PO    Current PO Diet  Regular  (Pended)      Fluid Consistency  Thin  (Pended)          Evaluation of Received Nutrient/Fluid Intake     Row Name 04/18/19 0600          Calculation Measurements    Height  177.8 cm (70\")         Evaluation of Prescribed Nutrient/Fluid Intake     Row Name 04/18/19 0600          Calculation Measurements    Height  177.8 cm (70\")             Electronically signed by:  Lydia Villa  04/18/19 1:53 PM  "

## 2019-04-18 NOTE — PLAN OF CARE
Problem: Patient Care Overview  Goal: Plan of Care Review  Outcome: Ongoing (interventions implemented as appropriate)   04/18/19 1238   OTHER   Outcome Summary PT eval completed as co-eval with OT. Patient needed SBA with sitting balance and Min assist with ambulation due to impaired balance. Patient demo'd decreased understanding of his safety/balance impairement at this time. Patient can benefit from skilled PT to facilitate improved balance and safety with functional mobility. Patient may not need follow-up PT after acute care discharge if all PT goals are met, but need for outpatient PT is a possibility.   Coping/Psychosocial   Plan of Care Reviewed With patient

## 2019-04-18 NOTE — THERAPY DISCHARGE NOTE
Acute Care - Occupational Therapy Initial Eval/Discharge  [facility]     Patient Name: Marko Walters  : 1975  MRN: [id number]  Today's Date: 2019  Onset of Illness/Injury or Date of Surgery: 19  Date of Referral to OT: 19  Referring Physician: Juan Pablo Reeves MD      Admit Date: 2019       ICD-10-CM ICD-9-CM   1. Accidental drug overdose, initial encounter T50.901A 977.9     E858.9   2. Altered mental status, unspecified altered mental status type R41.82 780.97   3. Dysphagia, unspecified type R13.10 787.20   4. Impairment of balance R26.89 781.2   5. Impaired mobility and activities of daily living Z74.09 799.89     Patient Active Problem List   Diagnosis   • Non-traumatic rhabdomyolysis   • Amphetamine abuse (CMS/HCC)   • Acute pain of left shoulder   • Cervical radiculitis   • Accidental drug overdose     Past Medical History:   Diagnosis Date   • Hypertension      History reviewed. No pertinent surgical history.       OT ASSESSMENT FLOWSHEET (last 12 hours)      Occupational Therapy Evaluation     Row Name 19 1417                   OT Evaluation Time/Intention    Subjective Information  complains of  report just feels bad  -AS        Document Type  evaluation  -AS        Mode of Treatment  co-treatment;occupational therapy;physical therapy  -AS        Total Evaluation Minutes, Occupational Therapy  26  -AS        Patient Effort  good  -AS           General Information    Patient Profile Reviewed?  yes  -AS        Onset of Illness/Injury or Date of Surgery  19  -AS        Referring Physician  STEPHEN Reeves MD  -AS        Patient Observations  alert;cooperative;agree to therapy  -AS        Patient/Family Observations  friend at home  -AS        General Observations of Patient  supine in bed, O2 (2 lpm), tele, IV  -AS        Prior Level of Function  independent:;all household mobility;community mobility;gait;transfer;ADL's;home management  -AS        Equipment  Currently Used at Home  none  -AS        Pertinent History of Current Functional Problem  Pt is a 44 yo M admitted for accidental drug overdose. Found to have opiods and methamphetamine in system.    -AS        Existing Precautions/Restrictions  fall;oxygen therapy device and L/min  -AS        Equipment Issued to Patient  gait belt  -AS        Risks Reviewed  patient:;LOB;nausea/vomiting;dizziness;increased discomfort;change in vital signs;increased drainage;lines disloged  -AS        Benefits Reviewed  patient:;improve function;increase independence;increase strength;increase balance;decrease pain;increase knowledge;improve skin integrity;decrease risk of DVT  -AS           Relationship/Environment    Lives With  sibling(s) brother  -AS        Primary Roles/Responsibilities  wage earner, full time  -AS           Resource/Environmental Concerns    Current Living Arrangements  home/apartment/condo 1 level   -AS           Cognitive Assessment/Intervention- PT/OT    Orientation Status (Cognition)  oriented to;person;place;time uses white board as cue  -AS           Safety Issues, Functional Mobility    Safety Issues Affecting Function (Mobility)  impulsivity;safety precautions follow-through/compliance;problem solving  -AS        Impairments Affecting Function (Mobility)  balance;endurance/activity tolerance  -AS           Bed Mobility Assessment/Treatment    Bed Mobility Assessment/Treatment  supine-sit;sit-supine  -AS        Supine-Sit [place] (Bed Mobility)  contact guard  -AS        Sit-Supine [place] (Bed Mobility)  contact guard  -AS        Assistive Device (Bed Mobility)  bed rails;head of bed elevated  -AS           Functional Mobility    Functional Mobility- Ind. Level  minimum assist (75% patient effort)  -AS        Functional Mobility- Device  other (see comments) no AD  -AS        Functional Mobility- Safety Issues  other (see comments);balance decreased during turns unsteady; impulsive  -AS            Transfer Assessment/Treatment    Transfer Assessment/Treatment  sit-stand transfer;stand-sit transfer  -AS           Sit-Stand Transfer    Sit-Stand Falls (Transfers)  contact guard  -AS        Assistive Device (Sit-Stand Transfers)  other (see comments) no AD  -AS           Stand-Sit Transfer    Stand-Sit Falls (Transfers)  contact guard  -AS        Assistive Device (Stand-Sit Transfers)  other (see comments) no AD  -AS           ADL Assessment/Intervention    BADL Assessment/Intervention  lower body dressing  -AS           Lower Body Dressing Assessment/Training    Lower Body Dressing Falls Level  don;socks;other (see comments) SBA  -AS        Lower Body Dressing Position  unsupported sitting  -AS           BADL Safety/Performance    Impairments, BADL Safety/Performance  balance;endurance/activity tolerance  -AS           General ROM    GENERAL ROM COMMENTS  BUE WFL  -AS           MMT (Manual Muscle Testing)    General MMT Comments  BUE grossly 5/5  -AS           Dynamic Sitting Balance    Level of Falls, Reaches Outside Midline (Sitting, Dynamic Balance)  standby assist  -AS        Sitting Position, Reaches Outside Midline (Sitting, Dynamic Balance)  sitting on edge of bed  -AS           Static Standing Balance    Level of Falls (Unsupported Standing, Static Balance)  standby assist  -AS           Sensory Assessment/Intervention    Sensory General Assessment  no sensation deficits identified BUE  -AS           Positioning and Restraints    Pre-Treatment Position  in bed  -AS        Post Treatment Position  bed  -AS        In Bed  fowlers;call light within reach;encouraged to call for assist;exit alarm on;patient within staff view;side rails up x3  -AS           Pain Assessment    Additional Documentation  Pain Scale: Numbers Pre/Post-Treatment (Group)  -AS           Pain Scale: Numbers Pre/Post-Treatment    Pain Scale: Numbers, Pretreatment  0/10 - no pain  -AS        Pain  Scale: Numbers, Post-Treatment  0/10 - no pain  -AS           Wound 04/13/19 2140 Left posterior toe    Wound - Properties Group Date first assessed: 04/13/19 -PD Time first assessed: 2140 -PD Present On Admission : picture taken;yes  -PD Side: Left  -PD Orientation: posterior  -PD Location: toe  -PD, great toe  Type: --  -PD Stage, Pressure Injury: Stage 2  -PD, ruptured blister with skin flap        Wound 04/13/19 2140 Left lateral toe    Wound - Properties Group Date first assessed: 04/13/19 -PD Time first assessed: 2140 -PD Present On Admission : yes;picture taken  -PD Side: Left  -PD Orientation: lateral  -PD Location: toe  -PD, 5th digit  Stage, Pressure Injury: Stage 2  -PD, ruptured blister no skin flap        Wound 04/13/19 2140 Right posterior toe    Wound - Properties Group Date first assessed: 04/13/19 -PD Time first assessed: 2140 -PD Present On Admission : yes;picture taken  -PD Side: Right  -PD Orientation: posterior  -PD Location: toe  -PD, R. great toe  Stage, Pressure Injury: deep tissue injury  -PD, R great toe blister intact        Wound 04/13/19 2140 Right lateral foot    Wound - Properties Group Date first assessed: 04/13/19 -PD Time first assessed: 2140 -PD Present On Admission : yes  -PD Side: Right  -PD Orientation: lateral  -PD Location: foot  -PD Stage, Pressure Injury: Stage 2  -PD, ruptured blister with skin flap        Clinical Impression (OT)    Date of Referral to OT  04/18/19  -AS        OT Diagnosis  impaired mobility and ADLs  -AS        Patient/Family Goals Statement (OT Eval)  return home  -AS        Criteria for Skilled Therapeutic Interventions Met (OT Eval)  no problems identified which require skilled intervention  -AS        Therapy Frequency (OT Eval)  evaluation only  -AS        Care Plan Review (OT)  evaluation/treatment results reviewed  -AS        Anticipated Discharge Disposition (OT)  home  -AS           Vital Signs    Pre Systolic BP Rehab  108  -AS         Pre Treatment Diastolic BP  61  -AS        Post Systolic BP Rehab  116  -AS        Pretreatment Heart Rate (beats/min)  100  -AS        Pre SpO2 (%)  94  -AS        O2 Delivery Pre Treatment  supplemental O2  -AS        Intra SpO2 (%)  97  -AS        O2 Delivery Intra Treatment  room air  -AS        Post SpO2 (%)  93  -AS        O2 Delivery Post Treatment  room air  -AS        Pre Patient Position  Supine  -AS        Intra Patient Position  Standing  -AS        Post Patient Position  Sitting  -AS           Discharge Summary (Occupational Therapy)    Additional Documentation  Discharge Summary, OT Eval (Group)  -AS           Discharge Summary, OT Eval    Reason for Discharge (OT Discharge Summary)  no further needs identified  -AS           Living Environment    Home Accessibility  other (see comments);tub/shower is not walk in no stairs; tub/shower combo  -AS          User Key  (r) = Recorded By, (t) = Taken By, (c) = Cosigned By    Initials Name Effective Dates    Susan Richards RN 10/17/16 -     AS Alisha Kincaid OT 03/25/19 -               OT Recommendation and Plan  Outcome Summary/Treatment Plan (OT)  Anticipated Discharge Disposition (OT): home  Reason for Discharge (OT Discharge Summary): no further needs identified  Therapy Frequency (OT Eval): evaluation only  Plan of Care Review  Plan of Care Reviewed With: patient  Plan of Care Reviewed With: patient  Outcome Summary: OT eval completed; co-eval with PT. Prior to admission, pt independent with ADLs, IADLs, mobility, and was working. Pt currently requires SBA for sitting balance, SBA /CGA for sit<>stand and min A for ambulation (without AD). Pt demo unsteady gait and decreased balance. Pt also appears to have decreased awareness of deficits and need for assist. Pt participated in discussion regarding current fall risk and to not get up without staff assist. Pt verbalized understanding. Pt demo overall good strength and OT anticipates pt will  rapidly return to baseline function. Pt does appear to have balance deficits in which PT plans to address. Will d/c OT at this time no f/u OT rec at this time.      Rehab Goal Summary     Row Name 04/18/19 1418             Physical Therapy Goals    Transfer Goal Selection (PT)  transfer, PT goal 1  -LF      Gait Training Goal Selection (PT)  gait training, PT goal 1  -LF      Balance Goal Selection (PT)  balance, PT goal (free text)  -LF         Transfer Goal 1 (PT)    Activity/Assistive Device (Transfer Goal 1, PT)  sit-to-stand/stand-to-sit;bed-to-chair/chair-to-bed no AD  -LF      [place] Level/Cues Needed (Transfer Goal 1, PT)  independent  -LF      Time Frame (Transfer Goal 1, PT)  3 days  -LF      Barriers (Transfers Goal 1, PT)  safety awareness  -LF      Progress/Outcome (Transfer Goal 1, PT)  goal not met  -LF         Gait Training Goal 1 (PT)    Activity/Assistive Device (Gait Training Goal 1, PT)  improve balance and speed;increase endurance/gait distance;diminish gait deviation no AD  -LF      [place] Level (Gait Training Goal 1, PT)  independent  -LF      Distance (Gait Goal 1, PT)  500 feet or more  -LF      Time Frame (Gait Training Goal 1, PT)  4 days  -LF      Barriers (Gait Training Goal 1, PT)  safety awareness  -LF      Progress/Outcome (Gait Training Goal 1, PT)  goal not met  -LF         Balance Goal (PT)    Balance Goal (PT)  Patient will score 28/28 on Tinetti to demo improved balance and safety  -LF      Time Frame (Balance Goal, PT)  4 days  -LF      Barriers (Balance Goal, PT)  safety awareness  -LF      Progress/Outcome (Balance Goal, PT)  goal not met  -LF        User Key  (r) = Recorded By, (t) = Taken By, (c) = Cosigned By    Initials Name Provider Type Discipline    LF Freda, Olimpia, PT Physical Therapist PT          Outcome Measures     Row Name 04/18/19 1418 04/18/19 1417          How much help from another person do you currently need...    Turning from your back to your  "side while in flat bed without using bedrails?  --  4  -LF     Moving from lying on back to sitting on the side of a flat bed without bedrails?  --  3  -LF     Moving to and from a bed to a chair (including a wheelchair)?  --  3  -LF     Standing up from a chair using your arms (e.g., wheelchair, bedside chair)?  --  3  -LF     Climbing 3-5 steps with a railing?  --  3  -LF     To walk in hospital room?  --  3  -LF     AM-PAC 6 Clicks Score  --  19  -LF        How much help from another is currently needed...    Putting on and taking off regular lower body clothing?  3  -AS  --     Bathing (including washing, rinsing, and drying)  3  -AS  --     Toileting (which includes using toilet bed pan or urinal)  3  -AS  --     Putting on and taking off regular upper body clothing  4  -AS  --     Taking care of personal grooming (such as brushing teeth)  4  -AS  --     Eating meals  4  -AS  --     Score  21  -AS  --        Tinetti Assessment    Tinetti Assessment  --  yes  -LF     Sitting Balance  --  1  -LF     Arises  --  1  -LF     Attempts to Rise  --  2  -LF     Immediate Standing Balance (first 5 sec)  --  2  -LF     Standing Balance  --  2  -LF     Sternal Nudge (feet close together)  --  1  -LF     Eyes Closed (feet close together)  --  1  -LF     Turning 360 Degrees- Steps  --  1  -LF     Turning 360 Degrees- Steadiness  --  1  -LF     Sitting Down  --  1  -LF     Tinetti Balance Score  --  13  -LF     Gait Initiation (immediate after told \"go\")  --  1  -LF     Step Length- Right Swing  --  1  -LF     Step Length- Left Swing  --  1  -LF     Foot Clearance- Right Foot  --  1  -LF     Foot Clearance- Left Foot  --  1  -LF     Step Symmetry  --  1  -LF     Step Continuity  --  1  -LF     Path (excursion)  --  1  -LF     Trunk  --  2  -LF     Base of Support  --  1  -LF     Gait Score  --  11  -LF     Tinetti Total Score  --  24  -LF     Tinetti Assistive Device  --  other (see comments) no AD  -LF     Tinetti Assessment " Comments  --  24/28 = Low fall risk  -LF        Functional Assessment    Outcome Measure Options  AM-PAC 6 Clicks Daily Activity (OT)  -AS  AM-PAC 6 Clicks Basic Mobility (PT);Tinetti  -LF       User Key  (r) = Recorded By, (t) = Taken By, (c) = Cosigned By    Initials Name Provider Type    AS Alisha Kincaid, OT Occupational Therapist    LF Olimpia Barba, PT Physical Therapist          Time Calculation:   Time Calculation- OT     Row Name 04/18/19 1620             Time Calculation- OT    OT Start Time  1417  -AS      OT Stop Time  1443  -AS      OT Time Calculation (min)  26 min  -AS      OT Received On  04/18/19  -AS        User Key  (r) = Recorded By, (t) = Taken By, (c) = Cosigned By    Initials Name Provider Type    AS Alisha Kincaid OT Occupational Therapist        Therapy Suggested Charges     Code   Minutes Charges    None           Therapy Charges for Today     Code Description Service Date Service Provider Modifiers Qty    [id number] HC OT EVAL LOW COMPLEXITY 2 4/18/2019 Alisha Kincaid OT GO 1               OT Discharge Summary  Anticipated Discharge Disposition (OT): home  Reason for Discharge: other (comment)(PT to address balance deficits)  Outcomes Achieved: Refer to plan of care for updates on goals achieved    Alisha Kincaid OT  4/18/2019

## 2019-04-18 NOTE — PLAN OF CARE
Problem: Patient Care Overview  Goal: Plan of Care Review  Outcome: Ongoing (interventions implemented as appropriate)   04/18/19 0649   OTHER   Outcome Summary Pt remains on 3L nasal Cannula throughout the shift. Vitals remain stable throughout the shift. Pt is now aware of his situation.    Coping/Psychosocial   Plan of Care Reviewed With patient   Plan of Care Review   Progress improving     Goal: Individualization and Mutuality  Outcome: Ongoing (interventions implemented as appropriate)    Goal: Discharge Needs Assessment  Outcome: Ongoing (interventions implemented as appropriate)    Goal: Interprofessional Rounds/Family Conf  Outcome: Ongoing (interventions implemented as appropriate)      Problem: Restraint, Nonbehavioral (Nonviolent)  Goal: Rationale and Justification  Outcome: Outcome(s) achieved Date Met: 04/18/19    Goal: Nonbehavioral (Nonviolent) Restraint: Absence of Injury/Harm  Outcome: Outcome(s) achieved Date Met: 04/18/19    Goal: Nonbehavioral (Nonviolent) Restraint: Achievement of Discontinuation Criteria  Outcome: Outcome(s) achieved Date Met: 04/18/19    Goal: Nonbehavioral (Nonviolent) Restraint: Preservation of Dignity and Wellbeing  Outcome: Outcome(s) achieved Date Met: 04/18/19      Problem: Fall Risk (Adult)  Goal: Absence of Fall  Outcome: Ongoing (interventions implemented as appropriate)      Problem: Overdose, Ingestion/Inhalants (Adult)  Goal: Signs and Symptoms of Listed Potential Problems Will be Absent, Minimized or Managed (Overdose, Ingestion/Inhalants)  Outcome: Ongoing (interventions implemented as appropriate)      Problem: Skin Injury Risk (Adult)  Goal: Skin Health and Integrity  Outcome: Ongoing (interventions implemented as appropriate)      Problem: Pain, Acute (Adult)  Goal: Identify Related Risk Factors and Signs and Symptoms  Outcome: Ongoing (interventions implemented as appropriate)    Goal: Acceptable Pain Control/Comfort Level  Outcome: Ongoing (interventions  implemented as appropriate)

## 2019-04-18 NOTE — PLAN OF CARE
Problem: Patient Care Overview  Goal: Plan of Care Review  Outcome: Ongoing (interventions implemented as appropriate)   04/18/19 5057   OTHER   Outcome Summary The patient was extubated this a.m. He was evaluated by speech this afternoon and has been advanced to a regular diet with thin liquids. I will monitor intakes and make adjustments as necessary.    Coping/Psychosocial   Plan of Care Reviewed With patient;caregiver   Plan of Care Review   Progress improving

## 2019-04-18 NOTE — PLAN OF CARE
Problem: Patient Care Overview  Goal: Plan of Care Review  Outcome: Ongoing (interventions implemented as appropriate)   04/18/19 3554   OTHER   Outcome Summary OT eval completed; co-eval with PT. Prior to admission, pt independent with ADLs, IADLs, mobility, and was working. Pt currently requires SBA for sitting balance, SBA /CGA for sit<>stand and min A for ambulation (without AD). Pt demo unsteady gait and decreased balance. Pt also appears to have decreased awareness of deficits and need for assist. Pt participated in discussion regarding current fall risk and to not get up without staff assist. Pt verbalized understanding. Pt demo overall good strength and OT anticipates pt will rapidly return to baseline function. Pt does appear to have balance deficits in which PT plans to address. Will d/c OT at this time no f/u OT rec at this time.    Coping/Psychosocial   Plan of Care Reviewed With patient

## 2019-04-19 VITALS
RESPIRATION RATE: 16 BRPM | OXYGEN SATURATION: 94 % | SYSTOLIC BLOOD PRESSURE: 130 MMHG | HEART RATE: 91 BPM | HEIGHT: 70 IN | BODY MASS INDEX: 27.46 KG/M2 | WEIGHT: 191.8 LBS | TEMPERATURE: 97.4 F | DIASTOLIC BLOOD PRESSURE: 76 MMHG

## 2019-04-19 LAB
ANION GAP SERPL CALCULATED.3IONS-SCNC: 12 MMOL/L
BACTERIA SPEC AEROBE CULT: NORMAL
BACTERIA SPEC AEROBE CULT: NORMAL
BASOPHILS # BLD AUTO: 0.03 10*3/MM3 (ref 0–0.2)
BASOPHILS NFR BLD AUTO: 0.5 % (ref 0–1.5)
BUN BLD-MCNC: 16 MG/DL (ref 6–20)
BUN/CREAT SERPL: 26.2 (ref 7–25)
CALCIUM SPEC-SCNC: 9.4 MG/DL (ref 8.6–10.5)
CHLORIDE SERPL-SCNC: 101 MMOL/L (ref 98–107)
CO2 SERPL-SCNC: 28 MMOL/L (ref 22–29)
CREAT BLD-MCNC: 0.61 MG/DL (ref 0.76–1.27)
DEPRECATED RDW RBC AUTO: 43 FL (ref 37–54)
EOSINOPHIL # BLD AUTO: 0.13 10*3/MM3 (ref 0–0.4)
EOSINOPHIL NFR BLD AUTO: 2.3 % (ref 0.3–6.2)
ERYTHROCYTE [DISTWIDTH] IN BLOOD BY AUTOMATED COUNT: 12.8 % (ref 12.3–15.4)
GFR SERPL CREATININE-BSD FRML MDRD: 146 ML/MIN/1.73
GLUCOSE BLD-MCNC: 105 MG/DL (ref 65–99)
HCT VFR BLD AUTO: 44.3 % (ref 37.5–51)
HGB BLD-MCNC: 15.2 G/DL (ref 13–17.7)
IMM GRANULOCYTES # BLD AUTO: 0.05 10*3/MM3 (ref 0–0.05)
IMM GRANULOCYTES NFR BLD AUTO: 0.9 % (ref 0–0.5)
LYMPHOCYTES # BLD AUTO: 1.5 10*3/MM3 (ref 0.7–3.1)
LYMPHOCYTES NFR BLD AUTO: 27 % (ref 19.6–45.3)
MCH RBC QN AUTO: 31.6 PG (ref 26.6–33)
MCHC RBC AUTO-ENTMCNC: 34.3 G/DL (ref 31.5–35.7)
MCV RBC AUTO: 92.1 FL (ref 79–97)
MONOCYTES # BLD AUTO: 0.75 10*3/MM3 (ref 0.1–0.9)
MONOCYTES NFR BLD AUTO: 13.5 % (ref 5–12)
NEUTROPHILS # BLD AUTO: 3.09 10*3/MM3 (ref 1.4–7)
NEUTROPHILS NFR BLD AUTO: 55.8 % (ref 42.7–76)
NRBC BLD AUTO-RTO: 0 /100 WBC (ref 0–0)
PLATELET # BLD AUTO: 266 10*3/MM3 (ref 140–450)
PMV BLD AUTO: 9.3 FL (ref 6–12)
POTASSIUM BLD-SCNC: 4.2 MMOL/L (ref 3.5–5.2)
RBC # BLD AUTO: 4.81 10*6/MM3 (ref 4.14–5.8)
SODIUM BLD-SCNC: 141 MMOL/L (ref 136–145)
WBC NRBC COR # BLD: 5.55 10*3/MM3 (ref 3.4–10.8)

## 2019-04-19 PROCEDURE — 94760 N-INVAS EAR/PLS OXIMETRY 1: CPT

## 2019-04-19 PROCEDURE — 85025 COMPLETE CBC W/AUTO DIFF WBC: CPT | Performed by: INTERNAL MEDICINE

## 2019-04-19 PROCEDURE — 94799 UNLISTED PULMONARY SVC/PX: CPT

## 2019-04-19 PROCEDURE — 80048 BASIC METABOLIC PNL TOTAL CA: CPT | Performed by: INTERNAL MEDICINE

## 2019-04-19 RX ADMIN — ALBUTEROL SULFATE 2.5 MG: 2.5 SOLUTION RESPIRATORY (INHALATION) at 06:38

## 2019-04-19 RX ADMIN — BACITRACIN: 500 OINTMENT TOPICAL at 10:01

## 2019-04-19 RX ADMIN — FAMOTIDINE 20 MG: 20 TABLET ORAL at 10:01

## 2019-04-19 RX ADMIN — CHLORHEXIDINE GLUCONATE 0.12% ORAL RINSE 15 ML: 1.2 LIQUID ORAL at 10:02

## 2019-04-19 RX ADMIN — ACETAMINOPHEN 650 MG: 325 TABLET, FILM COATED ORAL at 06:50

## 2019-04-19 RX ADMIN — Medication: at 10:02

## 2019-04-19 NOTE — DISCHARGE INSTR - OTHER ORDERS
Outpatient Mental Health Providers    [place]    • [facility]   [address] [place], [place]  [phone]  Therapy and Psychiatry   Accepts all insurances, but no self-pay  • [facility]   [address] [place], [place]  [phone]  Therapy and Psychiatry   Will accept self-pay on sliding scale basis  [facility] Clinic is available Monday-Friday from 8:30 am - 4 pm  • [facility]  [address], [place]  [phone]  Only therapy  • [facility]  [address], [place]  [phone]  Only therapy   [place]  • [facility]  [address] [place], [place]  [phone]  Therapy and Psychiatry  Open Access Clinic is available Monday-Friday from 8:30 am - 4 pm              [place]    • [facility]  [address], [place]  [phone]  Therapy and Psychiatry  • [facility]  [address] [place], [place]  [phone]  Therapy and Psychiatry    [facility]    • [facility]  [address] [place] [address]  [phone]  Therapy and Psychiatry  • [facility]  [address], [place]  [phone]  Therapy and Family Medicine  Does accept self-pay on sliding scale basis    [facility]    • [facility]  [address], [place]  [phone]  Therapy and Psychiatry    [facility]    • [facility]  [address], [place]  [phone]  Therapy and Psychiatry      [facility]     • [facility]  [address], [place]  [phone]   Therapy and Psychiatry  Open Access Clinic is available Monday-Friday from 8:30 am - 4 pm    • [facility]  [address] [place] [address] [phone]

## 2019-04-19 NOTE — DISCHARGE SUMMARY
[facility] Medicine Services  DISCHARGE SUMMARY       Date of Admission: 4/13/2019  Date of Discharge:  4/19/2019  Primary Care Physician: Coty Boone APRN    Presenting Problem/History of Present Illness:  Accidental drug overdose, initial encounter [T50.901A]  Altered mental status, unspecified altered mental status type [R41.82]  Accidental drug overdose, initial encounter [T50.901A]     Final Discharge Diagnoses:  Active Hospital Problems    Diagnosis   • **Accidental drug overdose   • Amphetamine abuse (CMS/[facility])       Consults:   Consults     Date and Time Order Name Status Description    4/15/2019 1018 Inpatient Pulmonology Consult          Pertinent Test Results:   Lab Results (most recent)     Procedure Component Value Units Date/Time    Basic Metabolic Panel [[id number]]  (Abnormal) Collected:  04/19/19 0548    Specimen:  Blood Updated:  04/19/19 0639     Glucose 105 mg/dL      BUN 16 mg/dL      Creatinine 0.61 mg/dL      Sodium 141 mmol/L      Potassium 4.2 mmol/L      Chloride 101 mmol/L      CO2 28.0 mmol/L      Calcium 9.4 mg/dL      eGFR Non African Amer 146 mL/min/1.73      BUN/Creatinine Ratio 26.2     Anion Gap 12.0 mmol/L     Narrative:       GFR Normal >60  Chronic Kidney Disease <60  Kidney Failure <15    CBC & Differential [[id number]] Collected:  04/19/19 0548    Specimen:  Blood Updated:  04/19/19 0615    Narrative:       The following orders were created for panel order CBC & Differential.  Procedure                               Abnormality         Status                     ---------                               -----------         ------                     CBC Auto Differential[[id number]]        Abnormal            Final result                 Please view results for these tests on the individual orders.    CBC Auto Differential [[id number]]  (Abnormal) Collected:  04/19/19 0548    Specimen:  Blood Updated:  04/19/19 0615     WBC 5.55  10*3/mm3      RBC 4.81 10*6/mm3      Hemoglobin 15.2 g/dL      Hematocrit 44.3 %      MCV 92.1 fL      MCH 31.6 pg      MCHC 34.3 g/dL      RDW 12.8 %      RDW-SD 43.0 fl      MPV 9.3 fL      Platelets 266 10*3/mm3      Neutrophil % 55.8 %      Lymphocyte % 27.0 %      Monocyte % 13.5 %      Eosinophil % 2.3 %      Basophil % 0.5 %      Immature Grans % 0.9 %      Neutrophils, Absolute 3.09 10*3/mm3      Lymphocytes, Absolute 1.50 10*3/mm3      Monocytes, Absolute 0.75 10*3/mm3      Eosinophils, Absolute 0.13 10*3/mm3      Basophils, Absolute 0.03 10*3/mm3      Immature Grans, Absolute 0.05 10*3/mm3      nRBC 0.0 /100 WBC     Blood Culture - Blood, Arm, Left [[id number]] Collected:  04/13/19 2333    Specimen:  Blood from Arm, Left Updated:  04/19/19 0022     Blood Culture No growth at 5 days    Blood Culture - Blood, Arm, Left [[id number]] Collected:  04/13/19 2333    Specimen:  Blood from Arm, Left Updated:  04/19/19 0022     Blood Culture No growth at 5 days    POC Glucose Once [[id number]]  (Normal) Collected:  04/18/19 0614    Specimen:  Blood Updated:  04/18/19 0649     Glucose 77 mg/dL      Comment: : [id number] MACIEL JESSICAMeter ID: [id number]       Blood Gas, Arterial [[id number]]  (Abnormal) Collected:  04/18/19 0547    Specimen:  Arterial Blood Updated:  04/18/19 0557     Site Right Radial     Raj's Test Positive     pH, Arterial 7.406 pH units      pCO2, Arterial 46.0 mm Hg      Comment: 83 Value above reference range        pO2, Arterial 97.0 mm Hg      HCO3, Arterial 28.9 mmol/L      Comment: 83 Value above reference range        Base Excess, Arterial 3.3 mmol/L      Comment: 83 Value above reference range        O2 Saturation, Arterial 97.9 %      Barometric Pressure for Blood Gas 741 mmHg      Modality Nasal Cannula     Flow Rate 3.0 lpm      Ventilator Mode NA     Collected by RRT     Comment: Meter: [id number]     :  [id number]       Basic Metabolic Panel [[id number]]   (Abnormal) Collected:  04/18/19 0341    Specimen:  Blood Updated:  04/18/19 0411     Glucose 155 mg/dL      BUN 7 mg/dL      Creatinine 0.53 mg/dL      Sodium 141 mmol/L      Potassium 4.0 mmol/L      Chloride 102 mmol/L      CO2 27.0 mmol/L      Calcium 9.3 mg/dL      eGFR Non African Amer >150 mL/min/1.73      BUN/Creatinine Ratio 13.2     Anion Gap 12.0 mmol/L     Narrative:       GFR Normal >60  Chronic Kidney Disease <60  Kidney Failure <15    CK [[id number]]  (Abnormal) Collected:  04/18/19 0341    Specimen:  Blood Updated:  04/18/19 0402     Creatine Kinase 427 U/L     CBC & Differential [[id number]] Collected:  04/18/19 0341    Specimen:  Blood Updated:  04/18/19 0344    Narrative:       The following orders were created for panel order CBC & Differential.  Procedure                               Abnormality         Status                     ---------                               -----------         ------                     CBC Auto Differential[[id number]]        Abnormal            Final result                 Please view results for these tests on the individual orders.    CBC Auto Differential [[id number]]  (Abnormal) Collected:  04/18/19 0341    Specimen:  Blood Updated:  04/18/19 0344     WBC 6.65 10*3/mm3      RBC 4.74 10*6/mm3      Hemoglobin 14.9 g/dL      Hematocrit 44.5 %      MCV 93.9 fL      MCH 31.4 pg      MCHC 33.5 g/dL      RDW 12.9 %      RDW-SD 44.5 fl      MPV 9.5 fL      Platelets 254 10*3/mm3      Neutrophil % 70.5 %      Lymphocyte % 18.2 %      Monocyte % 9.8 %      Eosinophil % 0.9 %      Basophil % 0.3 %      Immature Grans % 0.3 %      Neutrophils, Absolute 4.69 10*3/mm3      Lymphocytes, Absolute 1.21 10*3/mm3      Monocytes, Absolute 0.65 10*3/mm3      Eosinophils, Absolute 0.06 10*3/mm3      Basophils, Absolute 0.02 10*3/mm3      Immature Grans, Absolute 0.02 10*3/mm3      nRBC 0.0 /100 WBC     POC Glucose Once [[id number]]  (Normal) Collected:  04/17/19 1833    Specimen:  Blood  Updated:  04/17/19 1845     Glucose 85 mg/dL      Comment: : [id number] TERESA Lal ID: [id number]       CK [[id number]]  (Abnormal) Collected:  04/17/19 0428    Specimen:  Blood Updated:  04/17/19 0533     Creatine Kinase 557 U/L     Extra Tubes [[id number]] Collected:  04/17/19 0429    Specimen:  Blood, Venous Line Updated:  04/17/19 0530    Narrative:       The following orders were created for panel order Extra Tubes.  Procedure                               Abnormality         Status                     ---------                               -----------         ------                     Lavender Top[[id number]]                                     Final result                 Please view results for these tests on the individual orders.    Lavender Top [[id number]] Collected:  04/17/19 0429    Specimen:  Blood Updated:  04/17/19 0530     Extra Tube hold for add-on     Comment: Auto resulted       Blood Gas, Arterial [[id number]]  (Abnormal) Collected:  04/16/19 0441    Specimen:  Arterial Blood Updated:  04/16/19 0457     Site Left Radial     Raj's Test Positive     pH, Arterial 7.410 pH units      pCO2, Arterial 38.5 mm Hg      pO2, Arterial 70.9 mm Hg      Comment: 84 Value below reference range        HCO3, Arterial 24.3 mmol/L      Base Excess, Arterial -0.1 mmol/L      Comment: 84 Value below reference range        O2 Saturation, Arterial 94.8 %      Barometric Pressure for Blood Gas 750 mmHg      Modality Ventilator     FIO2 30 %      Ventilator Mode AC     Set Tidal Volume 500     Set Mech Resp Rate 20.0     PEEP 5.0     Collected by NICOLE     Comment: Meter: [id number]     :  [id number]       Comprehensive Metabolic Panel [[id number]]  (Abnormal) Collected:  04/16/19 0332    Specimen:  Blood Updated:  04/16/19 0411     Glucose 98 mg/dL      BUN 9 mg/dL      Creatinine 0.62 mg/dL      Sodium 138 mmol/L      Potassium 3.9 mmol/L      Chloride 104 mmol/L      CO2 22.0 mmol/L       Calcium 8.5 mg/dL      Total Protein 6.2 g/dL      Albumin 3.20 g/dL      ALT (SGPT) 56 U/L      AST (SGOT) 53 U/L      Alkaline Phosphatase 72 U/L      Total Bilirubin 0.4 mg/dL      eGFR Non African Amer 143 mL/min/1.73      Globulin 3.0 gm/dL      A/G Ratio 1.1 g/dL      BUN/Creatinine Ratio 14.5     Anion Gap 12.0 mmol/L     Narrative:       GFR Normal >60  Chronic Kidney Disease <60  Kidney Failure <15    Hepatitis Panel, Acute [[id number]]  (Normal) Collected:  04/15/19 1212    Specimen:  Blood Updated:  04/15/19 2141     Hepatitis B Surface Ag Non-Reactive     Hep A IgM Non-Reactive     Hep B C IgM Non-Reactive     Hepatitis C Ab Non-Reactive    Magnesium [[id number]]  (Normal) Collected:  04/14/19 0348    Specimen:  Blood Updated:  04/14/19 0439     Magnesium 2.5 mg/dL     CBC (No Diff) [[id number]]  (Abnormal) Collected:  04/14/19 0348    Specimen:  Blood Updated:  04/14/19 0408     WBC 12.99 10*3/mm3      RBC 4.50 10*6/mm3      Hemoglobin 14.1 g/dL      Hematocrit 41.4 %      MCV 92.0 fL      MCH 31.3 pg      MCHC 34.1 g/dL      RDW 12.6 %      RDW-SD 42.0 fl      MPV 9.0 fL      Platelets 237 10*3/mm3     Calcium, Ionized [[id number]]  (Abnormal) Collected:  04/14/19 0348    Specimen:  Blood Updated:  04/14/19 0405     Ionized Calcium 4.28 mg/dL     Lactic Acid, Plasma [[id number]]  (Normal) Collected:  04/13/19 2333    Specimen:  Blood Updated:  04/14/19 0015     Lactate 1.0 mmol/L     [place] Draw [[id number]] Collected:  04/13/19 1847    Specimen:  Blood Updated:  04/13/19 2000    Narrative:       The following orders were created for panel order [place] Draw.  Procedure                               Abnormality         Status                     ---------                               -----------         ------                     Light Blue Top[[id number]]                                   Final result               Green Top (Gel)[[id number]]                                  Final result                Lavender Top[[id number]]                                     Final result               Gold Top - SST[[id number]]                                   Final result                 Please view results for these tests on the individual orders.    Light Blue Top [[id number]] Collected:  04/13/19 1847    Specimen:  Blood Updated:  04/13/19 2000     Extra Tube hold for add-on     Comment: Auto resulted       Green Top (Gel) [[id number]] Collected:  04/13/19 1847    Specimen:  Blood Updated:  04/13/19 2000     Extra Tube Hold for add-ons.     Comment: Auto resulted.       Lavender Top [[id number]] Collected:  04/13/19 1847    Specimen:  Blood Updated:  04/13/19 2000     Extra Tube hold for add-on     Comment: Auto resulted       Gold Top - SST [[id number]] Collected:  04/13/19 1847    Specimen:  Blood Updated:  04/13/19 2000     Extra Tube Hold for add-ons.     Comment: Auto resulted.       Urinalysis, Microscopic Only - Urine, Catheter [[id number]]  (Abnormal) Collected:  04/13/19 1858    Specimen:  Urine, Catheter Updated:  04/13/19 1928     RBC, UA 13-20 /HPF      WBC, UA 0-2 /HPF      Bacteria, UA None Seen /HPF      Squamous Epithelial Cells, UA None Seen /HPF      Hyaline Casts, UA None Seen /LPF      Sperm, UA Large/3+ /HPF      Methodology Manual Light Microscopy    Urine Drug Screen - Urine, Clean Catch [[id number]]  (Abnormal) Collected:  04/13/19 1858    Specimen:  Urine, Clean Catch Updated:  04/13/19 1924     Amphet/Methamphet, Screen Positive     Barbiturates Screen, Urine Negative     Benzodiazepine Screen, Urine Negative     Cocaine Screen, Urine Negative     Opiate Screen Positive     THC, Screen, Urine Negative     Methadone Screen, Urine Negative     Oxycodone Screen, Urine Negative    Narrative:       Negative Thresholds For Drugs Screened:     Amphetamines               500 ng/ml   Barbiturates               200 ng/ml   Benzodiazepines            100 ng/ml   Cocaine                    300 ng/ml   Methadone                   300 ng/ml   Opiates                    300 ng/ml   Oxycodone                  100 ng/ml   THC                        50 ng/ml    The Normal Value for all drugs tested is negative. This report includes final unconfirmed screening results to be used for medical treatment purposes only. Unconfirmed results must not be used for non-medical purposes such as employment or legal testing. Clinical consideration should be applied to any drug of abuse test, particulary when unconfirmed results are used.    Comprehensive Metabolic Panel [[id number]]  (Abnormal) Collected:  04/13/19 1847    Specimen:  Blood Updated:  04/13/19 1918     Glucose 49 mg/dL      BUN 27 mg/dL      Creatinine 1.54 mg/dL      Sodium 148 mmol/L      Potassium 3.8 mmol/L      Chloride 104 mmol/L      CO2 26.0 mmol/L      Calcium 9.9 mg/dL      Total Protein 8.2 g/dL      Albumin 4.70 g/dL      ALT (SGPT) 46 U/L      AST (SGOT) 67 U/L      Alkaline Phosphatase 86 U/L      Total Bilirubin 1.2 mg/dL      eGFR Non African Amer 50 mL/min/1.73      Globulin 3.5 gm/dL      A/G Ratio 1.3 g/dL      BUN/Creatinine Ratio 17.5     Anion Gap 18.0 mmol/L     Narrative:       GFR Normal >60  Chronic Kidney Disease <60  Kidney Failure <15    Acetaminophen Level [[id number]]  (Abnormal) Collected:  04/13/19 1847    Specimen:  Blood Updated:  04/13/19 1912     Acetaminophen <5.0 mcg/mL     Ethanol [[id number]] Collected:  04/13/19 1847    Specimen:  Blood Updated:  04/13/19 1912     Ethanol <10 mg/dL      Ethanol % <0.010 %     Salicylate Level [[id number]]  (Normal) Collected:  04/13/19 1847    Specimen:  Blood Updated:  04/13/19 1912     Salicylate <0.3 mg/dL     Urinalysis With Microscopic If Indicated (No Culture) - Urine, Catheter [[id number]]  (Abnormal) Collected:  04/13/19 1858    Specimen:  Urine, Catheter Updated:  04/13/19 1912     Color, UA Dark Yellow     Appearance, UA Cloudy     pH, UA 6.0     Specific Gravity, UA 1.032     Comment: Result obtained  by Refractometer        Glucose, UA Negative     Ketones, UA Trace     Bilirubin, UA Small (1+)     Blood, UA Small (1+)     Protein,  mg/dL (2+)     Leuk Esterase, UA Negative     Nitrite, UA Negative     Urobilinogen, UA 1.0 E.U./dL    Magnesium [[id number]]  (Abnormal) Collected:  04/13/19 1847    Specimen:  Blood Updated:  04/13/19 1912     Magnesium 2.7 mg/dL     Troponin [[id number]]  (Normal) Collected:  04/13/19 1847    Specimen:  Blood Updated:  04/13/19 1912     Troponin T 0.016 ng/mL     Narrative:       Troponin T Reference Range:  <= 0.03 ng/mL-   Negative for AMI  >0.03 ng/mL-     Abnormal for myocardial necrosis.  Clinicians would have to utilize clinical acumen, EKG, Troponin and serial changes to determine if it is an Acute Myocardial Infarction or myocardial injury due to an underlying chronic condition.     BNP [[id number]]  (Normal) Collected:  04/13/19 1847    Specimen:  Blood Updated:  04/13/19 1909     proBNP 245.6 pg/mL     Narrative:       Among patients with dyspnea, NT-proBNP is highly sensitive for the detection of acute congestive heart failure. In addition NT-proBNP of <300 pg/ml effectively rules out acute congestive heart failure with 99% negative predictive value.        Imaging Results (most recent)     Procedure Component Value Units Date/Time     Liver [[id number]] Collected:  04/15/19 1520     Updated:  04/16/19 0839    Narrative:         Ultrasound liver    HISTORY: Elevated liver function tests. Accidental poisoning.  Altered mental status.    Ultrasound examination of the right upper quadrant was performed.    COMPARISON: None. Correlation CT November 11, 2018.    Fatty infiltration of the liver.  No focal intrahepatic lesion.  The gallbladder is well displayed.  No calculi, wall thickening or pericholecystic fluid.  Common bile duct is within normal limits at 3.3 mm.  Images of the right kidney are unremarkable.  Right kidney 11.77 cm in length by 6.25 cm x 5.66 cm  transverse.  Pancreas partially obscured by bowel gas.      Impression:       CONCLUSION:  Fatty infiltration of the liver.    [id number]    Electronically signed by:  Garfield Turner MD  4/16/2019 8:38 AM CDT  Workstation: [id number]    XR Chest 1 View [[id number]] Collected:  04/14/19 0539     Updated:  04/14/19 0558    Narrative:         Chest single view on  4/14/2019     CLINICAL INDICATION: ET tube placement    COMPARISON: 4/13/2019    FINDINGS: ET tube tip is in the mid to upper thoracic trachea  approximately 5.1 cm above the level of the lynette. NG tube  extends below the diaphragm and below the level of this film.  Multiple overlying wires are noted. Heart is upper limits normal  for size. There is mild left lower lung atelectasis. Lungs are  otherwise clear. Pulmonary vascularity is within normal limits.      Impression:       New NG tube extends below the diaphragm with  otherwise no significant change.    Electronically signed by:  Mike Ennis  4/14/2019 5:57 AM  CDT Workstation: [id number]    CT Head Without Contrast [[id number]] Collected:  04/13/19 1943     Updated:  04/13/19 2014    Narrative:       PROCEDURE: CT HEAD WO CONTRAST    INDICATION:  Altered mental status    COMPARISON:  5/22/2018    TECHNIQUE:  CT head, without iv contrast.      This exam was performed according to our departmental  dose-optimization program, which includes automated exposure  control, adjustment of the mA and/or kV according to patient size  and/or use of iterative reconstruction technique.  DLP is 955.3     FINDINGS:    Patient is intubated.    The cerebral parenchyma is within normal limits for age.    There is preservation of the gray-white matter differentiation.      No focal parenchymal lesions.    There is no evidence of a hemorrhage or intracranial mass.    There is no midline shift.    The ventricles are normal in size and configuration.    The brain stem, cerebellum, globes,  and osseous structures are  within  normal limits.  Mucosal thickening in the ethmoid sinuses. There is an air-fluid  level in left maxillary sinus.  Severe circumferential mucosal thickening is present in the  leftward sphenoid sinus. Mastoids are well aerated and clear.      Impression:       No acute intracranial abnormality. Sinusitis as  above    If pain or symptoms persist beyond reasonable expectations, an  MRI examination is suggested as is deemed clinically appropriate.    Electronically signed by:  Allison Morelos MD  4/13/2019 8:12 PM CDT  Workstation: [id number]    XR Chest 1 View [[id number]] Collected:  04/13/19 1840     Updated:  04/13/19 1858    Narrative:       PROCEDURE: XR CHEST 1 VW    VIEWS:Single    INDICATION: Intubation    COMPARISON: CXR: 11/11/2018    FINDINGS:       - lines/tubes: Endotracheal tube is present with tip  approximately 1 cm cranial to the superior margin of the  clavicular heads    - cardiac: Borderline cardiomegaly    - mediastinum: Contour within normal limits.     - lungs: No evidence of a focal air space process, pulmonary  interstitial edema, nodule(s)/mass.     - pleura: No evidence of  fluid.      - osseous: Unremarkable for age.      Impression:         1. Endotracheal tube tip terminates approximately 1 cm superior  to the clavicular heads  2. Borderline cardiomegaly    Electronically signed by:  Allison Morelos MD  4/13/2019 6:57 PM CDT  Workstation: [id number]        Hospital Course:  The patient is a 43 y.o. male who presented to [facility] with agitation secondary to methamphetamine and opiate abuse.  Secondary to the after mentioned factors patient had to be sedated and intubated.  Patient was weaned off the vent without complication and then subsequently started on weaned off of supplemental oxygen.  Patient was evaluated by physical therapy occupational therapy who determined the patient was self-sufficient.  Patient was tolerating p.o. intake without issue prior to discharge.  He had no  "agitation or changes in behaviors.  He stated he felt well to go home and was given resources for drug cessation.  Tobacco cessation was also counseled.  Patient denied visual or auditory hallucinations, suicidal ideation, homicidal ideation.  Denies chest pain, shortness of breath, nausea, vomiting, diarrhea, constipation.  Had a regular bowel movement this morning.  No fevers or chills.  He was instructed to return with any concerning or worsening symptoms.    Condition on Discharge:  Stable, improved    Physical Exam on Discharge:  /76 (BP Location: Left arm, Patient Position: Lying)   Pulse 91   Temp 97.4 °F (36.3 °C) (Temporal)   Resp 16   Ht 177.8 cm (70\")   Wt 87 kg (191 lb 12.8 oz)   SpO2 94%   BMI 27.52 kg/m²   Physical Exam   Constitutional: He is oriented to person, place, and time. He appears well-developed and well-nourished. No distress.   HENT:   Head: Normocephalic and atraumatic.   Eyes: Conjunctivae and EOM are normal. Pupils are equal, round, and reactive to light.   Cardiovascular: Normal rate and regular rhythm.   Pulmonary/Chest: Effort normal and breath sounds normal. No stridor. No respiratory distress.   Abdominal: Soft. Bowel sounds are normal.   Musculoskeletal: He exhibits no edema.   Neurological: He is alert and oriented to person, place, and time.   Skin: Skin is warm and dry. Capillary refill takes less than 2 seconds. He is not diaphoretic.   Psychiatric: He has a normal mood and affect. His behavior is normal.     Discharge Disposition:  Home or Self Care    Discharge Medications:     Discharge Medications      Continue These Medications      Instructions Start Date   lisinopril 20 MG tablet  Commonly known as:  PRINIVIL,ZESTRIL   20 mg, Oral, Daily      tamsulosin 0.4 MG capsule 24 hr capsule  Commonly known as:  FLOMAX   1 capsule, Oral, Daily             Discharge Diet:   Diet Instructions     Advance Diet As Tolerated            Activity at Discharge:   Activity " Instructions     Activity as Tolerated            Discharge Care Plan/Instructions: Follow-up PCP, follow-up with drug rehabilitation and cessation as appropriate, return with any concerning worsening symptoms.        This document has been electronically signed by YOLI Nicholas on April 19, 2019 10:45 AM        Time: Please note that greater than 30 minutes spent on the discharge planning and summary this patient.

## 2019-04-19 NOTE — THERAPY DISCHARGE NOTE
Acute Care - Physical Therapy Discharge Summary  [facility]       Patient Name: Marko Walters  : 1975  MRN: [id number]    Today's Date: 2019  Onset of Illness/Injury or Date of Surgery: 19    Date of Referral to PT: 19  Referring Physician: Juan Pablo Reeves MD      Admit Date: 2019      PT Recommendation and Plan    Visit Dx:    ICD-10-CM ICD-9-CM   1. Accidental drug overdose, initial encounter T50.901A 977.9     E858.9   2. Altered mental status, unspecified altered mental status type R41.82 780.97   3. Dysphagia, unspecified type R13.10 787.20   4. Impairment of balance R26.89 781.2   5. Impaired mobility and activities of daily living Z74.09 799.89       Outcome Measures     Row Name 19 1418 19 1417          How much help from another person do you currently need...    Turning from your back to your side while in flat bed without using bedrails?  --  4  -LF     Moving from lying on back to sitting on the side of a flat bed without bedrails?  --  3  -LF     Moving to and from a bed to a chair (including a wheelchair)?  --  3  -LF     Standing up from a chair using your arms (e.g., wheelchair, bedside chair)?  --  3  -LF     Climbing 3-5 steps with a railing?  --  3  -LF     To walk in hospital room?  --  3  -LF     AM-PAC 6 Clicks Score  --  19  -LF        How much help from another is currently needed...    Putting on and taking off regular lower body clothing?  3  -AS  --     Bathing (including washing, rinsing, and drying)  3  -AS  --     Toileting (which includes using toilet bed pan or urinal)  3  -AS  --     Putting on and taking off regular upper body clothing  4  -AS  --     Taking care of personal grooming (such as brushing teeth)  4  -AS  --     Eating meals  4  -AS  --     Score  21  -AS  --        Tinetti Assessment    Tinetti Assessment  --  yes  -LF     Sitting Balance  --  1  -LF     Arises  --  1  -LF     Attempts to Rise  --  2  -LF      "Immediate Standing Balance (first 5 sec)  --  2  -LF     Standing Balance  --  2  -LF     Sternal Nudge (feet close together)  --  1  -LF     Eyes Closed (feet close together)  --  1  -LF     Turning 360 Degrees- Steps  --  1  -LF     Turning 360 Degrees- Steadiness  --  1  -LF     Sitting Down  --  1  -LF     Tinetti Balance Score  --  13  -LF     Gait Initiation (immediate after told \"go\")  --  1  -LF     Step Length- Right Swing  --  1  -LF     Step Length- Left Swing  --  1  -LF     Foot Clearance- Right Foot  --  1  -LF     Foot Clearance- Left Foot  --  1  -LF     Step Symmetry  --  1  -LF     Step Continuity  --  1  -LF     Path (excursion)  --  1  -LF     Trunk  --  2  -LF     Base of Support  --  1  -LF     Gait Score  --  11  -LF     Tinetti Total Score  --  24  -LF     Tinetti Assistive Device  --  other (see comments) no AD  -LF     Tinetti Assessment Comments  --  24/28 = Low fall risk  -LF        Functional Assessment    Outcome Measure Options  AM-PAC 6 Clicks Daily Activity (OT)  -AS  AM-PAC 6 Clicks Basic Mobility (PT);Tinetti  -LF       User Key  (r) = Recorded By, (t) = Taken By, (c) = Cosigned By    Initials Name Provider Type    AS Alisha Kincaid OT Occupational Therapist     Olimpia Barba, PT Physical Therapist                  Therapy Charges for Today     Code Description Service Date Service Provider Modifiers Qty    74013595991 HC PT EVAL MOD COMPLEXITY 2 4/18/2019 Olimpia Barba, PT GP 1          PT Discharge Summary  Anticipated Discharge Disposition (PT): home with assist  Reason for Discharge: Discharge from facility, Per MD order  Outcomes Achieved: Unable to make functional progress toward goals at this time, Refer to plan of care for updates on goals achieved  Discharge Destination: Home      Olimpia Barba PT   4/19/2019       "

## 2019-04-19 NOTE — PLAN OF CARE
Problem: Patient Care Overview  Goal: Plan of Care Review  Outcome: Ongoing (interventions implemented as appropriate)   04/19/19 0226   OTHER   Outcome Summary pt rested well during the night. vital signs stable. ambulated okay. no s/s of distress.    Coping/Psychosocial   Plan of Care Reviewed With patient   Plan of Care Review   Progress improving     Goal: Individualization and Mutuality  Outcome: Ongoing (interventions implemented as appropriate)    Goal: Discharge Needs Assessment  Outcome: Ongoing (interventions implemented as appropriate)      Problem: Fall Risk (Adult)  Goal: Absence of Fall  Outcome: Ongoing (interventions implemented as appropriate)      Problem: Overdose, Ingestion/Inhalants (Adult)  Goal: Signs and Symptoms of Listed Potential Problems Will be Absent, Minimized or Managed (Overdose, Ingestion/Inhalants)  Outcome: Ongoing (interventions implemented as appropriate)      Problem: Skin Injury Risk (Adult)  Goal: Skin Health and Integrity  Outcome: Ongoing (interventions implemented as appropriate)      Problem: Pain, Acute (Adult)  Goal: Acceptable Pain Control/Comfort Level  Outcome: Ongoing (interventions implemented as appropriate)

## 2019-04-19 NOTE — PLAN OF CARE
Problem: Patient Care Overview  Goal: Plan of Care Review  Outcome: Ongoing (interventions implemented as appropriate)  Vital signs stable. Pt maintaining normal O2 saturation on room air. Pt does not complain of pain. Pt expected to be discharged today.    Problem: Fall Risk (Adult)  Goal: Absence of Fall  Outcome: Ongoing (interventions implemented as appropriate)      Problem: Overdose, Ingestion/Inhalants (Adult)  Goal: Signs and Symptoms of Listed Potential Problems Will be Absent, Minimized or Managed (Overdose, Ingestion/Inhalants)  Outcome: Ongoing (interventions implemented as appropriate)      Problem: Skin Injury Risk (Adult)  Goal: Skin Health and Integrity  Outcome: Ongoing (interventions implemented as appropriate)

## 2019-04-20 ENCOUNTER — READMISSION MANAGEMENT (OUTPATIENT)
Dept: CALL CENTER | Facility: HOSPITAL | Age: 44
End: 2019-04-20

## 2019-04-20 NOTE — OUTREACH NOTE
Prep Survey      Responses   Facility patient discharged from?  [facility]   Is patient eligible?  No   What are the reasons patient is not eligible?  Other [overdose]   Does the patient have one of the following disease processes/diagnoses(primary or secondary)?  Other   Prep survey completed?  Yes          Rekha Winn RN

## 2019-04-22 NOTE — PAYOR COMM NOTE
"Marko Lauren (43 y.o. Male)     Date of Birth Social Security Number Address Home Phone MRN    1975  [address] [phone] [id number]    Yarsani Marital Status          Muslim        Admission Date Admission Type Admitting Provider Attending Provider Department, Room/Bed    4/13/19 Emergency Andrea Chappell MD  [address], 414/1    Discharge Date Discharge Disposition Discharge Destination        4/19/2019 Home or Self Care              Attending Provider:  (none)   Allergies:  Penicillins    Isolation:  None   Infection:  None   Code Status:  Prior    Ht:  177.8 cm (70\")   Wt:  87 kg (191 lb 12.8 oz)    Admission Cmt:  None   Principal Problem:  Accidental drug overdose [T50.901A]                 Active Insurance as of 4/13/2019     Primary Coverage     Payor Plan Insurance Group Employer/Plan Group    [employer] MEDICA[employer] [id number]     Payor Plan Address Payor Plan Phone Number Payor Plan Fax Number Effective Dates    PO  [phone]  2/20/2017 - None Entered    [facility] [id number]       Subscriber Name Subscriber Birth Date Member ID       MARKO LAUREN 1975 [id number]                 Emergency Contacts      (Rel.) Home Phone Work Phone Mobile Phone    Lianne Lauren (Spouse) [phone] -- [phone]    Cullen Garner (Brother) -- -- [phone]    Christi Tello (Sister) -- -- [phone]            Discharge Order (From admission, onward)    Start     Ordered    04/19/19 1026  Discharge patient  Once     Expected Discharge Date:  04/19/19    Discharge Disposition:  Home or Self Care    Physician of Record for Attribution - Please select from Treatment Team:  ZACHARY STYLES [[id number]]    Review needed by CMO to determine Physician of Record:  No       Question Answer Comment   Physician of Record for Attribution - Please select from Treatment Team ZACHARY STYLES    Review needed " by O to determine Physician of Record No        04/19/19 102

## 2019-07-16 ENCOUNTER — TRANSCRIBE ORDERS (OUTPATIENT)
Dept: ADMINISTRATIVE | Facility: HOSPITAL | Age: 44
End: 2019-07-16

## 2019-07-16 DIAGNOSIS — R10.10 UPPER ABDOMINAL PAIN: Primary | ICD-10-CM

## 2019-07-19 ENCOUNTER — APPOINTMENT (OUTPATIENT)
Dept: CT IMAGING | Facility: HOSPITAL | Age: 44
End: 2019-07-19

## 2019-07-19 ENCOUNTER — HOSPITAL ENCOUNTER (EMERGENCY)
Facility: HOSPITAL | Age: 44
Discharge: HOME | End: 2019-07-19
Admitting: EMERGENCY MEDICINE

## 2019-07-19 VITALS
HEIGHT: 70 IN | OXYGEN SATURATION: 98 % | SYSTOLIC BLOOD PRESSURE: 128 MMHG | TEMPERATURE: 97.7 F | DIASTOLIC BLOOD PRESSURE: 85 MMHG | HEART RATE: 69 BPM | WEIGHT: 220 LBS | RESPIRATION RATE: 20 BRPM | BODY MASS INDEX: 31.5 KG/M2

## 2019-07-19 DIAGNOSIS — R55 SYNCOPE, UNSPECIFIED SYNCOPE TYPE: ICD-10-CM

## 2019-07-19 DIAGNOSIS — I10 ESSENTIAL HYPERTENSION: Primary | ICD-10-CM

## 2019-07-19 LAB
ALBUMIN SERPL-MCNC: 4.3 G/DL (ref 3.5–5)
ALBUMIN/GLOB SERPL: 1.4 G/DL (ref 1.1–2.5)
ALP SERPL-CCNC: 63 U/L (ref 24–120)
ALT SERPL W P-5'-P-CCNC: 55 U/L (ref 0–54)
ANION GAP SERPL CALCULATED.3IONS-SCNC: 5 MMOL/L (ref 4–13)
AST SERPL-CCNC: 34 U/L (ref 7–45)
BASOPHILS # BLD AUTO: 0.03 10*3/MM3 (ref 0–0.2)
BASOPHILS NFR BLD AUTO: 0.5 % (ref 0–2)
BILIRUB SERPL-MCNC: 0.5 MG/DL (ref 0.1–1)
BUN BLD-MCNC: 18 MG/DL (ref 5–21)
BUN/CREAT SERPL: 25.7 (ref 7–25)
CALCIUM SPEC-SCNC: 10.1 MG/DL (ref 8.4–10.4)
CHLORIDE SERPL-SCNC: 102 MMOL/L (ref 98–110)
CO2 SERPL-SCNC: 29 MMOL/L (ref 24–31)
CREAT BLD-MCNC: 0.7 MG/DL (ref 0.5–1.4)
DEPRECATED RDW RBC AUTO: 46.2 FL (ref 40–54)
EOSINOPHIL # BLD AUTO: 0.18 10*3/MM3 (ref 0–0.7)
EOSINOPHIL NFR BLD AUTO: 3.2 % (ref 0–4)
ERYTHROCYTE [DISTWIDTH] IN BLOOD BY AUTOMATED COUNT: 13.2 % (ref 12–15)
GFR SERPL CREATININE-BSD FRML MDRD: 124 ML/MIN/1.73
GLOBULIN UR ELPH-MCNC: 3 GM/DL
GLUCOSE BLD-MCNC: 93 MG/DL (ref 70–100)
HCT VFR BLD AUTO: 50 % (ref 40–52)
HGB BLD-MCNC: 17.3 G/DL (ref 14–18)
HOLD SPECIMEN: NORMAL
HOLD SPECIMEN: NORMAL
IMM GRANULOCYTES # BLD AUTO: 0.04 10*3/MM3 (ref 0–0.05)
IMM GRANULOCYTES NFR BLD AUTO: 0.7 % (ref 0–5)
LYMPHOCYTES # BLD AUTO: 2.47 10*3/MM3 (ref 0.72–4.86)
LYMPHOCYTES NFR BLD AUTO: 43.3 % (ref 15–45)
MCH RBC QN AUTO: 32.6 PG (ref 28–32)
MCHC RBC AUTO-ENTMCNC: 34.6 G/DL (ref 33–36)
MCV RBC AUTO: 94.3 FL (ref 82–95)
MONOCYTES # BLD AUTO: 0.53 10*3/MM3 (ref 0.19–1.3)
MONOCYTES NFR BLD AUTO: 9.3 % (ref 4–12)
NEUTROPHILS # BLD AUTO: 2.45 10*3/MM3 (ref 1.87–8.4)
NEUTROPHILS NFR BLD AUTO: 43 % (ref 39–78)
NRBC BLD AUTO-RTO: 0 /100 WBC (ref 0–0.2)
PLATELET # BLD AUTO: 228 10*3/MM3 (ref 130–400)
PMV BLD AUTO: 9.7 FL (ref 6–12)
POTASSIUM BLD-SCNC: 4.4 MMOL/L (ref 3.5–5.3)
PROT SERPL-MCNC: 7.3 G/DL (ref 6.3–8.7)
RBC # BLD AUTO: 5.3 10*6/MM3 (ref 4.8–5.9)
SODIUM BLD-SCNC: 136 MMOL/L (ref 135–145)
TROPONIN I SERPL-MCNC: <0.012 NG/ML (ref 0–0.03)
WBC NRBC COR # BLD: 5.7 10*3/MM3 (ref 4.8–10.8)
WHOLE BLOOD HOLD SPECIMEN: NORMAL
WHOLE BLOOD HOLD SPECIMEN: NORMAL

## 2019-07-19 PROCEDURE — 80053 COMPREHEN METABOLIC PANEL: CPT | Performed by: NURSE PRACTITIONER

## 2019-07-19 PROCEDURE — 93010 ELECTROCARDIOGRAM REPORT: CPT | Performed by: INTERNAL MEDICINE

## 2019-07-19 PROCEDURE — 93005 ELECTROCARDIOGRAM TRACING: CPT | Performed by: NURSE PRACTITIONER

## 2019-07-19 PROCEDURE — 70450 CT HEAD/BRAIN W/O DYE: CPT

## 2019-07-19 PROCEDURE — 84484 ASSAY OF TROPONIN QUANT: CPT | Performed by: NURSE PRACTITIONER

## 2019-07-19 PROCEDURE — 99284 EMERGENCY DEPT VISIT MOD MDM: CPT

## 2019-07-19 PROCEDURE — 85025 COMPLETE CBC W/AUTO DIFF WBC: CPT | Performed by: NURSE PRACTITIONER

## 2019-07-19 RX ORDER — LISINOPRIL 20 MG/1
20 TABLET ORAL DAILY
Qty: 30 TABLET | Refills: 0 | OUTPATIENT
Start: 2019-07-19 | End: 2020-03-03

## 2019-07-19 RX ORDER — DIVALPROEX SODIUM 500 MG/1
500 TABLET, DELAYED RELEASE ORAL 2 TIMES DAILY
COMMUNITY
End: 2019-10-28 | Stop reason: HOSPADM

## 2019-07-19 RX ORDER — IBUPROFEN 400 MG/1
400 TABLET ORAL ONCE
Status: COMPLETED | OUTPATIENT
Start: 2019-07-19 | End: 2019-07-19

## 2019-07-19 RX ADMIN — IBUPROFEN 400 MG: 400 TABLET, FILM COATED ORAL at 17:30

## 2019-07-24 ENCOUNTER — HOSPITAL ENCOUNTER (OUTPATIENT)
Dept: NUCLEAR MEDICINE | Facility: HOSPITAL | Age: 44
End: 2019-07-24

## 2019-07-25 ENCOUNTER — HOSPITAL ENCOUNTER (OUTPATIENT)
Dept: NUCLEAR MEDICINE | Facility: HOSPITAL | Age: 44
Discharge: HOME | End: 2019-07-25

## 2019-07-25 PROCEDURE — 78226 HEPATOBILIARY SYSTEM IMAGING: CPT

## 2019-07-25 PROCEDURE — 0 TECHNETIUM TC 99M MEBROFENIN KIT: Performed by: NURSE PRACTITIONER

## 2019-07-25 PROCEDURE — A9537 TC99M MEBROFENIN: HCPCS | Performed by: NURSE PRACTITIONER

## 2019-07-25 RX ORDER — KIT FOR THE PREPARATION OF TECHNETIUM TC 99M MEBROFENIN 45 MG/10ML
1 INJECTION, POWDER, LYOPHILIZED, FOR SOLUTION INTRAVENOUS
Status: COMPLETED | OUTPATIENT
Start: 2019-07-25 | End: 2019-07-25

## 2019-07-25 RX ADMIN — MEBROFENIN 1 DOSE: 45 INJECTION, POWDER, LYOPHILIZED, FOR SOLUTION INTRAVENOUS at 13:56

## 2019-10-28 ENCOUNTER — APPOINTMENT (OUTPATIENT)
Dept: GENERAL RADIOLOGY | Facility: HOSPITAL | Age: 44
End: 2019-10-28

## 2019-10-28 ENCOUNTER — HOSPITAL ENCOUNTER (EMERGENCY)
Facility: HOSPITAL | Age: 44
Discharge: HOME | End: 2019-10-28
Admitting: EMERGENCY MEDICINE

## 2019-10-28 VITALS
DIASTOLIC BLOOD PRESSURE: 68 MMHG | BODY MASS INDEX: 32.35 KG/M2 | WEIGHT: 226 LBS | RESPIRATION RATE: 20 BRPM | SYSTOLIC BLOOD PRESSURE: 110 MMHG | OXYGEN SATURATION: 96 % | HEIGHT: 70 IN | TEMPERATURE: 98 F | HEART RATE: 86 BPM

## 2019-10-28 DIAGNOSIS — J06.9 VIRAL URI WITH COUGH: Primary | ICD-10-CM

## 2019-10-28 LAB
FLUAV AG NPH QL: NEGATIVE
FLUBV AG NPH QL IA: NEGATIVE

## 2019-10-28 PROCEDURE — 96372 THER/PROPH/DIAG INJ SC/IM: CPT

## 2019-10-28 PROCEDURE — 71046 X-RAY EXAM CHEST 2 VIEWS: CPT

## 2019-10-28 PROCEDURE — 87804 INFLUENZA ASSAY W/OPTIC: CPT | Performed by: PHYSICIAN ASSISTANT

## 2019-10-28 PROCEDURE — 99283 EMERGENCY DEPT VISIT LOW MDM: CPT

## 2019-10-28 PROCEDURE — 25010000002 KETOROLAC TROMETHAMINE PER 15 MG: Performed by: PHYSICIAN ASSISTANT

## 2019-10-28 RX ORDER — KETOROLAC TROMETHAMINE 30 MG/ML
30 INJECTION, SOLUTION INTRAMUSCULAR; INTRAVENOUS ONCE
Status: COMPLETED | OUTPATIENT
Start: 2019-10-28 | End: 2019-10-28

## 2019-10-28 RX ORDER — BENZONATATE 200 MG/1
200 CAPSULE ORAL 3 TIMES DAILY PRN
Qty: 10 CAPSULE | Refills: 0 | Status: SHIPPED | OUTPATIENT
Start: 2019-10-28 | End: 2020-01-24

## 2019-10-28 RX ADMIN — KETOROLAC TROMETHAMINE 30 MG: 30 INJECTION, SOLUTION INTRAMUSCULAR at 13:46
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
## 2020-01-24 ENCOUNTER — HOSPITAL ENCOUNTER (OUTPATIENT)
Dept: GENERAL RADIOLOGY | Facility: HOSPITAL | Age: 45
Discharge: HOME | End: 2020-01-24
Admitting: NURSE PRACTITIONER

## 2020-01-24 PROCEDURE — 71046 X-RAY EXAM CHEST 2 VIEWS: CPT

## 2020-06-16 ENCOUNTER — TRANSCRIBE ORDERS (OUTPATIENT)
Dept: ADMINISTRATIVE | Facility: HOSPITAL | Age: 45
End: 2020-06-16

## 2020-06-16 ENCOUNTER — HOSPITAL ENCOUNTER (OUTPATIENT)
Dept: GENERAL RADIOLOGY | Facility: HOSPITAL | Age: 45
Discharge: HOME | End: 2020-06-16

## 2020-06-16 DIAGNOSIS — M25.512 LEFT SHOULDER PAIN, UNSPECIFIED CHRONICITY: ICD-10-CM

## 2020-06-16 DIAGNOSIS — M25.512 LEFT SHOULDER PAIN, UNSPECIFIED CHRONICITY: Primary | ICD-10-CM

## 2020-06-16 PROCEDURE — 73030 X-RAY EXAM OF SHOULDER: CPT

## 2020-07-08 ENCOUNTER — TRANSCRIBE ORDERS (OUTPATIENT)
Dept: ADMINISTRATIVE | Facility: HOSPITAL | Age: 45
End: 2020-07-08

## 2020-07-08 DIAGNOSIS — M25.512 LEFT SHOULDER PAIN, UNSPECIFIED CHRONICITY: Primary | ICD-10-CM

## 2020-07-09 ENCOUNTER — HOSPITAL ENCOUNTER (OUTPATIENT)
Dept: MRI IMAGING | Facility: HOSPITAL | Age: 45
Discharge: HOME | End: 2020-07-09

## 2020-07-09 ENCOUNTER — HOSPITAL ENCOUNTER (OUTPATIENT)
Dept: GENERAL RADIOLOGY | Facility: HOSPITAL | Age: 45
Discharge: HOME | End: 2020-07-09
Admitting: NURSE PRACTITIONER

## 2020-07-09 VITALS — DIASTOLIC BLOOD PRESSURE: 84 MMHG | SYSTOLIC BLOOD PRESSURE: 132 MMHG | OXYGEN SATURATION: 97 % | HEART RATE: 80 BPM

## 2020-07-09 DIAGNOSIS — M25.512 LEFT SHOULDER PAIN, UNSPECIFIED CHRONICITY: ICD-10-CM

## 2020-07-09 PROCEDURE — 77002 NEEDLE LOCALIZATION BY XRAY: CPT

## 2020-07-09 PROCEDURE — 0 IOPAMIDOL 41 % SOLUTION: Performed by: NURSE PRACTITIONER

## 2020-07-09 PROCEDURE — 73222 MRI JOINT UPR EXTREM W/DYE: CPT

## 2020-07-09 PROCEDURE — 0 GADOBENATE DIMEGLUMINE 529 MG/ML SOLUTION: Performed by: NURSE PRACTITIONER

## 2020-07-09 PROCEDURE — A9577 INJ MULTIHANCE: HCPCS | Performed by: NURSE PRACTITIONER

## 2020-07-09 RX ORDER — LIDOCAINE HYDROCHLORIDE 10 MG/ML
5 INJECTION, SOLUTION EPIDURAL; INFILTRATION; INTRACAUDAL; PERINEURAL ONCE
Status: COMPLETED | OUTPATIENT
Start: 2020-07-09 | End: 2020-07-09

## 2020-07-09 RX ADMIN — LIDOCAINE HYDROCHLORIDE 5 ML: 10 INJECTION, SOLUTION EPIDURAL; INFILTRATION; INTRACAUDAL; PERINEURAL at 12:56

## 2020-07-09 RX ADMIN — IOPAMIDOL 10 ML: 408 INJECTION, SOLUTION INTRATHECAL at 12:57

## 2020-07-09 RX ADMIN — GADOBENATE DIMEGLUMINE 5 ML: 529 INJECTION, SOLUTION INTRAVENOUS at 12:57

## 2020-07-28 ENCOUNTER — HOSPITAL ENCOUNTER (OUTPATIENT)
Dept: GENERAL RADIOLOGY | Facility: HOSPITAL | Age: 45
Discharge: HOME | End: 2020-07-28
Admitting: NURSE PRACTITIONER

## 2020-07-28 PROCEDURE — U0003 INFECTIOUS AGENT DETECTION BY NUCLEIC ACID (DNA OR RNA); SEVERE ACUTE RESPIRATORY SYNDROME CORONAVIRUS 2 (SARS-COV-2) (CORONAVIRUS DISEASE [COVID-19]), AMPLIFIED PROBE TECHNIQUE, MAKING USE OF HIGH THROUGHPUT TECHNOLOGIES AS DESCRIBED BY CMS-2020-01-R: HCPCS | Performed by: NURSE PRACTITIONER

## 2020-07-28 PROCEDURE — 71046 X-RAY EXAM CHEST 2 VIEWS: CPT

## 2020-07-31 ENCOUNTER — TELEPHONE (OUTPATIENT)
Dept: URGENT CARE | Facility: CLINIC | Age: 45
End: 2020-07-31

## 2020-07-31 NOTE — TELEPHONE ENCOUNTER
Patient notified of results.  Patient is not feeling any better at this time.    COVID-19 Test Result   Telephone Encounter    Patient Name: Marko Walters   : 1975   MRN: [id number]     SARS-CoV-2, CHRISTOPHE   Date Value Ref Range Status   2020 Not Detected Not Detected Final     Comment:     This test was developed and its performance characteristics determined  by [employer]. This test has not been FDA cleared or  approved. This test has been authorized by FDA under an Emergency Use  Authorization (EUA). This test is only authorized for the duration of  time the declaration that circumstances exist justifying the  authorization of the emergency use of in vitro diagnostic tests for  detection of SARS-CoV-2 virus and/or diagnosis of COVID-19 infection  under section 564(b)(1) of the Act, 21 U.S.C. 360bbb-3(b)(1), unless  the authorization is terminated or revoked sooner.  When diagnostic testing is negative, the possibility of a false  negative result should be considered in the context of a patient's  recent exposures and the presence of clinical signs and symptoms  consistent with COVID-19. An individual without symptoms of COVID-19  and who is not shedding SARS-CoV-2 virus would expect to have a  negative (not detected) result in this assay.        Patient was counseled as follows:  • (-) negative COVID-19 test result with or without symptoms   • The test is not perfect, so there is a chance it could be falsely negative or the virus level is too low for detection due to being very early in the infectious process.   • The optimal duration of home isolation is uncertain. The [place] Centers for Disease Control and Prevention (CDC) has issued recommendations on discontinuation of home isolation.   • For this reason, Marko is strongly encouraged to practice the safest standards in protecting their health and others given the current pandemic concerns. He is advised to:    o Practice social distancing in the community by staying at least 6 feet away from people   o Encouraged to use face mask while out in public   o Continue to wash their hands frequently with soap and hot water, and cover their mouth while coughing.   • If Marko is asymptomatic, he should self isolate for a total of 14 days from time of potential contact with Covid-19.   • If Marko is symptomatic then he may discontinue home isolation when the following criteria are met:   o At least seven days have passed since symptoms first appeared AND   o At least three days (72 hours) have passed since recovery of symptoms (defined as resolution of fever without the use of fever-reducing medications and improvement in respiratory symptoms [e.g., cough, shortness of breath])   • If Marko has been asymptomatic but then develops non-emergent symptoms such as mild increased shortness of breath, fever, cough, or for other questions, he  was asked to please call their primary care physician’s office or the [place] [facility] at [phone].   · Questions were engaged and answered to the best of my ability. He         expressed verbal understanding of their test results and my advice.    Primary Care Physician verified as being: Larry Perry DO      Electronically signed by GEORGETTE Macdonald, 07/31/20, 9:55 AM.

## 2020-08-27 ENCOUNTER — LAB (OUTPATIENT)
Dept: LAB | Facility: HOSPITAL | Age: 45
End: 2020-08-27

## 2020-08-27 ENCOUNTER — TRANSCRIBE ORDERS (OUTPATIENT)
Dept: ADMINISTRATIVE | Facility: HOSPITAL | Age: 45
End: 2020-08-27

## 2020-08-27 DIAGNOSIS — R74.8 ABNORMAL LEVELS OF OTHER SERUM ENZYMES: ICD-10-CM

## 2020-08-27 DIAGNOSIS — S46.012A STRAIN OF MUSC/TEND THE ROTATOR CUFF OF LEFT SHOULDER, INIT: ICD-10-CM

## 2020-08-27 DIAGNOSIS — E87.5 HYPERKALEMIA: ICD-10-CM

## 2020-08-27 DIAGNOSIS — Z01.812 ENCOUNTER FOR PREPROCEDURAL LABORATORY EXAMINATION: ICD-10-CM

## 2020-08-27 DIAGNOSIS — E87.5 HYPERKALEMIA: Primary | ICD-10-CM

## 2020-08-27 LAB
ALBUMIN SERPL-MCNC: 4.4 G/DL (ref 3.5–5.2)
ALBUMIN/GLOB SERPL: 1.5 G/DL
ALP SERPL-CCNC: 65 U/L (ref 39–117)
ALT SERPL W P-5'-P-CCNC: 20 U/L (ref 1–41)
ANION GAP SERPL CALCULATED.3IONS-SCNC: 11.8 MMOL/L (ref 5–15)
AST SERPL-CCNC: 22 U/L (ref 1–40)
BILIRUB CONJ SERPL-MCNC: <0.2 MG/DL (ref 0–0.3)
BILIRUB SERPL-MCNC: 0.5 MG/DL (ref 0–1.2)
BUN SERPL-MCNC: 15 MG/DL (ref 6–20)
BUN/CREAT SERPL: 18.8 (ref 7–25)
CALCIUM SPEC-SCNC: 9.8 MG/DL (ref 8.6–10.5)
CHLORIDE SERPL-SCNC: 103 MMOL/L (ref 98–107)
CO2 SERPL-SCNC: 25.2 MMOL/L (ref 22–29)
CREAT SERPL-MCNC: 0.8 MG/DL (ref 0.76–1.27)
GFR SERPL CREATININE-BSD FRML MDRD: 106 ML/MIN/1.73
GLOBULIN UR ELPH-MCNC: 2.9 GM/DL
GLUCOSE SERPL-MCNC: 89 MG/DL (ref 65–99)
POTASSIUM SERPL-SCNC: 3.7 MMOL/L (ref 3.5–5.2)
PROT SERPL-MCNC: 7.3 G/DL (ref 6–8.5)
SODIUM SERPL-SCNC: 140 MMOL/L (ref 136–145)

## 2020-08-27 PROCEDURE — 80053 COMPREHEN METABOLIC PANEL: CPT | Performed by: ORTHOPAEDIC SURGERY

## 2020-08-27 PROCEDURE — 82248 BILIRUBIN DIRECT: CPT | Performed by: ORTHOPAEDIC SURGERY

## 2020-08-27 PROCEDURE — 36415 COLL VENOUS BLD VENIPUNCTURE: CPT
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
